# Patient Record
Sex: MALE | Race: BLACK OR AFRICAN AMERICAN | Employment: OTHER | URBAN - METROPOLITAN AREA
[De-identification: names, ages, dates, MRNs, and addresses within clinical notes are randomized per-mention and may not be internally consistent; named-entity substitution may affect disease eponyms.]

---

## 2017-01-20 ENCOUNTER — ANESTHESIA EVENT (OUTPATIENT)
Dept: ENDOSCOPY | Age: 57
End: 2017-01-20
Payer: MEDICARE

## 2017-01-23 ENCOUNTER — SURGERY (OUTPATIENT)
Age: 57
End: 2017-01-23

## 2017-01-23 ENCOUNTER — ANESTHESIA (OUTPATIENT)
Dept: ENDOSCOPY | Age: 57
End: 2017-01-23
Payer: MEDICARE

## 2017-01-23 ENCOUNTER — HOSPITAL ENCOUNTER (OUTPATIENT)
Age: 57
Setting detail: OUTPATIENT SURGERY
Discharge: HOME | End: 2017-01-23
Attending: INTERNAL MEDICINE | Admitting: INTERNAL MEDICINE
Payer: MEDICARE

## 2017-01-23 VITALS
HEART RATE: 60 BPM | TEMPERATURE: 96.8 F | BODY MASS INDEX: 28.88 KG/M2 | HEIGHT: 69 IN | RESPIRATION RATE: 16 BRPM | OXYGEN SATURATION: 99 % | SYSTOLIC BLOOD PRESSURE: 155 MMHG | WEIGHT: 195 LBS | DIASTOLIC BLOOD PRESSURE: 87 MMHG

## 2017-01-23 LAB
ATRIAL RATE: 71 BPM
BUN BLD-MCNC: 6 MG/DL (ref 7–18)
CALCULATED P AXIS, ECG09: 47 DEGREES
CALCULATED R AXIS, ECG10: 18 DEGREES
CALCULATED T AXIS, ECG11: -38 DEGREES
CHLORIDE BLD-SCNC: 96 MMOL/L (ref 100–108)
DIAGNOSIS, 93000: NORMAL
GLUCOSE BLD STRIP.AUTO-MCNC: 130 MG/DL (ref 70–110)
GLUCOSE BLD STRIP.AUTO-MCNC: 192 MG/DL (ref 74–106)
HCT VFR BLD CALC: 47 % (ref 36–49)
HGB BLD-MCNC: 16 G/DL (ref 12–16)
P-R INTERVAL, ECG05: 132 MS
POTASSIUM BLD-SCNC: 3.3 MMOL/L (ref 3.5–5.5)
Q-T INTERVAL, ECG07: 402 MS
QRS DURATION, ECG06: 84 MS
QTC CALCULATION (BEZET), ECG08: 436 MS
SODIUM BLD-SCNC: 139 MMOL/L (ref 136–145)
VENTRICULAR RATE, ECG03: 71 BPM

## 2017-01-23 PROCEDURE — 76040000019: Performed by: INTERNAL MEDICINE

## 2017-01-23 PROCEDURE — A9270 NON-COVERED ITEM OR SERVICE: HCPCS | Performed by: NURSE ANESTHETIST, CERTIFIED REGISTERED

## 2017-01-23 PROCEDURE — 82962 GLUCOSE BLOOD TEST: CPT

## 2017-01-23 PROCEDURE — 74011636637 HC RX REV CODE- 636/637: Performed by: NURSE ANESTHETIST, CERTIFIED REGISTERED

## 2017-01-23 PROCEDURE — 76060000031 HC ANESTHESIA FIRST 0.5 HR: Performed by: INTERNAL MEDICINE

## 2017-01-23 PROCEDURE — 74011250636 HC RX REV CODE- 250/636: Performed by: NURSE ANESTHETIST, CERTIFIED REGISTERED

## 2017-01-23 PROCEDURE — 82947 ASSAY GLUCOSE BLOOD QUANT: CPT

## 2017-01-23 PROCEDURE — 77030009426 HC FCPS BIOP ENDOSC BSC -B: Performed by: INTERNAL MEDICINE

## 2017-01-23 PROCEDURE — 93005 ELECTROCARDIOGRAM TRACING: CPT

## 2017-01-23 PROCEDURE — 77030031670 HC DEV INFL ENDOTEK BIG60 MRTM -B: Performed by: INTERNAL MEDICINE

## 2017-01-23 PROCEDURE — 74011250636 HC RX REV CODE- 250/636

## 2017-01-23 PROCEDURE — 88305 TISSUE EXAM BY PATHOLOGIST: CPT | Performed by: INTERNAL MEDICINE

## 2017-01-23 RX ORDER — PROPOFOL 10 MG/ML
INJECTION, EMULSION INTRAVENOUS AS NEEDED
Status: DISCONTINUED | OUTPATIENT
Start: 2017-01-23 | End: 2017-01-23 | Stop reason: HOSPADM

## 2017-01-23 RX ORDER — SODIUM CHLORIDE 0.9 % (FLUSH) 0.9 %
5-10 SYRINGE (ML) INJECTION EVERY 8 HOURS
Status: CANCELLED | OUTPATIENT
Start: 2017-01-23 | End: 2017-01-23

## 2017-01-23 RX ORDER — DEXTROMETHORPHAN/PSEUDOEPHED 2.5-7.5/.8
1.2 DROPS ORAL
Status: CANCELLED | OUTPATIENT
Start: 2017-01-23

## 2017-01-23 RX ORDER — SODIUM CHLORIDE, SODIUM LACTATE, POTASSIUM CHLORIDE, CALCIUM CHLORIDE 600; 310; 30; 20 MG/100ML; MG/100ML; MG/100ML; MG/100ML
75 INJECTION, SOLUTION INTRAVENOUS CONTINUOUS
Status: DISCONTINUED | OUTPATIENT
Start: 2017-01-24 | End: 2017-01-23 | Stop reason: HOSPADM

## 2017-01-23 RX ORDER — SODIUM CHLORIDE 0.9 % (FLUSH) 0.9 %
5-10 SYRINGE (ML) INJECTION AS NEEDED
Status: CANCELLED | OUTPATIENT
Start: 2017-01-23 | End: 2017-01-23

## 2017-01-23 RX ORDER — INSULIN LISPRO 100 [IU]/ML
INJECTION, SOLUTION INTRAVENOUS; SUBCUTANEOUS
Status: COMPLETED | OUTPATIENT
Start: 2017-01-23 | End: 2017-01-23

## 2017-01-23 RX ADMIN — INSULIN LISPRO 2 UNITS: 100 INJECTION, SOLUTION INTRAVENOUS; SUBCUTANEOUS at 08:56

## 2017-01-23 RX ADMIN — SODIUM CHLORIDE, SODIUM LACTATE, POTASSIUM CHLORIDE, AND CALCIUM CHLORIDE 75 ML/HR: 600; 310; 30; 20 INJECTION, SOLUTION INTRAVENOUS at 08:49

## 2017-01-23 RX ADMIN — PROPOFOL 50 MG: 10 INJECTION, EMULSION INTRAVENOUS at 09:44

## 2017-01-23 RX ADMIN — PROPOFOL 50 MG: 10 INJECTION, EMULSION INTRAVENOUS at 09:40

## 2017-01-23 NOTE — H&P
Date of Surgery Update:  Justina Vides was seen and examined. History and physical has been reviewed. The patient has been examined.  There have been no significant clinical changes since the completion of the originally dated History and Physical.    Signed By: Eulalia Cam MD     January 23, 2017 9:32 AM

## 2017-01-23 NOTE — DISCHARGE INSTRUCTIONS
Patient Discharge Instructions    Drake Fish / [id number] : 1960    Admitted 2017 Discharged: 2017         Procedure Impression:  1. Single 3mm ascending colon polyp removed with biopsy forceps. 2. Moderate internal hemorrhoids. 3. Otherwise normal colonoscopy including terminal ileum. Random biopsies taken for microscopic colitis evaluation. Recommendation:  1. Resume regular diet, recommend high fiber  2. Will contact with polyp results in 2 weeks. These results will determine timing to next colon cancer screening. 3. Please contact our office if you have not received the results by three weeks. Recommended Diet: Regular Diet    Recommended Activity:    1. Do not drink alcohol, drive or operate machinery for 12 hours   2. Call if any fever, abdominal pain or bleeding noted. Signed By: Sydnie Sauceda MD     2017            Colonoscopy: What to Expect at [address] [place] [address]  After you have a colonoscopy, you will stay at the clinic for 1 to 2 hours until the medicines wear off. Then you can go home. But you will need to arrange for a ride. Your doctor will tell you when you can eat and do your other usual activities. Your doctor will talk to you about when you will need your next colonoscopy. Your doctor can help you decide how often you need to be checked. This will depend on the results of your test and your risk for colorectal cancer. After the test, you may be bloated or have gas pains. You may need to pass gas. If a biopsy was done or a polyp was removed, you may have streaks of blood in your stool (feces) for a few days. This care sheet gives you a general idea about how long it will take for you to recover. But each person recovers at a different pace. Follow the steps below to get better as quickly as possible. How can you care for yourself at home? Activity  · Rest when you feel tired.   · You can do your normal activities when it feels okay to do so. Diet  · Follow your doctor's directions for eating. · Unless your doctor has told you not to, drink plenty of fluids. This helps to replace the fluids that were lost during the colon prep. · Do not drink alcohol. Medicines  · Your doctor will tell you if and when you can restart your medicines. He or she will also give you instructions about taking any new medicines. · If you take blood thinners, such as warfarin (Coumadin), clopidogrel (Plavix), or aspirin, be sure to talk to your doctor. He or she will tell you if and when to start taking those medicines again. Make sure that you understand exactly what your doctor wants you to do. · If polyps were removed or a biopsy was done during the test, your doctor may tell you not to take aspirin or other anti-inflammatory medicines for a few days. These include ibuprofen (Advil, Motrin) and naproxen (Aleve). Other instructions  · For your safety, do not drive or operate machinery until the medicine wears off and you can think clearly. Your doctor may tell you not to drive or operate machinery until the day after your test.  · Do not sign legal documents or make major decisions until the medicine wears off and you can think clearly. The anesthesia can make it hard for you to fully understand what you are agreeing to. Follow-up care is a key part of your treatment and safety. Be sure to make and go to all appointments, and call your doctor if you are having problems. It's also a good idea to know your test results and keep a list of the medicines you take. When should you call for help? Call 911 anytime you think you may need emergency care. For example, call if:  · You passed out (lost consciousness). · You pass maroon or bloody stools. · You have severe belly pain. Call your doctor now or seek immediate medical care if:  · Your stools are black and tarlike.   · Your stools have streaks of blood, but you did not have a biopsy or any polyps removed. · You have belly pain, or your belly is swollen and firm. · You vomit. · You have a fever. · You are very dizzy. Watch closely for changes in your health, and be sure to contact your doctor if you have any problems. Where can you learn more? Go to http://laurita-venu.info/. Enter E264 in the search box to learn more about \"Colonoscopy: What to Expect at Home. \"  Current as of: August 9, 2016  Content Version: 11.1  © 6352-8455 [employer]. Care instructions adapted under license by [employer] (which disclaims liability or warranty for this information). If you have questions about a medical condition or this instruction, always ask your healthcare professional. [address] any warranty or liability for your use of this information. DISCHARGE SUMMARY from Nurse    The following personal items are in your possession at time of discharge:    Dental Appliances: None  Visual Aid: Glasses                            PATIENT INSTRUCTIONS:    After general anesthesia or intravenous sedation, for 24 hours or while taking prescription Narcotics:  · Limit your activities  · Do not drive and operate hazardous machinery  · Do not make important personal or business decisions  · Do  not drink alcoholic beverages  · If you have not urinated within 8 hours after discharge, please contact your surgeon on call.     Report the following to your surgeon:  · Excessive pain, swelling, redness or odor of or around the surgical area  · Temperature over 100.5  · Nausea and vomiting lasting longer than 4 hours or if unable to take medications  · Any signs of decreased circulation or nerve impairment to extremity: change in color, persistent  numbness, tingling, coldness or increase pain  · Any questions        What to do at Home:  These are general instructions for a healthy lifestyle:    No smoking/ No tobacco products/ Avoid exposure to second hand smoke    Surgeon General's Warning:  Quitting smoking now greatly reduces serious risk to your health. Obesity, smoking, and sedentary lifestyle greatly increases your risk for illness    A healthy diet, regular physical exercise & weight monitoring are important for maintaining a healthy lifestyle    You may be retaining fluid if you have a history of heart failure or if you experience any of the following symptoms:  Weight gain of 3 pounds or more overnight or 5 pounds in a week, increased swelling in our hands or feet or shortness of breath while lying flat in bed. Please call your doctor as soon as you notice any of these symptoms; do not wait until your next office visit. Recognize signs and symptoms of STROKE:    F-face looks uneven    A-arms unable to move or move unevenly    S-speech slurred or non-existent    T-time-call 911 as soon as signs and symptoms begin-DO NOT go       Back to bed or wait to see if you get better-TIME IS BRAIN. Warning Signs of HEART ATTACK     Call 911 if you have these symptoms:   Chest discomfort. Most heart attacks involve discomfort in the center of the chest that lasts more than a few minutes, or that goes away and comes back. It can feel like uncomfortable pressure, squeezing, fullness, or pain.  Discomfort in other areas of the upper body. Symptoms can include pain or discomfort in one or both arms, the back, neck, jaw, or stomach.  Shortness of breath with or without chest discomfort.  Other signs may include breaking out in a cold sweat, nausea, or lightheadedness. Don't wait more than five minutes to call 911 - MINUTES MATTER! Fast action can save your life. Calling 911 is almost always the fastest way to get lifesaving treatment. Emergency Medical Services staff can begin treatment when they arrive -- up to an hour sooner than if someone gets to the hospital by car. The discharge information has been reviewed with the patient.   The patient verbalized understanding. Discharge medications reviewed with the patient and appropriate educational materials and side effects teaching were provided. Patient armband removed and given to patient to take home.   Patient was informed of the privacy risks if armband lost or stolen

## 2017-01-23 NOTE — IP AVS SNAPSHOT
Meghann Locke 
 
 
 [address] 
[phone] Patient: Nafsia Parra MRN: [id number] :1960 You are allergic to the following Allergen Reactions Lisinopril Swelling Recent Documentation Height Weight BMI Smoking Status 1.753 m 88.5 kg 28.8 kg/m2 Current Every Day Smoker Emergency Contacts Name Discharge Info Relation Home Work [employer] CAREGIVER [3] Spouse [3] [phone] About your hospitalization You were admitted on:  2017 You last received care in the:  [facility] PHASE 2 RECOVERY You were discharged on:  2017 Unit phone number:  [phone] Why you were hospitalized Your primary diagnosis was:  Not on File Providers Seen During Your Hospitalizations Provider Role Specialty Primary office phone Yaquelin Eid MD Attending Provider Gastroenterology [phone] Your Primary Care Physician (PCP) Primary Care Physician Office Phone Office Fax [facility] [phone] Follow-up Information Follow up With Details Comments Contact Info Shadi Bagley MD   [address] [facility] [address] [place] [phone] 
[phone] Your Appointments 2017 10:00 AM EST Office Visit with Shadi Bagley MD  
[facility] [address] [facility] [address] [facility] [phone] Current Discharge Medication List  
  
CONTINUE these medications which have NOT CHANGED Dose & Instructions Dispensing Information Comments Morning Noon Evening Bedtime  
 albuterol 90 mcg/actuation inhaler Commonly known as:  PROVENTIL HFA, VENTOLIN HFA, PROAIR HFA Your next dose is: Today, Tomorrow Other:  _________ Dose:  2 Puff Take 2 Puffs by inhalation every six (6) hours as needed for Wheezing. Quantity:  1 Inhaler Refills:  3  
     
   
   
   
  
 amLODIPine 10 mg tablet Commonly known as:  Arlyss Redford Your next dose is: Today, Tomorrow Other:  _________ Dose:  10 mg Take 10 mg by mouth daily. Refills:  0  
     
   
   
   
  
 aspirin 81 mg chewable tablet Your next dose is: Today, Tomorrow Other:  _________ Dose:  81 mg Take 1 Tab by mouth daily. Quantity:  90 Tab Refills:  1  
     
   
   
   
  
 carboxymethylcellulose sodium 0.5 % Drop ophthalmic solution Commonly known as:  REFRESH TEARS Your next dose is: Today, Tomorrow Other:  _________ Dose:  1 Drop Administer 1 Drop to both eyes two (2) times a day. Quantity:  30 mL Refills:  3  
     
   
   
   
  
 cetirizine 10 mg tablet Commonly known as:  ZYRTEC Your next dose is: Today, Tomorrow Other:  _________ Dose:  10 mg Take 1 Tab by mouth daily. Quantity:  30 Tab Refills:  3 cholecalciferol 1,000 unit tablet Commonly known as:  VITAMIN D3 Your next dose is: Today, Tomorrow Other:  _________ Dose:  1000 Units Take 1,000 Units by mouth daily. Refills:  0  
     
   
   
   
  
 clobetasol 0.05 % ointment Commonly known as:  Suzanne Plume Your next dose is: Today, Tomorrow Other:  _________ Apply  to affected area two (2) times a day. Quantity:  15 g Refills:  2  
     
   
   
   
  
 fluticasone 50 mcg/actuation nasal spray Commonly known as:  Bronson Riser Your next dose is: Today, Tomorrow Other:  _________ Dose:  2 Spray 2 Sprays by Both Nostrils route daily. Quantity:  3 Bottle Refills:  4  
     
   
   
   
  
 fluticasone-salmeterol 250-50 mcg/dose diskus inhaler Commonly known as:  ADVAIR Your next dose is: Today, Tomorrow Other:  _________ Dose:  1 Puff Take 1 Puff by inhalation every twelve (12) hours. Quantity:  2 Inhaler Refills:  3  
     
   
   
   
  
 gabapentin 300 mg capsule Commonly known as:  NEURONTIN Your next dose is: Today, Tomorrow Other:  _________ Dose:  300 mg Take 1 Cap by mouth four (4) times daily. Quantity:  120 Cap Refills:  1  
     
   
   
   
  
 glipiZIDE 10 mg tablet Commonly known as:  Terrie Dc Your next dose is: Today, Tomorrow Other:  _________ Dose:  10 mg Take 10 mg by mouth three (3) times daily. Refills:  0  
     
   
   
   
  
 hydroCHLOROthiazide 25 mg tablet Commonly known as:  HYDRODIURIL Your next dose is: Today, Tomorrow Other:  _________ Dose:  25 mg Take 25 mg by mouth daily. Refills:  0  
     
   
   
   
  
 lactulose 10 gram packet Commonly known as:  Raymond Muller Your next dose is: Today, Tomorrow Other:  _________ Dose:  10 g Take 10 g by mouth three (3) times daily. Refills:  0  
     
   
   
   
  
 losartan 50 mg tablet Commonly known as:  COZAAR Your next dose is: Today, Tomorrow Other:  _________ Take  by mouth daily. Refills:  0  
     
   
   
   
  
 metFORMIN 1,000 mg tablet Commonly known as:  GLUCOPHAGE Your next dose is: Today, Tomorrow Other:  _________ Dose:  1000 mg Take 1,000 mg by mouth two (2) times daily (with meals). Refills:  0  
     
   
   
   
  
 omega-3 fatty acids-vitamin e 1,000 mg Cap Commonly known as:  FISH OIL Your next dose is: Today, Tomorrow Other:  _________ Dose:  1 Cap Take 1 Cap by mouth two (2) times a day. Quantity:  60 Cap Refills:  3  
     
   
   
   
  
 polyethylene glycol 17 gram/dose powder Commonly known as:  Beola Frank Your next dose is: Today, Tomorrow Other:  _________ Dose:  17 g Take 17 g by mouth daily. Quantity:  850 g Refills:  3  
     
   
   
   
  
 potassium chloride 10 mEq tablet Commonly known as:  K-DUR, KLOR-CON Your next dose is: Today, Tomorrow Other:  _________ Dose:  10 mEq Take 1 Tab by mouth daily. Quantity:  30 Tab Refills:  0  
     
   
   
   
  
 pravastatin 40 mg tablet Commonly known as:  PRAVACHOL Your next dose is: Today, Tomorrow Other:  _________ Dose:  60 mg Take 1.5 Tabs by mouth nightly. Take one-half tablet by mouth at bedtime Quantity:  90 Tab Refills:  1  
     
   
   
   
  
 propranolol  mg SR capsule Commonly known as:  INDERAL LA Your next dose is: Today, Tomorrow Other:  _________ Dose:  120 mg Take 120 mg by mouth daily. Refills:  0 PROTONIX 40 mg granules for oral suspension Generic drug:  pantoprazole Your next dose is: Today, Tomorrow Other:  _________ Dose:  40 mg  
40 mg daily. Refills:  0  
     
   
   
   
  
 traMADol 50 mg tablet Commonly known as:  ULTRAM  
   
Your next dose is: Today, Tomorrow Other:  _________ Dose:  50 mg Take 1 Tab by mouth every eight (8) hours as needed for Pain. Max Daily Amount: 150 mg.  
 Quantity:  40 Tab Refills:  0  
     
   
   
   
  
 valsartan 80 mg tablet Commonly known as:  DIOVAN Your next dose is: , Tomorrow Other:  _________ Dose:  80 mg Take 1 Tab by mouth daily. Quantity:  30 Tab Refills:  3 Discharge Instructions Patient Discharge Instructions Nenita Frias / [id number] : 1960 Admitted 2017 Discharged: 2017 Procedure Impression: 1. Single 3mm ascending colon polyp removed with biopsy forceps. 2. Moderate internal hemorrhoids. 3. Otherwise normal colonoscopy including terminal ileum. Random biopsies taken for microscopic colitis evaluation. Recommendation: 1. Resume regular diet, recommend high fiber 2. Will contact with polyp results in 2 weeks. These results will determine timing to next colon cancer screening. 3. Please contact our office if you have not received the results by three weeks. Recommended Diet: Regular Diet Recommended Activity: 1. Do not drink alcohol, drive or operate machinery for 12 hours 2. Call if any fever, abdominal pain or bleeding noted. Signed By: Gary Gutierrez MD   
 January 23, 2017 Colonoscopy: What to Expect at [facility] Your Recovery After you have a colonoscopy, you will stay at the clinic for 1 to 2 hours until the medicines wear off. Then you can go home. But you will need to arrange for a ride. Your doctor will tell you when you can eat and do your other usual activities. Your doctor will talk to you about when you will need your next colonoscopy. Your doctor can help you decide how often you need to be checked. This will depend on the results of your test and your risk for colorectal cancer. After the test, you may be bloated or have gas pains. You may need to pass gas. If a biopsy was done or a polyp was removed, you may have streaks of blood in your stool (feces) for a few days. This care sheet gives you a general idea about how long it will take for you to recover. But each person recovers at a different pace. Follow the steps below to get better as quickly as possible. How can you care for yourself at home? Activity · Rest when you feel tired. · You can do your normal activities when it feels okay to do so. Diet · Follow your doctor's directions for eating. · Unless your doctor has told you not to, drink plenty of fluids. This helps to replace the fluids that were lost during the colon prep. · Do not drink alcohol. Medicines · Your doctor will tell you if and when you can restart your medicines. He or she will also give you instructions about taking any new medicines. · If you take blood thinners, such as warfarin (Coumadin), clopidogrel (Plavix), or aspirin, be sure to talk to your doctor. He or she will tell you if and when to start taking those medicines again. Make sure that you understand exactly what your doctor wants you to do. · If polyps were removed or a biopsy was done during the test, your doctor may tell you not to take aspirin or other anti-inflammatory medicines for a few days. These include ibuprofen (Advil, Motrin) and naproxen (Aleve). Other instructions · For your safety, do not drive or operate machinery until the medicine wears off and you can think clearly. Your doctor may tell you not to drive or operate machinery until the day after your test. 
· Do not sign legal documents or make major decisions until the medicine wears off and you can think clearly. The anesthesia can make it hard for you to fully understand what you are agreeing to. Follow-up care is a key part of your treatment and safety. Be sure to make and go to all appointments, and call your doctor if you are having problems. It's also a good idea to know your test results and keep a list of the medicines you take. When should you call for help? Call 911 anytime you think you may need emergency care. For example, call if: 
· You passed out (lost consciousness). · You pass maroon or bloody stools. · You have severe belly pain. Call your doctor now or seek immediate medical care if: 
· Your stools are black and tarlike. · Your stools have streaks of blood, but you did not have a biopsy or any polyps removed. · You have belly pain, or your belly is swollen and firm. · You vomit. · You have a fever. · You are very dizzy. Watch closely for changes in your health, and be sure to contact your doctor if you have any problems. Where can you learn more? Go to http://laurita-venu.info/. Enter E264 in the search box to learn more about \"Colonoscopy: What to Expect at Home. \" Current as of: August 9, 2016 Content Version: 11.1 © 2765-2014 [employer]. Care instructions adapted under license by [employer] (which disclaims liability or warranty for this information). If you have questions about a medical condition or this instruction, always ask your healthcare professional. [address] any warranty or liability for your use of this information. DISCHARGE SUMMARY from Nurse The following personal items are in your possession at time of discharge: 
 
Dental Appliances: None Visual Aid: Glasses PATIENT INSTRUCTIONS: 
 
After general anesthesia or intravenous sedation, for 24 hours or while taking prescription Narcotics: · Limit your activities · Do not drive and operate hazardous machinery · Do not make important personal or business decisions · Do  not drink alcoholic beverages · If you have not urinated within 8 hours after discharge, please contact your surgeon on call. Report the following to your surgeon: 
· Excessive pain, swelling, redness or odor of or around the surgical area · Temperature over 100.5 · Nausea and vomiting lasting longer than 4 hours or if unable to take medications · Any signs of decreased circulation or nerve impairment to extremity: change in color, persistent  numbness, tingling, coldness or increase pain · Any questions What to do at Home: These are general instructions for a healthy lifestyle: No smoking/ No tobacco products/ Avoid exposure to second hand smoke Surgeon General's Warning:  Quitting smoking now greatly reduces serious risk to your health. Obesity, smoking, and sedentary lifestyle greatly increases your risk for illness A healthy diet, regular physical exercise & weight monitoring are important for maintaining a healthy lifestyle You may be retaining fluid if you have a history of heart failure or if you experience any of the following symptoms:  Weight gain of 3 pounds or more overnight or 5 pounds in a week, increased swelling in our hands or feet or shortness of breath while lying flat in bed. Please call your doctor as soon as you notice any of these symptoms; do not wait until your next office visit. Recognize signs and symptoms of STROKE: 
 
F-face looks uneven A-arms unable to move or move unevenly S-speech slurred or non-existent T-time-call 911 as soon as signs and symptoms begin-DO NOT go Back to bed or wait to see if you get better-TIME IS BRAIN. Warning Signs of HEART ATTACK Call 911 if you have these symptoms: 
? Chest discomfort. Most heart attacks involve discomfort in the center of the chest that lasts more than a few minutes, or that goes away and comes back. It can feel like uncomfortable pressure, squeezing, fullness, or pain. ? Discomfort in other areas of the upper body. Symptoms can include pain or discomfort in one or both arms, the back, neck, jaw, or stomach. ? Shortness of breath with or without chest discomfort. ? Other signs may include breaking out in a cold sweat, nausea, or lightheadedness. Don't wait more than five minutes to call [address]! Fast action can save your life. Calling 911 is almost always the fastest way to get lifesaving treatment. Emergency Medical Services staff can begin treatment when they arrive  up to an hour sooner than if someone gets to the hospital by car. The discharge information has been reviewed with the patient. The patient verbalized understanding. Discharge medications reviewed with the patient and appropriate educational materials and side effects teaching were provided. Patient armband removed and given to patient to take home. Patient was informed of the privacy risks if armband lost or stolen Discharge Orders None Introducing [facility]! Dear Jing Forth: Thank you for requesting a [employer] account. Our records indicate that you already have an active [employer] account. You can access your account anytime at [email or web address] Did you know that you can access your hospital and ER discharge instructions at any time in [employer]? You can also review all of your test results from your hospital stay or ER visit. Additional Information If you have questions, please visit the Frequently Asked Questions section of the [employer] website at [email or web address] Remember, [employer] is NOT to be used for urgent needs. For medical emergencies, dial 911. Now available from your iPhone and Android! General Information Please provide this summary of care documentation to your next provider. Patient Signature:  ____________________________________________________________ Date:  ____________________________________________________________  
  
Jannette Blanca Provider Signature:  ____________________________________________________________ Date:  ____________________________________________________________

## 2017-01-23 NOTE — PROCEDURES
Colonoscopy Report    Patient: Claire Fountain MRN: [id number]  SSN: [id number]    YOB: 1960  Age: 49 y.o. Sex: male      Date of Procedure: 1/23/2017    IMPRESSION:  1. Single 3mm ascending colon polyp removed with biopsy forceps. 2. Moderate internal hemorrhoids. 3. Otherwise normal colonoscopy including terminal ileum. Random biopsies taken for microscopic colitis evaluation. RECOMMENDATIONS:  1. Resume regular diet, Recommend high fiber. 2. Will contact with polyp results in 2 weeks. These results will determine timing to next screening. Patient will be instructed to contact our office if they have not received the results by three weeks. Indication:  Change in bowel habits  Procedure Performed: Colonoscopy biopsy, polypectomy (cold biopsy)  Endoscopist: Heidi Howell MD  Assistant: Endoscopy Technician-1: Amy Heath  Pre Op Nurse: Michael Orourke RN  Endoscopy RN-1: Haley De. Kiah Gardiner RN  ASA: ASA 3 - Patient with moderate systemic disease with functional limitations  Mallampati Score: III (soft palate, base of uvula visible)  Anesthesia: MAC anesthesia  Endoscope:     [x]  CF H 190AL   []  PCF H190AL   []  GIF H 190    Extent of Examination:terminal ileum  Quality of Preparation:     []  Excellent   [x]  Very Good   [] Fair but adequate   [] Fair, inadequate   []  Poor      Technique: The procedure was discussed with the patient including risks, benefits, alternatives including risks of IV sedation, bleeding, perforation and missed polyp. A safety timeout was performed. The patient was given incremental doses of intravenous sedation to achieve moderate sedation. The patients vital signs were monitored at all times including heart rate, rhythm, blood pressure and oxygen saturation. The patient was placed in left lateral position. When adequate sedation was achieved a perianal inspection and a digital rectal exam were performed.  Video colonoscope was introduced into the rectum and advanced under direct vision up to the terminal ileum. The cecum was identified by IC valve, appendiceal orifice and crows foot. With adequate insufflation and maneuvering of the withdrawing scope, the colonic mucosa was visualized carefully. Retroflexion was performed in the rectum and the distal rectum visualized. The patient tolerated the procedure very well and was transferred to recovery area. Findings:  1. Normal rectal exam.   2. Normal terminal ileum with no ulceration, mass, stricture. 3. There was a single 3mm sessile polyp in the ascending colon. The polyp was removed with biopsy forceps. 4. There were moderate sized hemorrhoids in the distal rectum. 5. The colonic mucosa was otherwise normal with no other polyps and no ulceration, mass, stricture. Four pinch biopsies taken in the descending colon to evaluate for microscopic colitis.        EBL:None  Specimen:   ID Type Source Tests Collected by Time Destination   1 : bx polyp Preservative Colon, Ascending  David Mendoza MD 1/23/2017 7024 Pathology   2 : bxs r/o microscopic colitis Preservative Colon, Descending  David Mendoza MD 1/23/2017 2944 Pathology       David Mendoza MD  January 23, 2017  9:58 AM

## 2017-01-23 NOTE — ANESTHESIA PREPROCEDURE EVALUATION
Anesthetic History   No history of anesthetic complications            Review of Systems / Medical History  Patient summary reviewed and pertinent labs reviewed    Pulmonary    COPD    Sleep apnea: No treatment           Neuro/Psych   Within defined limits           Cardiovascular    Hypertension              Exercise tolerance: >4 METS     GI/Hepatic/Renal  Within defined limits              Endo/Other    Diabetes: poorly controlled, type 2         Other Findings   Comments: Current Smoker? YES       Elective Surgery? Yes       Abstained from smoking 24 hours prior to anesthesia? NO    Risk Factors for Postoperative nausea/vomiting:       History of postoperative nausea/vomiting? NO       Female? NO       Motion sickness? NO       Intended opioid administration for postoperative analgesia?   NO              Anesthetic Plan    ASA: 3  Anesthesia type: MAC          Induction: Intravenous  Anesthetic plan and risks discussed with: Patient

## 2017-01-24 NOTE — ANESTHESIA POSTPROCEDURE EVALUATION
Post-Anesthesia Evaluation and Assessment    Patient: Erin Mae MRN: [id number]  SSN: [id number]    YOB: 1960  Age: 49 y.o. Sex: male       Cardiovascular Function/Vital Signs  Visit Vitals    /87 (BP 1 Location: Left arm, BP Patient Position: At rest)    Pulse 60    Temp 36 °C (96.8 °F)    Resp 16    Ht 5' 9\" (1.753 m)    Wt 88.5 kg (195 lb)    SpO2 99%    BMI 28.8 kg/m2       Patient is status post MAC anesthesia for Procedure(s):  COLONOSCOPY. Nausea/Vomiting: None    Postoperative hydration reviewed and adequate. Pain:  Pain Scale 1: Numeric (0 - 10) (01/23/17 1043)  Pain Intensity 1: 0 (01/23/17 1043)   Managed    Neurological Status: At baseline    Mental Status and Level of Consciousness: Alert and oriented     Pulmonary Status:   O2 Device: Room air (01/23/17 0958)   Adequate oxygenation and airway patent    Complications related to anesthesia: None    Post-anesthesia assessment completed.  No concerns    Signed By: Fili Mesa MD     January 24, 2017

## 2017-02-03 ENCOUNTER — OFFICE VISIT (OUTPATIENT)
Dept: FAMILY MEDICINE CLINIC | Age: 57
End: 2017-02-03

## 2017-02-03 VITALS
DIASTOLIC BLOOD PRESSURE: 87 MMHG | OXYGEN SATURATION: 95 % | SYSTOLIC BLOOD PRESSURE: 149 MMHG | WEIGHT: 197 LBS | TEMPERATURE: 96.8 F | RESPIRATION RATE: 17 BRPM | HEART RATE: 94 BPM | HEIGHT: 69 IN | BODY MASS INDEX: 29.18 KG/M2

## 2017-02-03 DIAGNOSIS — E87.6 HYPOKALEMIA: ICD-10-CM

## 2017-02-03 DIAGNOSIS — J30.2 OTHER SEASONAL ALLERGIC RHINITIS: ICD-10-CM

## 2017-02-03 DIAGNOSIS — E78.00 HYPERCHOLESTEREMIA: ICD-10-CM

## 2017-02-03 DIAGNOSIS — M54.41 MIDLINE LOW BACK PAIN WITH RIGHT-SIDED SCIATICA, UNSPECIFIED CHRONICITY: ICD-10-CM

## 2017-02-03 DIAGNOSIS — I10 ESSENTIAL HYPERTENSION: Primary | ICD-10-CM

## 2017-02-03 DIAGNOSIS — M79.672 PAIN IN BOTH FEET: ICD-10-CM

## 2017-02-03 DIAGNOSIS — M79.671 PAIN IN BOTH FEET: ICD-10-CM

## 2017-02-03 RX ORDER — TRAMADOL HYDROCHLORIDE 50 MG/1
50 TABLET ORAL
Qty: 40 TAB | Refills: 0 | Status: SHIPPED | OUTPATIENT
Start: 2017-02-03 | End: 2017-03-16 | Stop reason: SDUPTHER

## 2017-02-03 RX ORDER — POTASSIUM CHLORIDE 750 MG/1
10 TABLET, EXTENDED RELEASE ORAL DAILY
Qty: 30 TAB | Refills: 0 | Status: SHIPPED | OUTPATIENT
Start: 2017-02-03 | End: 2017-04-19

## 2017-02-03 RX ORDER — CETIRIZINE HCL 10 MG
10 TABLET ORAL DAILY
Qty: 30 TAB | Refills: 3 | Status: SHIPPED | OUTPATIENT
Start: 2017-02-03 | End: 2018-02-08 | Stop reason: SDUPTHER

## 2017-02-03 NOTE — MR AVS SNAPSHOT
Visit Information Date & Time Provider Department Dept. Phone Encounter #  
 2/3/2017 10:00 AM [id number], [address] [facility] [phone] Follow-up Instructions Return in about 6 weeks (around 3/17/2017). Upcoming Health Maintenance Date Due  
 EYE EXAM RETINAL OR DILATED Q1 3/8/1972 FOOT EXAM Q1 9/17/2016 MICROALBUMIN Q1 2/17/2017 HEMOGLOBIN A1C Q6M 5/21/2017 LIPID PANEL Q1 7/27/2017 COLONOSCOPY 1/23/2022 DTaP/Tdap/Td series (2 - Td) 9/17/2025 Allergies as of 2/3/2017  Review Complete On: 2/3/2017 By: [id number] MD  
  
 Severity Noted Reaction Type Reactions Lisinopril  03/01/2016    Swelling Current Immunizations  Never Reviewed Name Date Influenza Vaccine 9/17/2015  9:38 AM  
 Influenza Vaccine (Quad) PF 10/28/2016 Pneumococcal Polysaccharide (PPSV-23) 7/27/2016 Tdap 9/17/2015  9:39 AM  
  
 Not reviewed this visit You Were Diagnosed With   
  
 Codes Comments Essential hypertension    -  Primary ICD-10-CM: I10 
ICD-9-CM: 401.9 Midline low back pain with right-sided sciatica, unspecified chronicity     ICD-10-CM: M54.41 
ICD-9-CM: 724.3 Other seasonal allergic rhinitis     ICD-10-CM: J30.2 ICD-9-CM: 477.8 Hypokalemia     ICD-10-CM: E87.6 ICD-9-CM: 276.8 Hypercholesteremia     ICD-10-CM: E78.00 ICD-9-CM: 272.0 Pain in both feet     ICD-10-CM: M79.671, E02.699 ICD-9-CM: 729.5 Vitals BP Pulse Temp Resp Height(growth percentile) Weight(growth percentile) 149/87 (BP 1 Location: Right arm, BP Patient Position: Sitting) 94 96.8 °F (36 °C) (Oral) 17 5' 9\" (1.753 m) 197 lb (89.4 kg) SpO2 BMI Smoking Status 95% 29.09 kg/m2 Current Every Day Smoker Vitals History BMI and BSA Data Body Mass Index Body Surface Area  
 29.09 kg/m 2 2.09 m 2 Preferred Pharmacy Pharmacy Name Phone [address] [place] [address], [facility] [phone] Your Updated Medication List  
  
   
This list is accurate as of: 2/3/17 10:44 AM.  Always use your most recent med list.  
  
  
  
  
 albuterol 90 mcg/actuation inhaler Commonly known as:  PROVENTIL HFA, VENTOLIN HFA, PROAIR HFA Take 2 Puffs by inhalation every six (6) hours as needed for Wheezing. amLODIPine 10 mg tablet Commonly known as:  Jessy Juanjose Take 10 mg by mouth daily. aspirin 81 mg chewable tablet Take 1 Tab by mouth daily. carboxymethylcellulose sodium 0.5 % Drop ophthalmic solution Commonly known as:  REFRESH TEARS Administer 1 Drop to both eyes two (2) times a day. cetirizine 10 mg tablet Commonly known as:  ZYRTEC Take 1 Tab by mouth daily. cholecalciferol 1,000 unit tablet Commonly known as:  VITAMIN D3 Take 1,000 Units by mouth daily. clobetasol 0.05 % ointment Commonly known as:  Harrmoe Reagin Apply  to affected area two (2) times a day. fluticasone 50 mcg/actuation nasal spray Commonly known as:  Caffie Euler 2 Sprays by Both Nostrils route daily. fluticasone-salmeterol 250-50 mcg/dose diskus inhaler Commonly known as:  ADVAIR Take 1 Puff by inhalation every twelve (12) hours. gabapentin 300 mg capsule Commonly known as:  NEURONTIN Take 1 Cap by mouth four (4) times daily. glipiZIDE 10 mg tablet Commonly known as:  Cook Islander Edward Take 10 mg by mouth three (3) times daily. hydroCHLOROthiazide 25 mg tablet Commonly known as:  HYDRODIURIL Take 25 mg by mouth daily. lactulose 10 gram packet Commonly known as:  Veleria Jury Take 10 g by mouth three (3) times daily. losartan 50 mg tablet Commonly known as:  COZAAR Take  by mouth daily. metFORMIN 1,000 mg tablet Commonly known as:  GLUCOPHAGE Take 1,000 mg by mouth two (2) times daily (with meals). omega-3 fatty acids-vitamin e 1,000 mg Cap Commonly known as:  FISH OIL Take 1 Cap by mouth two (2) times a day. polyethylene glycol 17 gram/dose powder Commonly known as:  Lelan Situ Take 17 g by mouth daily. potassium chloride 10 mEq tablet Commonly known as:  K-DUR, KLOR-CON Take 1 Tab by mouth daily. pravastatin 40 mg tablet Commonly known as:  PRAVACHOL Take 1.5 Tabs by mouth nightly. Take one-half tablet by mouth at bedtime  
  
 propranolol  mg SR capsule Commonly known as:  INDERAL LA Take 120 mg by mouth daily. PROTONIX 40 mg granules for oral suspension Generic drug:  pantoprazole 40 mg daily. traMADol 50 mg tablet Commonly known as:  ULTRAM  
Take 1 Tab by mouth every eight (8) hours as needed for Pain. Max Daily Amount: 150 mg.  
  
 valsartan 80 mg tablet Commonly known as:  DIOVAN Take 1 Tab by mouth daily. Prescriptions Printed Refills  
 traMADol (ULTRAM) 50 mg tablet 0 Sig: Take 1 Tab by mouth every eight (8) hours as needed for Pain. Max Daily Amount: 150 mg.  
 Class: Print Route: Oral  
 cetirizine (ZYRTEC) 10 mg tablet 3 Sig: Take 1 Tab by mouth daily. Class: Print Route: Oral  
 potassium chloride (K-DUR, KLOR-CON) 10 mEq tablet 0 Sig: Take 1 Tab by mouth daily. Class: Print Route: Oral  
  
We Performed the Following REFERRAL TO PODIATRY [REF90 Custom] Comments:  
 Please evaluate patient for foot pain Follow-up Instructions Return in about 6 weeks (around 3/17/2017). Referral Information Referral ID Referred By Referred To  
  
 [id number] Rebeca CASTANO Not Available Visits Status Start Date End Date 1 New Request 2/3/17 2/3/18 If your referral has a status of pending review or denied, additional information will be sent to support the outcome of this decision. Introducing [facility]! Dear Gerri Case: Thank you for requesting a [employer] account. Our records indicate that you already have an active [employer] account. You can access your account anytime at [email or web address] Did you know that you can access your hospital and ER discharge instructions at any time in [employer]? You can also review all of your test results from your hospital stay or ER visit. Additional Information If you have questions, please visit the Frequently Asked Questions section of the [employer] website at [email or web address] Remember, [employer] is NOT to be used for urgent needs. For medical emergencies, dial 911. Now available from your iPhone and Android! Please provide this summary of care documentation to your next provider. Your primary care clinician is listed as [id number]. If you have any questions after today's visit, please call [phone].

## 2017-02-03 NOTE — PROGRESS NOTES
Patient presents to clinic for routine follow up. Advance Directive:    1. Do you have an advance directive in place? Patient Reply:     2. If not, would you like material regarding how to put one in place? Patient Reply:      Coordination of Care:    1. Have you been to the ER, urgent care clinic since your last visit? Hospitalized since your last visit? No    2. Have you seen or consulted any other health care providers outside of the [address] [place] [address] since your last visit? Include any pap smears or colon screening. Colonoscopy     Depression Screening completed. Learning Assessment completed. Abuse Screening completed. Health Maintenance reviewed and discussed per provider.

## 2017-02-03 NOTE — PROGRESS NOTES
Nenita Frias is a 49 y.o.  male and presents with     Chief Complaint   Patient presents with    Diabetes    Hypertension    Cholesterol Problem    Foot Pain    Back Pain       Pt had colonopscy that showed tubular adenoma. Pt also was given a med for abdominal pain by GI but neds to be filled by the [place]. Pt complains of pain in left foot. Pt is on Neurontin 4 pills day. Pt complains of bac kpain  Pt informs that blood sugars are good. Pt says he is taking HTN meds. Past Medical History   Diagnosis Date    Back pain     Chronic obstructive pulmonary disease ([address]     Diabetes ([address]     Hypercholesterolemia     Hypertension     Sleep apnea      Does not use CPAP     Past Surgical History   Procedure Laterality Date    Hx hernia repair  2003     umbilacal     Hx shoulder arthroscopy  2004     bilateral    Colonoscopy N/A 1/23/2017     COLONOSCOPY performed by Squire Soulier, MD at [facility] ENDOSCOPY     Current Outpatient Prescriptions   Medication Sig    traMADol (ULTRAM) 50 mg tablet Take 1 Tab by mouth every eight (8) hours as needed for Pain. Max Daily Amount: 150 mg.    cetirizine (ZYRTEC) 10 mg tablet Take 1 Tab by mouth daily.  potassium chloride (K-DUR, KLOR-CON) 10 mEq tablet Take 1 Tab by mouth daily.  fluticasone-salmeterol (ADVAIR) 250-50 mcg/dose diskus inhaler Take 1 Puff by inhalation every twelve (12) hours.  pravastatin (PRAVACHOL) 40 mg tablet Take 1.5 Tabs by mouth nightly. Take one-half tablet by mouth at bedtime    losartan (COZAAR) 50 mg tablet Take  by mouth daily.  aspirin 81 mg chewable tablet Take 1 Tab by mouth daily.  carboxymethylcellulose sodium (REFRESH TEARS) 0.5 % drop ophthalmic solution Administer 1 Drop to both eyes two (2) times a day.  gabapentin (NEURONTIN) 300 mg capsule Take 1 Cap by mouth four (4) times daily.  cholecalciferol (VITAMIN D3) 1,000 unit tablet Take 1,000 Units by mouth daily.     albuterol (PROVENTIL HFA, VENTOLIN HFA, PROAIR HFA) 90 mcg/actuation inhaler Take 2 Puffs by inhalation every six (6) hours as needed for Wheezing.  polyethylene glycol (MIRALAX) 17 gram/dose powder Take 17 g by mouth daily.  metFORMIN (GLUCOPHAGE) 1,000 mg tablet Take 1,000 mg by mouth two (2) times daily (with meals).  pantoprazole (PROTONIX) 40 mg granules for oral suspension 40 mg daily.  hydrochlorothiazide (HYDRODIURIL) 25 mg tablet Take 25 mg by mouth daily.  propranolol LA (INDERAL LA) 120 mg SR capsule Take 120 mg by mouth daily.  glipiZIDE (GLUCOTROL) 10 mg tablet Take 10 mg by mouth three (3) times daily.  amLODIPine (NORVASC) 10 mg tablet Take 10 mg by mouth daily.  valsartan (DIOVAN) 80 mg tablet Take 1 Tab by mouth daily.  clobetasol (TEMOVATE) 0.05 % ointment Apply  to affected area two (2) times a day.  omega-3 fatty acids-vitamin e (FISH OIL) 1,000 mg cap Take 1 Cap by mouth two (2) times a day.  fluticasone (FLONASE) 50 mcg/actuation nasal spray 2 Sprays by Both Nostrils route daily.  lactulose (KRISTALOSE) 10 gram packet Take 10 g by mouth three (3) times daily. No current facility-administered medications for this visit. Health Maintenance   Topic Date Due    EYE EXAM RETINAL OR DILATED Q1  03/08/1972    FOOT EXAM Q1  09/17/2016    MICROALBUMIN Q1  02/17/2017    HEMOGLOBIN A1C Q6M  05/21/2017    LIPID PANEL Q1  07/27/2017    COLONOSCOPY  01/23/2022    DTaP/Tdap/Td series (2 - Td) 09/17/2025    Hepatitis C Screening  Completed    Pneumococcal 19-64 Medium Risk  Completed    INFLUENZA AGE 9 TO ADULT  Completed     Immunization History   Administered Date(s) Administered    Influenza Vaccine 09/17/2015    Influenza Vaccine (Quad) PF 10/28/2016    Pneumococcal Polysaccharide (PPSV-23) 07/27/2016    Tdap 09/17/2015     No LMP for male patient. Allergies and Intolerances:    Allergies   Allergen Reactions    Lisinopril Swelling       Family History:   Family History   Problem Relation Age of Onset    Cancer Mother     Hypertension Mother     Heart Disease Mother     Diabetes Mother     No Known Problems Father        Social History:   He  reports that he has been smoking Cigarettes. He has a 12.50 pack-year smoking history. He quit smokeless tobacco use about 10 months ago. He  reports that he drinks about 2.4 oz of alcohol per week             Review of Systems:   General: negative for - chills, fatigue, fever, weight change  Psych: negative for - anxiety, depression, irritability or mood swings  ENT: negative for - headaches, hearing change, nasal congestion, oral lesions, sneezing or sore throat  Heme/ Lymph: negative for - bleeding problems, bruising, pallor or swollen lymph nodes  Endo: negative for - hot flashes, polydipsia/polyuria or temperature intolerance  Resp: negative for - cough, shortness of breath or wheezing  CV: negative for - chest pain, edema or palpitations  GI: negative for - abdominal pain, change in bowel habits, constipation, diarrhea or nausea/vomiting  : negative for - dysuria, hematuria, incontinence, pelvic pain or vulvar/vaginal symptoms  MSK: negative for - joint pain, joint swelling or muscle pain,pos for back lew nand left foot pain  Neuro: negative for - confusion, headaches, seizures or weakness  Derm: negative for - dry skin, hair changes, rash or skin lesion changes          Physical:   Vitals:   Vitals:    02/03/17 1007   BP: 149/87   Pulse: 94   Resp: 17   Temp: 96.8 °F (36 °C)   TempSrc: Oral   SpO2: 95%   Weight: 197 lb (89.4 kg)   Height: 5' 9\" (1.753 m)           Exam:   HEENT- atraumatic,normocephalic, awake, oriented, well nourished  Neck - supple,no enlarged lymph nodes, no JVD, no thyromegaly  Chest- CTA, no rhonchi, no crackles  Heart- rrr, no murmurs / gallop/rub  Abdomen- soft,BS+,NT, no hepatosplenomegaly  Ext - no c/c/edema   Neuro- no focal deficits. Power 5/5 all extremities  Skin - warm,dry, no obvious cici. Review of Data:   LABS:   Lab Results   Component Value Date/Time    WBC 9.0 11/21/2016 12:35 PM    HGB 16.6 11/21/2016 12:35 PM    HCT 49.5 11/21/2016 12:35 PM    PLATELET 153 97/97/9684 12:35 PM     Lab Results   Component Value Date/Time    Sodium 139 11/21/2016 12:35 PM    Potassium 3.3 11/21/2016 12:35 PM    Chloride 97 11/21/2016 12:35 PM    CO2 35 11/21/2016 12:35 PM    Glucose 106 11/21/2016 12:35 PM    BUN 11 11/21/2016 12:35 PM    Creatinine 0.96 11/21/2016 12:35 PM     Lab Results   Component Value Date/Time    Cholesterol, total 195 07/27/2016 08:37 AM    HDL Cholesterol 49 07/27/2016 08:37 AM    LDL, calculated 112 07/27/2016 08:37 AM    Triglyceride 169 07/27/2016 08:37 AM     No results found for: GPT        Impression / Plan:        ICD-10-CM ICD-9-CM    1. Essential hypertension I10 401.9    2. Midline low back pain with right-sided sciatica, unspecified chronicity M54.41 724.3 traMADol (ULTRAM) 50 mg tablet   3. Other seasonal allergic rhinitis J30.2 477.8 cetirizine (ZYRTEC) 10 mg tablet   4. Hypokalemia E87.6 276.8 potassium chloride (K-DUR, KLOR-CON) 10 mEq tablet   5. Hypercholesteremia E78.00 272.0    6. Pain in both feet M79.671 729.5 REFERRAL TO PODIATRY    V12.505       DM/HTN- stable      Explained to patient risk benefits of the medications. Advised patient to stop meds if having any side effects. Pt verbalized understanding of the instructions. I have discussed the diagnosis with the patient and the intended plan as seen in the above orders. The patient has received an after-visit summary and questions were answered concerning future plans. I have discussed medication side effects and warnings with the patient as well. I have reviewed the plan of care with the patient, accepted their input and they are in agreement with the treatment goals. Reviewed plan of care. Patient has provided input and agrees with goals.     Follow-up Disposition:  Return in about 6 weeks (around 3/17/2017).     Andrew Pritchard MD

## 2017-02-15 DIAGNOSIS — M54.41 MIDLINE LOW BACK PAIN WITH RIGHT-SIDED SCIATICA, UNSPECIFIED CHRONICITY: ICD-10-CM

## 2017-02-15 RX ORDER — GABAPENTIN 300 MG/1
300 CAPSULE ORAL 4 TIMES DAILY
Qty: 120 CAP | Refills: 1 | Status: SHIPPED | OUTPATIENT
Start: 2017-02-15 | End: 2017-04-19 | Stop reason: SDUPTHER

## 2017-02-15 NOTE — TELEPHONE ENCOUNTER
Requested Prescriptions     Pending Prescriptions Disp Refills    gabapentin (NEURONTIN) 300 mg capsule 120 Cap 1     Sig: Take 1 Cap by mouth four (4) times daily.      Last office visit: 2/3/17  Next office visit: 3/16/17

## 2017-02-28 ENCOUNTER — TELEPHONE (OUTPATIENT)
Dept: FAMILY MEDICINE CLINIC | Age: 57
End: 2017-02-28

## 2017-02-28 RX ORDER — POTASSIUM CHLORIDE 750 MG/1
TABLET, FILM COATED, EXTENDED RELEASE ORAL
Qty: 30 TAB | Refills: 0 | Status: SHIPPED | OUTPATIENT
Start: 2017-02-28 | End: 2017-04-19

## 2017-03-07 ENCOUNTER — TELEPHONE (OUTPATIENT)
Dept: FAMILY MEDICINE CLINIC | Age: 57
End: 2017-03-07

## 2017-03-07 NOTE — TELEPHONE ENCOUNTER
[facility] called today stating that they received a referral for the pt but it had the wrong doctor on it. She is asking for a new referral for Dr. Amelia Yoon.  Please assist.    Fax [phone]

## 2017-03-16 ENCOUNTER — OFFICE VISIT (OUTPATIENT)
Dept: FAMILY MEDICINE CLINIC | Age: 57
End: 2017-03-16

## 2017-03-16 ENCOUNTER — TELEPHONE (OUTPATIENT)
Dept: FAMILY MEDICINE CLINIC | Age: 57
End: 2017-03-16

## 2017-03-16 VITALS
WEIGHT: 202.8 LBS | RESPIRATION RATE: 16 BRPM | DIASTOLIC BLOOD PRESSURE: 78 MMHG | HEART RATE: 95 BPM | TEMPERATURE: 97.8 F | SYSTOLIC BLOOD PRESSURE: 110 MMHG | HEIGHT: 69 IN | BODY MASS INDEX: 30.04 KG/M2 | OXYGEN SATURATION: 94 %

## 2017-03-16 DIAGNOSIS — J30.1 SEASONAL ALLERGIC RHINITIS DUE TO POLLEN: ICD-10-CM

## 2017-03-16 DIAGNOSIS — M54.41 MIDLINE LOW BACK PAIN WITH RIGHT-SIDED SCIATICA, UNSPECIFIED CHRONICITY: ICD-10-CM

## 2017-03-16 DIAGNOSIS — G89.29 CHRONIC BILATERAL BACK PAIN, UNSPECIFIED BACK LOCATION: Primary | ICD-10-CM

## 2017-03-16 DIAGNOSIS — M54.9 CHRONIC BILATERAL BACK PAIN, UNSPECIFIED BACK LOCATION: Primary | ICD-10-CM

## 2017-03-16 RX ORDER — BUDESONIDE 3 MG/1
6 CAPSULE, COATED PELLETS ORAL
COMMUNITY
End: 2017-04-19

## 2017-03-16 RX ORDER — PREDNISONE 20 MG/1
TABLET ORAL
Qty: 14 TAB | Refills: 0 | Status: SHIPPED | OUTPATIENT
Start: 2017-03-16 | End: 2017-04-19

## 2017-03-16 RX ORDER — TRAMADOL HYDROCHLORIDE 50 MG/1
50 TABLET ORAL
Qty: 40 TAB | Refills: 0 | Status: SHIPPED | OUTPATIENT
Start: 2017-03-16 | End: 2017-04-19 | Stop reason: SDUPTHER

## 2017-03-16 RX ORDER — AMOXICILLIN 500 MG/1
500 CAPSULE ORAL 3 TIMES DAILY
Qty: 30 CAP | Refills: 0 | Status: SHIPPED | OUTPATIENT
Start: 2017-03-16 | End: 2017-03-26

## 2017-03-16 NOTE — MR AVS SNAPSHOT
Visit Information Date & Time Provider Department Dept. Phone Encounter #  
 3/16/2017  9:45 AM Tina Elizabeth, [address] [facility] [phone] [id number] Follow-up Instructions Return in about 5 weeks (around 4/20/2017) for Medicare wellness . Upcoming Health Maintenance Date Due  
 EYE EXAM RETINAL OR DILATED Q1 3/8/1972 FOOT EXAM Q1 9/17/2016 MEDICARE YEARLY EXAM 2/17/2017 MICROALBUMIN Q1 2/17/2017 HEMOGLOBIN A1C Q6M 5/21/2017 LIPID PANEL Q1 7/27/2017 COLONOSCOPY 1/23/2022 DTaP/Tdap/Td series (2 - Td) 9/17/2025 Allergies as of 3/16/2017  Review Complete On: 3/16/2017 By: Tina Elizabeth MD  
  
 Severity Noted Reaction Type Reactions Lisinopril  03/01/2016    Swelling Current Immunizations  Never Reviewed Name Date Influenza Vaccine 9/17/2015  9:38 AM  
 Influenza Vaccine (Quad) PF 10/28/2016 Pneumococcal Polysaccharide (PPSV-23) 7/27/2016 Tdap 9/17/2015  9:39 AM  
  
 Not reviewed this visit You Were Diagnosed With   
  
 Codes Comments Chronic bilateral back pain, unspecified back location    -  Primary ICD-10-CM: M54.9, G89.29 ICD-9-CM: 724.5, 338.29 Midline low back pain with right-sided sciatica, unspecified chronicity     ICD-10-CM: M54.41 
ICD-9-CM: 724.3 Seasonal allergic rhinitis due to pollen     ICD-10-CM: J30.1 ICD-9-CM: 477.0 Vitals BP Pulse Temp Resp Height(growth percentile) Weight(growth percentile) 110/78 95 97.8 °F (36.6 °C) (Oral) 16 5' 9\" (1.753 m) 202 lb 12.8 oz (92 kg) SpO2 BMI Smoking Status 94% 29.95 kg/m2 Current Every Day Smoker Vitals History BMI and BSA Data Body Mass Index Body Surface Area  
 29.95 kg/m 2 2.12 m 2 Preferred Pharmacy Pharmacy Name Phone [facility] [address] [place] [address] [phone] Your Updated Medication List  
  
   
 This list is accurate as of: 3/16/17 10:33 AM.  Always use your most recent med list.  
  
  
  
  
 albuterol 90 mcg/actuation inhaler Commonly known as:  PROVENTIL HFA, VENTOLIN HFA, PROAIR HFA Take 2 Puffs by inhalation every six (6) hours as needed for Wheezing. amLODIPine 10 mg tablet Commonly known as:  Cuauhtemoc Alstrom Take 10 mg by mouth daily. amoxicillin 500 mg capsule Commonly known as:  AMOXIL Take 1 Cap by mouth three (3) times daily for 10 days. aspirin 81 mg chewable tablet Take 1 Tab by mouth daily. budesonide 3 mg capsule Commonly known as:  ENTOCORT EC Take 6 mg by mouth every morning. carboxymethylcellulose sodium 0.5 % Drop ophthalmic solution Commonly known as:  REFRESH TEARS Administer 1 Drop to both eyes two (2) times a day. cetirizine 10 mg tablet Commonly known as:  ZYRTEC Take 1 Tab by mouth daily. cholecalciferol 1,000 unit tablet Commonly known as:  VITAMIN D3 Take 1,000 Units by mouth daily. clobetasol 0.05 % ointment Commonly known as:  Alvester Car Apply  to affected area two (2) times a day. fluticasone 50 mcg/actuation nasal spray Commonly known as:  [place] 2 Sprays by Both Nostrils route daily. fluticasone-salmeterol 250-50 mcg/dose diskus inhaler Commonly known as:  ADVAIR Take 1 Puff by inhalation every twelve (12) hours. gabapentin 300 mg capsule Commonly known as:  NEURONTIN Take 1 Cap by mouth four (4) times daily. glipiZIDE 10 mg tablet Commonly known as:  Seldon Shyla Take 10 mg by mouth three (3) times daily. hydroCHLOROthiazide 25 mg tablet Commonly known as:  HYDRODIURIL Take 25 mg by mouth daily. lactulose 10 gram packet Commonly known as:  Margart Peng Take 10 g by mouth three (3) times daily. losartan 50 mg tablet Commonly known as:  COZAAR Take  by mouth daily. metFORMIN 1,000 mg tablet Commonly known as:  GLUCOPHAGE  
 Take 1,000 mg by mouth two (2) times daily (with meals). omega-3 fatty acids-vitamin e 1,000 mg Cap Commonly known as:  FISH OIL Take 1 Cap by mouth two (2) times a day. polyethylene glycol 17 gram/dose powder Commonly known as:  Chelle Gordillo Take 17 g by mouth daily. * potassium chloride 10 mEq tablet Commonly known as:  K-DUR, KLOR-CON Take 1 Tab by mouth daily. * potassium chloride SR 10 mEq tablet Commonly known as:  KLOR-CON 10  
TAKE ONE TABLET BY MOUTH ONCE DAILY pravastatin 40 mg tablet Commonly known as:  PRAVACHOL Take 1.5 Tabs by mouth nightly. Take one-half tablet by mouth at bedtime  
  
 predniSONE 20 mg tablet Commonly known as:  Chandler Razor Take 2 tabs po daily for 7 days  
  
 propranolol  mg SR capsule Commonly known as:  INDERAL LA Take 120 mg by mouth daily. PROTONIX 40 mg granules for oral suspension Generic drug:  pantoprazole 40 mg daily. traMADol 50 mg tablet Commonly known as:  ULTRAM  
Take 1 Tab by mouth every eight (8) hours as needed for Pain. Max Daily Amount: 150 mg.  
  
 valsartan 80 mg tablet Commonly known as:  DIOVAN Take 1 Tab by mouth daily. * Notice: This list has 2 medication(s) that are the same as other medications prescribed for you. Read the directions carefully, and ask your doctor or other care provider to review them with you. Prescriptions Printed Refills  
 traMADol (ULTRAM) 50 mg tablet 0 Sig: Take 1 Tab by mouth every eight (8) hours as needed for Pain. Max Daily Amount: 150 mg.  
 Class: Print Route: Oral  
  
Prescriptions Sent to Pharmacy Refills  
 predniSONE (DELTASONE) 20 mg tablet 0 Sig: Take 2 tabs po daily for 7 days Class: Normal  
 Pharmacy: [facility] [address] [place] [address].  #: [phone]  
 amoxicillin (AMOXIL) 500 mg capsule 0 Sig: Take 1 Cap by mouth three (3) times daily for 10 days. Class: Normal  
 Pharmacy: [address] [facility] [address] [place] [address].  #: [phone] Route: Oral  
  
We Performed the Following REFERRAL TO ENT-OTOLARYNGOLOGY [FCD75 Custom] REFERRAL TO PAIN MANAGEMENT [JDO703 Custom] Follow-up Instructions Return in about 5 weeks (around 4/20/2017) for Medicare wellness . Referral Information Referral ID Referred By Referred To  
  
 [id number] Dao CASTANO Not Available Visits Status Start Date End Date 1 New Request 3/16/17 3/16/18 If your referral has a status of pending review or denied, additional information will be sent to support the outcome of this decision. Referral ID Referred By Referred To  
 [id number] Dao CASTANO Not Available Visits Status Start Date End Date 1 New Request 3/16/17 3/16/18 If your referral has a status of pending review or denied, additional information will be sent to support the outcome of this decision. Introducing [facility]! Dear Duke Crane: Thank you for requesting a [employer] account. Our records indicate that you already have an active [employer] account. You can access your account anytime at [email or web address] Did you know that you can access your hospital and ER discharge instructions at any time in [employer]? You can also review all of your test results from your hospital stay or ER visit. Additional Information If you have questions, please visit the Frequently Asked Questions section of the [employer] website at [email or web address] Remember, [employer] is NOT to be used for urgent needs. For medical emergencies, dial 911. Now available from your iPhone and Android! Please provide this summary of care documentation to your next provider. Your primary care clinician is listed as Mariaelena Medicus. If you have any questions after today's visit, please call [phone].

## 2017-03-16 NOTE — PROGRESS NOTES
Isha Johnson is a 56 y.o.  male and presents with     Chief Complaint   Patient presents with    COPD    Breathing Problem    Back Pain    Diabetes    Hypertension       Pt has been smoking cigareettes. He has trouble breathing. He has cough. He cant breateh through his nose. Pt does have chronci back pain He says he got injections in his back last year that helped him some. He says trmadol helps him a little bit but not much. Pt reports that DM and HTN are doing good. Past Medical History:   Diagnosis Date    Back pain     Chronic obstructive pulmonary disease ([address]     Diabetes [facility]     Hypercholesterolemia     Hypertension     Sleep apnea     Does not use CPAP     Past Surgical History:   Procedure Laterality Date    COLONOSCOPY N/A 1/23/2017    COLONOSCOPY performed by Edyta Britton MD at [address] [place] [address] [address]  [address]    umbilacal     HX SHOULDER ARTHROSCOPY  2004    bilateral     Current Outpatient Prescriptions   Medication Sig    budesonide (ENTOCORT EC) 3 mg capsule Take 6 mg by mouth every morning.  traMADol (ULTRAM) 50 mg tablet Take 1 Tab by mouth every eight (8) hours as needed for Pain. Max Daily Amount: 150 mg.  predniSONE (DELTASONE) 20 mg tablet Take 2 tabs po daily for 7 days    amoxicillin (AMOXIL) 500 mg capsule Take 1 Cap by mouth three (3) times daily for 10 days.  potassium chloride SR (KLOR-CON 10) 10 mEq tablet TAKE ONE TABLET BY MOUTH ONCE DAILY    gabapentin (NEURONTIN) 300 mg capsule Take 1 Cap by mouth four (4) times daily.  cetirizine (ZYRTEC) 10 mg tablet Take 1 Tab by mouth daily.  fluticasone-salmeterol (ADVAIR) 250-50 mcg/dose diskus inhaler Take 1 Puff by inhalation every twelve (12) hours.  pravastatin (PRAVACHOL) 40 mg tablet Take 1.5 Tabs by mouth nightly. Take one-half tablet by mouth at bedtime    losartan (COZAAR) 50 mg tablet Take  by mouth daily.     aspirin 81 mg chewable tablet Take 1 Tab by mouth daily.    carboxymethylcellulose sodium (REFRESH TEARS) 0.5 % drop ophthalmic solution Administer 1 Drop to both eyes two (2) times a day.  clobetasol (TEMOVATE) 0.05 % ointment Apply  to affected area two (2) times a day.  cholecalciferol (VITAMIN D3) 1,000 unit tablet Take 1,000 Units by mouth daily.  albuterol (PROVENTIL HFA, VENTOLIN HFA, PROAIR HFA) 90 mcg/actuation inhaler Take 2 Puffs by inhalation every six (6) hours as needed for Wheezing.  omega-3 fatty acids-vitamin e (FISH OIL) 1,000 mg cap Take 1 Cap by mouth two (2) times a day.  metFORMIN (GLUCOPHAGE) 1,000 mg tablet Take 1,000 mg by mouth two (2) times daily (with meals).  pantoprazole (PROTONIX) 40 mg granules for oral suspension 40 mg daily.  hydrochlorothiazide (HYDRODIURIL) 25 mg tablet Take 25 mg by mouth daily.  propranolol LA (INDERAL LA) 120 mg SR capsule Take 120 mg by mouth daily.  glipiZIDE (GLUCOTROL) 10 mg tablet Take 10 mg by mouth three (3) times daily.  amLODIPine (NORVASC) 10 mg tablet Take 10 mg by mouth daily.  potassium chloride (K-DUR, KLOR-CON) 10 mEq tablet Take 1 Tab by mouth daily.  valsartan (DIOVAN) 80 mg tablet Take 1 Tab by mouth daily.  polyethylene glycol (MIRALAX) 17 gram/dose powder Take 17 g by mouth daily.  fluticasone (FLONASE) 50 mcg/actuation nasal spray 2 Sprays by Both Nostrils route daily.  lactulose (KRISTALOSE) 10 gram packet Take 10 g by mouth three (3) times daily. No current facility-administered medications for this visit.       Health Maintenance   Topic Date Due    EYE EXAM RETINAL OR DILATED Q1  03/08/1972    FOOT EXAM Q1  09/17/2016    MEDICARE YEARLY EXAM  02/17/2017    MICROALBUMIN Q1  02/17/2017    HEMOGLOBIN A1C Q6M  05/21/2017    LIPID PANEL Q1  07/27/2017    COLONOSCOPY  01/23/2022    DTaP/Tdap/Td series (2 - Td) 09/17/2025    Hepatitis C Screening  Completed    Pneumococcal 19-64 Medium Risk  Completed    INFLUENZA AGE 9 TO ADULT Completed     Immunization History   Administered Date(s) Administered    Influenza Vaccine 09/17/2015    Influenza Vaccine (Quad) PF 10/28/2016    Pneumococcal Polysaccharide (PPSV-23) 07/27/2016    Tdap 09/17/2015     No LMP for male patient. Allergies and Intolerances: Allergies   Allergen Reactions    Lisinopril Swelling       Family History:   Family History   Problem Relation Age of Onset    Cancer Mother     Hypertension Mother     Heart Disease Mother     Diabetes Mother     No Known Problems Father        Social History:   He  reports that he has been smoking Cigarettes. He has a 12.50 pack-year smoking history. He quit smokeless tobacco use about a year ago.   He  reports that he drinks about 2.4 oz of alcohol per week             Review of Systems:   General: negative for - chills, fatigue, fever, weight change  Psych: negative for - anxiety, depression, irritability or mood swings  ENT: negative for - headaches, hearing change, nasal congestion, oral lesions, sneezing or sore throat  Heme/ Lymph: negative for - bleeding problems, bruising, pallor or swollen lymph nodes  Endo: negative for - hot flashes, polydipsia/polyuria or temperature intolerance  Resp: negative for - cough, shortness of breath or wheezing  CV: negative for - chest pain, edema or palpitations  GI: negative for - abdominal pain, change in bowel habits, constipation, diarrhea or nausea/vomiting  : negative for - dysuria, hematuria, incontinence, pelvic pain or vulvar/vaginal symptoms  MSK: negative for - joint pain, joint swelling or muscle pain,pos for back pain  Neuro: negative for - confusion, headaches, seizures or weakness  Derm: negative for - dry skin, hair changes, rash or skin lesion changes          Physical:   Vitals:   Vitals:    03/16/17 0952 03/16/17 1031   BP: 110/78    Pulse: 95    Resp: 16    Temp: 97.8 °F (36.6 °C)    TempSrc: Oral    SpO2: 91% 94%   Weight: 202 lb 12.8 oz (92 kg)    Height: 5' 9\" (1.753 m)            Exam:   HEENT- atraumatic,normocephalic, awake, oriented, well nourished, enlarged nasal turbinates billateral  Neck - supple,no enlarged lymph nodes, no JVD, no thyromegaly  Chest- CTA, no rhonchi, no crackles, exp wheezing  Heart- rrr, no murmurs / gallop/rub  Abdomen- soft,BS+,NT, no hepatosplenomegaly  Ext - no c/c/edema   Neuro- no focal deficits. Power 5/5 all extremities  Skin - warm,dry, no obvious rashes. Review of Data:   LABS:   Lab Results   Component Value Date/Time    WBC 9.0 11/21/2016 12:35 PM    HGB 16.6 11/21/2016 12:35 PM    HCT 49.5 11/21/2016 12:35 PM    PLATELET 619 99/71/5376 12:35 PM     Lab Results   Component Value Date/Time    Sodium 139 11/21/2016 12:35 PM    Potassium 3.3 11/21/2016 12:35 PM    Chloride 97 11/21/2016 12:35 PM    CO2 35 11/21/2016 12:35 PM    Glucose 106 11/21/2016 12:35 PM    BUN 11 11/21/2016 12:35 PM    Creatinine 0.96 11/21/2016 12:35 PM     Lab Results   Component Value Date/Time    Cholesterol, total 195 07/27/2016 08:37 AM    HDL Cholesterol 49 07/27/2016 08:37 AM    LDL, calculated 112 07/27/2016 08:37 AM    Triglyceride 169 07/27/2016 08:37 AM     No results found for: GPT        Impression / Plan:        ICD-10-CM ICD-9-CM    1. Chronic bilateral back pain, unspecified back location M54.9 724.5     G89.29 338.29    2. Midline low back pain with right-sided sciatica, unspecified chronicity M54.41 724.3 traMADol (ULTRAM) 50 mg tablet      REFERRAL TO PAIN MANAGEMENT   3. Seasonal allergic rhinitis due to pollen J30.1 477.0 REFERRAL TO ENT-OTOLARYNGOLOGY      predniSONE (DELTASONE) 20 mg tablet      amoxicillin (AMOXIL) 500 mg capsule     stop smoking    Go to ER for acute symptoms  . DM/HTN- stable        Explained to patient risk benefits of the medications. Advised patient to stop meds if having any side effects. Pt verbalized understanding of the instructions.     I have discussed the diagnosis with the patient and the intended plan as seen in the above orders. The patient has received an after-visit summary and questions were answered concerning future plans. I have discussed medication side effects and warnings with the patient as well. I have reviewed the plan of care with the patient, accepted their input and they are in agreement with the treatment goals. Reviewed plan of care. Patient has provided input and agrees with goals.     Follow-up Disposition: Not on Fara Ellis MD

## 2017-03-16 NOTE — TELEPHONE ENCOUNTER
Pt calling in to ask if he can get a Rx called in for a muscle relaxer. Francine Shaffer He says he forgot to ask at his appt this morning. Please let him know if this can be done.

## 2017-03-16 NOTE — PROGRESS NOTES
1. Have you been to the ER, urgent care clinic since your last visit? Hospitalized since your last visit? No    2. Have you seen or consulted any other health care providers outside of the [facility] [place] since your last visit? Include any pap smears or colon screening.  No

## 2017-04-19 ENCOUNTER — HOSPITAL ENCOUNTER (OUTPATIENT)
Dept: LAB | Age: 57
Discharge: HOME | End: 2017-04-19
Payer: MEDICARE

## 2017-04-19 ENCOUNTER — OFFICE VISIT (OUTPATIENT)
Dept: FAMILY MEDICINE CLINIC | Age: 57
End: 2017-04-19

## 2017-04-19 VITALS
BODY MASS INDEX: 31.04 KG/M2 | WEIGHT: 209.6 LBS | DIASTOLIC BLOOD PRESSURE: 100 MMHG | OXYGEN SATURATION: 91 % | HEIGHT: 69 IN | HEART RATE: 98 BPM | SYSTOLIC BLOOD PRESSURE: 156 MMHG | TEMPERATURE: 97.2 F

## 2017-04-19 DIAGNOSIS — E11.9 TYPE 2 DIABETES MELLITUS WITHOUT COMPLICATION, UNSPECIFIED LONG TERM INSULIN USE STATUS: ICD-10-CM

## 2017-04-19 DIAGNOSIS — Z00.00 ROUTINE GENERAL MEDICAL EXAMINATION AT A HEALTH CARE FACILITY: ICD-10-CM

## 2017-04-19 DIAGNOSIS — M54.41 MIDLINE LOW BACK PAIN WITH RIGHT-SIDED SCIATICA, UNSPECIFIED CHRONICITY: ICD-10-CM

## 2017-04-19 DIAGNOSIS — Z13.39 SCREENING FOR ALCOHOLISM: ICD-10-CM

## 2017-04-19 DIAGNOSIS — Z00.00 MEDICARE ANNUAL WELLNESS VISIT, SUBSEQUENT: Primary | ICD-10-CM

## 2017-04-19 DIAGNOSIS — E78.00 HYPERCHOLESTEREMIA: ICD-10-CM

## 2017-04-19 DIAGNOSIS — I10 ESSENTIAL HYPERTENSION: ICD-10-CM

## 2017-04-19 LAB
ALBUMIN SERPL BCP-MCNC: 3.5 G/DL (ref 3.4–5)
ALBUMIN/GLOB SERPL: 1 {RATIO} (ref 0.8–1.7)
ALP SERPL-CCNC: 67 U/L (ref 45–117)
ALT SERPL-CCNC: 26 U/L (ref 16–61)
ANION GAP BLD CALC-SCNC: 8 MMOL/L (ref 3–18)
AST SERPL W P-5'-P-CCNC: 17 U/L (ref 15–37)
BASOPHILS # BLD AUTO: 0 K/UL (ref 0–0.06)
BASOPHILS # BLD: 0 % (ref 0–2)
BILIRUB SERPL-MCNC: 0.3 MG/DL (ref 0.2–1)
BUN SERPL-MCNC: 7 MG/DL (ref 7–18)
BUN/CREAT SERPL: 7 (ref 12–20)
CALCIUM SERPL-MCNC: 9.4 MG/DL (ref 8.5–10.1)
CHLORIDE SERPL-SCNC: 97 MMOL/L (ref 100–108)
CHOLEST SERPL-MCNC: 201 MG/DL
CO2 SERPL-SCNC: 34 MMOL/L (ref 21–32)
CREAT SERPL-MCNC: 0.98 MG/DL (ref 0.6–1.3)
CREAT UR-MCNC: 275.53 MG/DL (ref 30–125)
DIFFERENTIAL METHOD BLD: ABNORMAL
EOSINOPHIL # BLD: 0.2 K/UL (ref 0–0.4)
EOSINOPHIL NFR BLD: 2 % (ref 0–5)
ERYTHROCYTE [DISTWIDTH] IN BLOOD BY AUTOMATED COUNT: 13.4 % (ref 11.6–14.5)
EST. AVERAGE GLUCOSE BLD GHB EST-MCNC: 169 MG/DL
GLOBULIN SER CALC-MCNC: 3.5 G/DL (ref 2–4)
GLUCOSE SERPL-MCNC: 211 MG/DL (ref 74–99)
HBA1C MFR BLD: 7.5 % (ref 4.2–5.6)
HCT VFR BLD AUTO: 41.9 % (ref 36–48)
HDLC SERPL-MCNC: 45 MG/DL (ref 40–60)
HDLC SERPL: 4.5 {RATIO} (ref 0–5)
HGB BLD-MCNC: 13.9 G/DL (ref 13–16)
LDLC SERPL CALC-MCNC: 82.6 MG/DL (ref 0–100)
LIPID PROFILE,FLP: ABNORMAL
LYMPHOCYTES # BLD AUTO: 40 % (ref 21–52)
LYMPHOCYTES # BLD: 3.5 K/UL (ref 0.9–3.6)
MCH RBC QN AUTO: 32.9 PG (ref 24–34)
MCHC RBC AUTO-ENTMCNC: 33.2 G/DL (ref 31–37)
MCV RBC AUTO: 99.1 FL (ref 74–97)
MICROALBUMIN UR-MCNC: 131.3 MG/DL (ref 0–3)
MICROALBUMIN/CREAT UR-RTO: 477 MG/G (ref 0–30)
MONOCYTES # BLD: 0.6 K/UL (ref 0.05–1.2)
MONOCYTES NFR BLD AUTO: 7 % (ref 3–10)
NEUTS SEG # BLD: 4.5 K/UL (ref 1.8–8)
NEUTS SEG NFR BLD AUTO: 51 % (ref 40–73)
PLATELET # BLD AUTO: 265 K/UL (ref 135–420)
PMV BLD AUTO: 11.3 FL (ref 9.2–11.8)
POTASSIUM SERPL-SCNC: 4.6 MMOL/L (ref 3.5–5.5)
PROT SERPL-MCNC: 7 G/DL (ref 6.4–8.2)
RBC # BLD AUTO: 4.23 M/UL (ref 4.7–5.5)
SODIUM SERPL-SCNC: 139 MMOL/L (ref 136–145)
TRIGL SERPL-MCNC: 367 MG/DL (ref ?–150)
VLDLC SERPL CALC-MCNC: 73.4 MG/DL
WBC # BLD AUTO: 8.5 K/UL (ref 4.6–13.2)

## 2017-04-19 PROCEDURE — 80053 COMPREHEN METABOLIC PANEL: CPT | Performed by: INTERNAL MEDICINE

## 2017-04-19 PROCEDURE — 36415 COLL VENOUS BLD VENIPUNCTURE: CPT | Performed by: INTERNAL MEDICINE

## 2017-04-19 PROCEDURE — 82043 UR ALBUMIN QUANTITATIVE: CPT | Performed by: INTERNAL MEDICINE

## 2017-04-19 PROCEDURE — 80061 LIPID PANEL: CPT | Performed by: INTERNAL MEDICINE

## 2017-04-19 PROCEDURE — 85025 COMPLETE CBC W/AUTO DIFF WBC: CPT | Performed by: INTERNAL MEDICINE

## 2017-04-19 PROCEDURE — 83036 HEMOGLOBIN GLYCOSYLATED A1C: CPT | Performed by: INTERNAL MEDICINE

## 2017-04-19 RX ORDER — GABAPENTIN 300 MG/1
300 CAPSULE ORAL 4 TIMES DAILY
Qty: 120 CAP | Refills: 1 | Status: SHIPPED | OUTPATIENT
Start: 2017-04-19 | End: 2017-08-28 | Stop reason: SDUPTHER

## 2017-04-19 RX ORDER — IBUPROFEN 400 MG/1
TABLET ORAL
COMMUNITY
End: 2017-04-19

## 2017-04-19 RX ORDER — TRAMADOL HYDROCHLORIDE 50 MG/1
50 TABLET ORAL
Qty: 40 TAB | Refills: 0 | Status: SHIPPED | OUTPATIENT
Start: 2017-04-19 | End: 2017-08-28 | Stop reason: SDUPTHER

## 2017-04-19 NOTE — PROGRESS NOTES
This is a Subsequent Medicare Annual Wellness Visit providing Personalized Prevention Plan Services (PPPS) (Performed 12 months after initial AWV and PPPS )    I have reviewed the patient's medical history in detail and updated the computerized patient record. History     Past Medical History:   Diagnosis Date    Back pain     Chronic obstructive pulmonary disease ([address]     Diabetes ([facility] [address]     Hypercholesterolemia     Hypertension     Sleep apnea     Does not use CPAP      Past Surgical History:   Procedure Laterality Date    COLONOSCOPY N/A 1/23/2017    COLONOSCOPY performed by Elias Hernandez MD at [address] [place] [address] St  2003    umbilacal     HX SHOULDER ARTHROSCOPY  2004    bilateral     Current Outpatient Prescriptions   Medication Sig Dispense Refill    traMADol (ULTRAM) 50 mg tablet Take 1 Tab by mouth every eight (8) hours as needed for Pain. Max Daily Amount: 150 mg. 40 Tab 0    gabapentin (NEURONTIN) 300 mg capsule Take 1 Cap by mouth four (4) times daily. 120 Cap 1    cetirizine (ZYRTEC) 10 mg tablet Take 1 Tab by mouth daily. 30 Tab 3    fluticasone-salmeterol (ADVAIR) 250-50 mcg/dose diskus inhaler Take 1 Puff by inhalation every twelve (12) hours. 2 Inhaler 3    pravastatin (PRAVACHOL) 40 mg tablet Take 1.5 Tabs by mouth nightly. Take one-half tablet by mouth at bedtime 90 Tab 1    losartan (COZAAR) 50 mg tablet Take  by mouth daily.  aspirin 81 mg chewable tablet Take 1 Tab by mouth daily. 90 Tab 1    carboxymethylcellulose sodium (REFRESH TEARS) 0.5 % drop ophthalmic solution Administer 1 Drop to both eyes two (2) times a day. 30 mL 3    clobetasol (TEMOVATE) 0.05 % ointment Apply  to affected area two (2) times a day. 15 g 2    cholecalciferol (VITAMIN D3) 1,000 unit tablet Take 1,000 Units by mouth daily.  albuterol (PROVENTIL HFA, VENTOLIN HFA, PROAIR HFA) 90 mcg/actuation inhaler Take 2 Puffs by inhalation every six (6) hours as needed for Wheezing.  1 Inhaler 3    omega-3 fatty acids-vitamin e (FISH OIL) 1,000 mg cap Take 1 Cap by mouth two (2) times a day. 60 Cap 3    polyethylene glycol (MIRALAX) 17 gram/dose powder Take 17 g by mouth daily. 850 g 3    fluticasone (FLONASE) 50 mcg/actuation nasal spray 2 Sprays by Both Nostrils route daily. 3 Bottle 4    metFORMIN (GLUCOPHAGE) 1,000 mg tablet Take 1,000 mg by mouth two (2) times daily (with meals).  pantoprazole (PROTONIX) 40 mg granules for oral suspension 40 mg daily.  hydrochlorothiazide (HYDRODIURIL) 25 mg tablet Take 25 mg by mouth daily.  propranolol LA (INDERAL LA) 120 mg SR capsule Take 120 mg by mouth daily.  glipiZIDE (GLUCOTROL) 10 mg tablet Take 10 mg by mouth three (3) times daily.  amLODIPine (NORVASC) 10 mg tablet Take 10 mg by mouth daily.        Allergies   Allergen Reactions    Lisinopril Swelling     Family History   Problem Relation Age of Onset   [facility] Cancer Mother     Hypertension Mother     Heart Disease Mother     Diabetes Mother     No Known Problems Father      Social History   Substance Use Topics    Smoking status: Current Every Day Smoker     Packs/day: 0.50     Years: 25.00     Types: Cigarettes    Smokeless tobacco: Former User     Quit date: 3/16/2016    Alcohol use 2.4 oz/week     0 Standard drinks or equivalent, 4 Shots of liquor per week     Patient Active Problem List   Diagnosis Code    Midline low back pain without sciatica M54.5    Type 2 diabetes mellitus without complication ([facility] [address] U63.7    Essential hypertension I10    Hypercholesteremia E78.00    Hip pain M25.559    JAIMEE (obstructive sleep apnea) G47.33    Clubbing of nail R68.3    Chronic obstructive pulmonary disease (HCC) J44.9       Depression Risk Factor Screening:     PHQ 2 / 9, over the last two weeks 11/21/2016   Little interest or pleasure in doing things Not at all   Feeling down, depressed or hopeless Not at all   Total Score PHQ 2 0     Alcohol Risk Factor Screening: On any occasion during the past 3 months, have you had more than 4 drinks containing alcohol? Yes    Do you average more than 14 drinks per week? No      Functional Ability and Level of Safety:     Hearing Loss   none    Activities of Daily Living   Self-care. Requires assistance with: no ADLs    Fall Risk   No flowsheet data found. Abuse Screen   Patient is not abused    Review of Systems   A comprehensive review of systems was negative except for: Musculoskeletal: positive for back pain    Physical Examination     Evaluation of Cognitive Function:  Mood/affect:  neutral  Appearance: age appropriate  Family member/caregiver input: none    Visit Vitals    BP (!) 156/100    Pulse 98    Temp 97.2 °F (36.2 °C) (Oral)    Ht 5' 9\" (1.753 m)    Wt 209 lb 9.6 oz (95.1 kg)    SpO2 91%    BMI 30.95 kg/m2     General:  Alert, cooperative, no distress, appears stated age. Head:  Normocephalic, without obvious abnormality, atraumatic. Eyes:  Conjunctivae/corneas clear. PERRL, EOMs intact. Fundi benign   Ears:  Normal TMs and external ear canals both ears. Nose: Nares normal. Septum midline. Mucosa normal. No drainage or sinus tenderness. Throat: Lips, mucosa, and tongue normal. Teeth and gums normal.   Neck: Supple, symmetrical, trachea midline, no adenopathy, thyroid: no enlargement/tenderness/nodules, no carotid bruit and no JVD. Back:   Symmetric, no curvature. ROM normal. No CVA tenderness. Lungs:   Clear to auscultation bilaterally. Chest wall:  No tenderness or deformity. Heart:  Regular rate and rhythm, S1, S2 normal, no murmur, click, rub or gallop. Abdomen:   Soft, non-tender. Bowel sounds normal. No masses,  No organomegaly. Genitalia:  Normal male without lesion, discharge or tenderness. Extremities: Extremities normal, atraumatic, no cyanosis or edema. Pulses: 2+ and symmetric all extremities.    Skin: Skin color, texture, turgor normal. No rashes or lesions   Lymph nodes: Cervical, supraclavicular, and axillary nodes normal.   Neurologic: CNII-XII intact. Normal strength, sensation and reflexes throughout.   Back - mild lower avinash k tednerness  Diabetic foot exam:     Left: Reflexes 2+     Vibratory sensation normal    Proprioception normal   Sharp/dull discrimination normal    Filament test reduced sensation with micro filament   Pulse DP: 1+ (weak)   Pulse PT: 1+ (weak)   Deformities: None  Right: Reflexes 2+   Vibratory sensation normal   Proprioception normal   Sharp/dull discrimination normal   Filament test reduced sensation with micro filament   Pulse DP: 1+ (weak)   Pulse PT: 1+ (weak)   Deformities: None       Patient Care Team:  [id number] Cantu MD as PCP - General (Internal Medicine)  Kali Little MD (Orthopedic Surgery)  Chioma Momin DPM (Podiatry)    Advice/Referrals/Counseling   Education and counseling provided:  Are appropriate based on today's review and evaluation      Assessment/Plan   the following changes in treatment are made: prescribe trmadol for pain, DM - order labs,.        ---------------------------------------------------------------------  Chronic  condition      DM/HTN/Hyperchol/Back pain/Obesity

## 2017-04-19 NOTE — PROGRESS NOTES
1. Have you been to the ER, urgent care clinic since your last visit? Hospitalized since your last visit? No    2. Have you seen or consulted any other health care providers outside of the [address] [place] [address] since your last visit? Include any pap smears or colon screening.  No

## 2017-04-19 NOTE — PATIENT INSTRUCTIONS

## 2017-04-19 NOTE — MR AVS SNAPSHOT
Visit Information Date & Time Provider Department Dept. Phone Encounter #  
 4/19/2017  9:30 AM [id number], [address] [facility] [phone] Follow-up Instructions Return in 3 months (on 7/19/2017). Upcoming Health Maintenance Date Due  
 EYE EXAM RETINAL OR DILATED Q1 3/8/1972 HEMOGLOBIN A1C Q6M 5/21/2017 LIPID PANEL Q1 7/27/2017 FOOT EXAM Q1 3/21/2018 MICROALBUMIN Q1 4/19/2018 MEDICARE YEARLY EXAM 4/20/2018 COLONOSCOPY 1/23/2022 DTaP/Tdap/Td series (2 - Td) 9/17/2025 Allergies as of 4/19/2017  Review Complete On: 4/19/2017 By: Mya Richardson LPN Severity Noted Reaction Type Reactions Lisinopril  03/01/2016    Swelling Current Immunizations  Never Reviewed Name Date Influenza Vaccine 9/17/2015  9:38 AM  
 Influenza Vaccine (Quad) PF 10/28/2016 Pneumococcal Polysaccharide (PPSV-23) 7/27/2016 Tdap 9/17/2015  9:39 AM  
  
 Not reviewed this visit You Were Diagnosed With   
  
 Codes Comments Medicare annual wellness visit, subsequent    -  Primary ICD-10-CM: Z00.00 ICD-9-CM: V70.0 Midline low back pain with right-sided sciatica, unspecified chronicity     ICD-10-CM: M54.41 
ICD-9-CM: 102. 3 Type 2 diabetes mellitus without complication, unspecified long term insulin use status     ICD-10-CM: E11.9 ICD-9-CM: 250.00 Hypercholesteremia     ICD-10-CM: E78.00 ICD-9-CM: 272.0 Essential hypertension     ICD-10-CM: I10 
ICD-9-CM: 401.9 Routine general medical examination at a health care facility     ICD-10-CM: Z00.00 ICD-9-CM: V70.0 Screening for alcoholism     ICD-10-CM: Z13.89 ICD-9-CM: V79.1 Vitals BP Pulse Temp Height(growth percentile) Weight(growth percentile) SpO2  
 (!) 156/100 98 97.2 °F (36.2 °C) (Oral) 5' 9\" (1.753 m) 209 lb 9.6 oz (95.1 kg) 91% BMI Smoking Status 30.95 kg/m2 Current Every Day Smoker Vitals History BMI and BSA Data Body Mass Index Body Surface Area 30.95 kg/m 2 2.15 m 2 Preferred Pharmacy Pharmacy Name Phone [facility] [phone]. [phone] Your Updated Medication List  
  
   
This list is accurate as of: 4/19/17 10:26 AM.  Always use your most recent med list.  
  
  
  
  
 albuterol 90 mcg/actuation inhaler Commonly known as:  PROVENTIL HFA, VENTOLIN HFA, PROAIR HFA Take 2 Puffs by inhalation every six (6) hours as needed for Wheezing. amLODIPine 10 mg tablet Commonly known as:  Delphina Peat Take 10 mg by mouth daily. aspirin 81 mg chewable tablet Take 1 Tab by mouth daily. carboxymethylcellulose sodium 0.5 % Drop ophthalmic solution Commonly known as:  REFRESH TEARS Administer 1 Drop to both eyes two (2) times a day. cetirizine 10 mg tablet Commonly known as:  ZYRTEC Take 1 Tab by mouth daily. cholecalciferol 1,000 unit tablet Commonly known as:  VITAMIN D3 Take 1,000 Units by mouth daily. clobetasol 0.05 % ointment Commonly known as:  Grant Bibber Apply  to affected area two (2) times a day. fluticasone 50 mcg/actuation nasal spray Commonly known as:  Sabiha Maricopa 2 Sprays by Both Nostrils route daily. fluticasone-salmeterol 250-50 mcg/dose diskus inhaler Commonly known as:  ADVAIR Take 1 Puff by inhalation every twelve (12) hours. gabapentin 300 mg capsule Commonly known as:  NEURONTIN Take 1 Cap by mouth four (4) times daily. glipiZIDE 10 mg tablet Commonly known as:  Michelle He Take 10 mg by mouth three (3) times daily. hydroCHLOROthiazide 25 mg tablet Commonly known as:  HYDRODIURIL Take 25 mg by mouth daily. losartan 50 mg tablet Commonly known as:  COZAAR Take  by mouth daily. metFORMIN 1,000 mg tablet Commonly known as:  GLUCOPHAGE Take 1,000 mg by mouth two (2) times daily (with meals). omega-3 fatty acids-vitamin e 1,000 mg Cap Commonly known as:  FISH OIL Take 1 Cap by mouth two (2) times a day. polyethylene glycol 17 gram/dose powder Commonly known as:  Artice Daunt Take 17 g by mouth daily. pravastatin 40 mg tablet Commonly known as:  PRAVACHOL Take 1.5 Tabs by mouth nightly. Take one-half tablet by mouth at bedtime  
  
 propranolol  mg SR capsule Commonly known as:  INDERAL LA Take 120 mg by mouth daily. PROTONIX 40 mg granules for oral suspension Generic drug:  pantoprazole 40 mg daily. traMADol 50 mg tablet Commonly known as:  ULTRAM  
Take 1 Tab by mouth every eight (8) hours as needed for Pain. Max Daily Amount: 150 mg.  
  
  
  
  
Prescriptions Printed Refills  
 traMADol (ULTRAM) 50 mg tablet 0 Sig: Take 1 Tab by mouth every eight (8) hours as needed for Pain. Max Daily Amount: 150 mg.  
 Class: Print Route: Oral  
 gabapentin (NEURONTIN) 300 mg capsule 1 Sig: Take 1 Cap by mouth four (4) times daily. Class: Print Route: Oral  
  
Follow-up Instructions Return in 3 months (on 7/19/2017). To-Do List   
 04/19/2017 Lab:  CBC WITH AUTOMATED DIFF   
  
 04/19/2017 Lab:  HEMOGLOBIN A1C WITH EAG   
  
 04/19/2017 Lab:  LIPID PANEL   
  
 04/19/2017 Lab:  METABOLIC PANEL, COMPREHENSIVE   
  
 04/19/2017 Lab:  MICROALBUMIN, UR, RAND W/ MICROALBUMIN/CREA RATIO Patient Instructions Medicare Wellness Visit, Male The best way to live healthy is to have a healthy lifestyle by eating a well-balanced diet, exercising regularly, limiting alcohol and stopping smoking. Regular physical exams and screening tests are another way to keep healthy. Preventive exams provided by your health care provider can find health problems before they become diseases or illnesses.  Preventive services including immunizations, screening tests, monitoring and exams can help you take care of your own health. All people over age 72 should have a pneumovax  and and a prevnar shot to prevent pneumonia. These are once in a lifetime unless you and your provider decide differently. All people over 65 should have a yearly flu shot and a tetanus vaccine every 10 years. Screening for diabetes mellitus with a blood sugar test should be done every year. Glaucoma is a disease of the eye due to increased ocular pressure that can lead to blindness and it should be done every year by an eye professional. 
 
Cardiovascular screening tests that check for elevated lipids (fatty part of blood) which can lead to heart disease and strokes should be done every 5 years. Colorectal screening that evaluates for blood or polyps in your colon should be done yearly as a stool test or every five years as a flexible sigmoidoscope or every 10 years as a colonoscopy up to age 76. Men up to age 76 may need a screening blood test for prostate cancer at certain intervals, depending on their personal and family history. This decision is between the patient and his provider. If you have been a smoker or had family history of abdominal aortic aneurysms, you and your provider may decide to schedule an ultrasound test of your aorta. Hepatitis C screening is also recommended for anyone born between [birth year] through [address]. A shingles vaccine is also recommended once in a lifetime after age 61. Your Medicare Wellness Exam is recommended annually. Here is a list of your current Health Maintenance items with a due date: 
Health Maintenance Due Topic Date Due Antonellakel Mooring Eye Exam  03/08/1970 Introducing [facility]! Dear Lien Ward: Thank you for requesting a Pencil You In account. Our records indicate that you already have an active Pencil You In account. You can access your account anytime at [email or web address] Did you know that you can access your hospital and ER discharge instructions at any time in Raptr? You can also review all of your test results from your hospital stay or ER visit. Additional Information If you have questions, please visit the Frequently Asked Questions section of the Raptr website at [email or web address] Remember, Raptr is NOT to be used for urgent needs. For medical emergencies, dial 911. Now available from your iPhone and Android! Please provide this summary of care documentation to your next provider. Your primary care clinician is listed as Jean Claude Kumar. If you have any questions after today's visit, please call [phone].

## 2017-07-05 RX ORDER — HYDROCHLOROTHIAZIDE 25 MG/1
25 TABLET ORAL DAILY
Status: CANCELLED | OUTPATIENT
Start: 2017-07-05

## 2017-07-05 NOTE — TELEPHONE ENCOUNTER
Dr. Mario Lancaster, none, pt has been out for medication has been out of it for month, advised pt that he needs an appt for this medication. Appt scheduled for 07/06/2017. This encounter will be closed.

## 2017-07-06 ENCOUNTER — OFFICE VISIT (OUTPATIENT)
Dept: FAMILY MEDICINE CLINIC | Age: 57
End: 2017-07-06

## 2017-07-06 VITALS
HEART RATE: 110 BPM | WEIGHT: 197.8 LBS | DIASTOLIC BLOOD PRESSURE: 84 MMHG | BODY MASS INDEX: 29.3 KG/M2 | HEIGHT: 69 IN | SYSTOLIC BLOOD PRESSURE: 151 MMHG | RESPIRATION RATE: 15 BRPM | OXYGEN SATURATION: 95 % | TEMPERATURE: 96.5 F

## 2017-07-06 DIAGNOSIS — I10 ESSENTIAL HYPERTENSION: ICD-10-CM

## 2017-07-06 DIAGNOSIS — J43.9 PULMONARY EMPHYSEMA, UNSPECIFIED EMPHYSEMA TYPE (HCC): ICD-10-CM

## 2017-07-06 DIAGNOSIS — E11.9 TYPE 2 DIABETES MELLITUS WITHOUT COMPLICATION, UNSPECIFIED LONG TERM INSULIN USE STATUS: Primary | ICD-10-CM

## 2017-07-06 RX ORDER — HYDROCHLOROTHIAZIDE 25 MG/1
25 TABLET ORAL DAILY
Qty: 90 TAB | Refills: 0 | Status: SHIPPED | OUTPATIENT
Start: 2017-07-06 | End: 2017-07-06 | Stop reason: SDUPTHER

## 2017-07-06 RX ORDER — PREDNISONE 10 MG/1
TABLET ORAL
Qty: 30 TAB | Refills: 0 | Status: SHIPPED | OUTPATIENT
Start: 2017-07-06 | End: 2018-05-08

## 2017-07-06 RX ORDER — HYDROCHLOROTHIAZIDE 25 MG/1
25 TABLET ORAL DAILY
Qty: 90 TAB | Refills: 0 | Status: SHIPPED | OUTPATIENT
Start: 2017-07-06 | End: 2020-01-20 | Stop reason: ALTCHOICE

## 2017-07-06 RX ORDER — HYDROCHLOROTHIAZIDE 25 MG/1
25 TABLET ORAL DAILY
Qty: 30 TAB | Refills: 0 | Status: SHIPPED | OUTPATIENT
Start: 2017-07-06 | End: 2017-07-06 | Stop reason: SDUPTHER

## 2017-07-06 NOTE — PROGRESS NOTES
History of Present Illness  Gerardo Hinson is a 56 y.o. male who presents today for management of    Chief Complaint   Patient presents with    Hypertension    Diabetes       Patient reports of increased thirst. He is diabetic on metformin. Home fasting blood sugar readings 110-160. He states that he has not been taking his metformin twice a day. And his glipizide he's been taking once instead of 3 times day. He ran out of his HCTZ 2 months ago. He takes his other blood pressure medications. Patient reports of productive cough and chest congestion. He is a chronic smoker. Past Medical History  Past Medical History:   Diagnosis Date    Back pain     [facility] [facility]     Diabetes ([facility] [address] [facility]     Hypercholesterolemia     Hypertension     Sleep apnea     Does not use CPAP        Surgical History  Past Surgical History:   Procedure Laterality Date    COLONOSCOPY N/A 1/23/2017    COLONOSCOPY performed by Yoav Dailey MD at [address] [place] [address]  2003    umbilacal     HX SHOULDER ARTHROSCOPY  2004    bilateral        Current Medications  Current Outpatient Prescriptions   Medication Sig    predniSONE (DELTASONE) 10 mg tablet 4 per day x 3 days, then 3 per day x 3 days, then 2 per day x 3 days, then 1 per day x 3 days, then DC    hydroCHLOROthiazide (HYDRODIURIL) 25 mg tablet Take 1 Tab by mouth daily.  traMADol (ULTRAM) 50 mg tablet Take 1 Tab by mouth every eight (8) hours as needed for Pain. Max Daily Amount: 150 mg.    gabapentin (NEURONTIN) 300 mg capsule Take 1 Cap by mouth four (4) times daily.  cetirizine (ZYRTEC) 10 mg tablet Take 1 Tab by mouth daily.  fluticasone-salmeterol (ADVAIR) 250-50 mcg/dose diskus inhaler Take 1 Puff by inhalation every twelve (12) hours.  pravastatin (PRAVACHOL) 40 mg tablet Take 1.5 Tabs by mouth nightly. Take one-half tablet by mouth at bedtime    losartan (COZAAR) 50 mg tablet Take  by mouth daily.     aspirin 81 mg chewable tablet Take 1 Tab by mouth daily.  carboxymethylcellulose sodium (REFRESH TEARS) 0.5 % drop ophthalmic solution Administer 1 Drop to both eyes two (2) times a day.  clobetasol (TEMOVATE) 0.05 % ointment Apply  to affected area two (2) times a day.  cholecalciferol (VITAMIN D3) 1,000 unit tablet Take 1,000 Units by mouth daily.  albuterol (PROVENTIL HFA, VENTOLIN HFA, PROAIR HFA) 90 mcg/actuation inhaler Take 2 Puffs by inhalation every six (6) hours as needed for Wheezing.  omega-3 fatty acids-vitamin e (FISH OIL) 1,000 mg cap Take 1 Cap by mouth two (2) times a day.  polyethylene glycol (MIRALAX) 17 gram/dose powder Take 17 g by mouth daily.  fluticasone (FLONASE) 50 mcg/actuation nasal spray 2 Sprays by Both Nostrils route daily.  metFORMIN (GLUCOPHAGE) 1,000 mg tablet Take 1,000 mg by mouth two (2) times daily (with meals).  pantoprazole (PROTONIX) 40 mg granules for oral suspension 40 mg daily.  propranolol LA (INDERAL LA) 120 mg SR capsule Take 120 mg by mouth daily.  glipiZIDE (GLUCOTROL) 10 mg tablet Take 10 mg by mouth three (3) times daily.  amLODIPine (NORVASC) 10 mg tablet Take 10 mg by mouth daily. No current facility-administered medications for this visit. Allergies/Drug Reactions  Allergies   Allergen Reactions    Lisinopril Swelling        Family History  Family History   Problem Relation Age of Onset   [facility] Cancer Mother     Hypertension Mother     Heart Disease Mother     Diabetes Mother     No Known Problems Father         Social History  Social History     Social History    Marital status:      Spouse name: N/A    Number of children: N/A    Years of education: N/A     Occupational History    Not on file.      Social History Main Topics    Smoking status: Current Every Day Smoker     Packs/day: 0.50     Years: 25.00     Types: Cigarettes    Smokeless tobacco: Former User     Quit date: 3/16/2016    Alcohol use 2.4 oz/week     0 Standard drinks or equivalent, 4 Shots of liquor per week    Drug use: No    Sexual activity: Yes     Partners: Female     Other Topics Concern    Not on file     Social History Narrative       Health Maintenance   Topic Date Due    EYE EXAM RETINAL OR DILATED Q1  03/08/1972    INFLUENZA AGE 9 TO ADULT  08/01/2017    HEMOGLOBIN A1C Q6M  10/19/2017    FOOT EXAM Q1  03/21/2018    MICROALBUMIN Q1  04/19/2018    LIPID PANEL Q1  04/19/2018    MEDICARE YEARLY EXAM  04/20/2018    COLONOSCOPY  01/23/2022    DTaP/Tdap/Td series (2 - Td) 09/17/2025    Hepatitis C Screening  Completed    Pneumococcal 19-64 Medium Risk  Completed     Immunization History   Administered Date(s) Administered    Influenza Vaccine 09/17/2015    Influenza Vaccine (Quad) PF 10/28/2016    Pneumococcal Polysaccharide (PPSV-23) 07/27/2016    Tdap 09/17/2015       Review of Systems  General ROS: negative for - chills, fatigue or fever  Respiratory ROS: positive for - cough, shortness of breath and wheezing  negative for - hemoptysis or pleuritic pain  Cardiovascular ROS: no chest pain or dyspnea on exertion  Musculoskeletal ROS: negative  Neurological ROS: no TIA or stroke symptoms    No exam data present    Physical Exam  Vital signs:   Vitals:    07/06/17 1041   BP: 151/84   Pulse: (!) 110   Resp: 15   Temp: 96.5 °F (35.8 °C)   TempSrc: Oral   SpO2: 95%   Weight: 197 lb 12.8 oz (89.7 kg)   Height: 5' 9\" (1.753 m)       General: alert, oriented, not in distress  Neck: supple, no JVD, no lymphadenopathy, non-palpable thyroid  Chest/Lungs: clear breath sounds,  Wheezing on the right, no crackles  Heart: normal rate, regular rhythm, no murmur  Abdomen: soft, non-distended, non-tender, normal bowel sounds, no organomegaly, no masses  Extremities: no focal deformities, no edema  Skin: no active skin lesions    Laboratory/Tests:  Component      Latest Ref Rng & Units 4/19/2017 4/19/2017 4/19/2017          10:33 AM 10:33 AM 10:33 AM   WBC      4.6 - 13.2 K/uL 8.5     RBC      4.70 - 5.50 M/uL 4.23 (L)     HGB      13.0 - 16.0 g/dL 13.9     HCT      36.0 - 48.0 % 41.9     MCV      74.0 - 97.0 FL 99.1 (H)     MCH      24.0 - 34.0 PG 32.9     MCHC      31.0 - 37.0 g/dL 33.2     RDW      11.6 - 14.5 % 13.4     PLATELET      922 - 211 K/uL 265     MPV      9.2 - 11.8 FL 11.3     NEUTROPHILS      40 - 73 % 51     LYMPHOCYTES      21 - 52 % 40     MONOCYTES      3 - 10 % 7     EOSINOPHILS      0 - 5 % 2     BASOPHILS      0 - 2 % 0     ABS. NEUTROPHILS      1.8 - 8.0 K/UL 4.5     ABS. LYMPHOCYTES      0.9 - 3.6 K/UL 3.5     ABS. MONOCYTES      0.05 - 1.2 K/UL 0.6     ABS. EOSINOPHILS      0.0 - 0.4 K/UL 0.2     ABS. BASOPHILS      0.0 - 0.06 K/UL 0.0     DF       AUTOMATED     Cholesterol, total      <200 MG/DL   201 (H)   Triglyceride      <150 MG/DL   367 (H)   HDL Cholesterol      40 - 60 MG/DL   45   LDL, calculated      0 - 100 MG/DL   82.6   VLDL, calculated      MG/DL   73.4   CHOL/HDL Ratio      0 - 5.0     4.5   Hemoglobin A1c, (calculated)      4.2 - 5.6 %  7.5 (H)    Est. average glucose      mg/dL  169      Component      Latest Ref Rng & Units 4/19/2017 4/19/2017           1:50 PM 10:33 AM   Sodium      136 - 145 mmol/L  139   Potassium      3.5 - 5.5 mmol/L  4.6   Chloride      100 - 108 mmol/L  97 (L)   CO2      21 - 32 mmol/L  34 (H)   Anion gap      3.0 - 18 mmol/L  8   Glucose      74 - 99 mg/dL  211 (H)   BUN      7.0 - 18 MG/DL  7   Creatinine      0.6 - 1.3 MG/DL  0.98   BUN/Creatinine ratio      12 - 20    7 (L)   GFR est AA      >60 ml/min/1.73m2  >60   GFR est non-AA      >60 ml/min/1.73m2  >60   Calcium      8.5 - 10.1 MG/DL  9.4   Bilirubin, total      0.2 - 1.0 MG/DL  0.3   ALT (SGPT)      16 - 61 U/L  26   AST      15 - 37 U/L  17   Alk.  phosphatase      45 - 117 U/L  67   Protein, total      6.4 - 8.2 g/dL  7.0   Albumin      3.4 - 5.0 g/dL  3.5   Globulin      2.0 - 4.0 g/dL  3.5   A-G Ratio      0.8 - 1.7    1.0 Microalbumin,urine random      0 - 3.0 MG/.30 (H)    Creatinine, urine      30 - 125 mg/dL 275.53 (H)    Microalbumin/Creat. Ratio      0 - 30 mg/g 477 (H)      Assessment/Plan:      1. Type 2 diabetes mellitus without complication, unspecified long term insulin use status  - poorly controlled due to poor compliance to medications  - continue metformin and glipizide  - stressed importance of medication compliance      2. Essential hypertension  - BP needs better control - ran out of HCTZ 2 months ago  - restart hydroCHLOROthiazide (HYDRODIURIL) 25 mg tablet; Take 1 Tab by mouth daily. Dispense: 30 Tab; Refill: 0  - continue other meds    3. Pulmonary emphysema, unspecified emphysema type ([address]  - continue Advair and prn albuterol  - smoking cessation  - predniSONE (DELTASONE) 10 mg tablet; 4 per day x 3 days, then 3 per day x 3 days, then 2 per day x 3 days, then 1 per day x 3 days, then DC  Dispense: 30 Tab; Refill: 0    ROV with PCP on 7/19/17    Follow-up Disposition:  Return if symptoms worsen or fail to improve. I have discussed the diagnosis with the patient and the intended plan as seen in the above orders. The patient has received an after-visit summary and questions were answered concerning future plans. I have discussed medication side effects and warnings with the patient as well. I have reviewed the plan of care with the patient, accepted their input and they are in agreement with the treatment goals.        Prieto Acevedo MD  July 6, 2017

## 2017-07-06 NOTE — MR AVS SNAPSHOT
Visit Information Date & Time Provider Department Dept. Phone Encounter #  
 7/6/2017  4:45 PM Vince Scheuermann, [address] [facility] [phone] [id number] Follow-up Instructions Return if symptoms worsen or fail to improve. Your Appointments 7/6/2017  4:45 PM  
Office [place]  
[address] [address]) Appt Note: med refill, hctz  
 [address] [facility] [address] [place]  
  
   
 [address] 7/19/2017  9:30 AM  
Follow Up with Kylee Ellison MD  
[address]) Appt Note: Return in 3 months (7/19/17). [address] [facility] [address] [place]  
  
   
 [id number] [place] [address] [address] Upcoming Health Maintenance Date Due  
 EYE EXAM RETINAL OR DILATED Q1 3[birth year]8/1970 INFLUENZA AGE 9 TO ADULT 8/1/2017 HEMOGLOBIN A1C Q6M 10/19/2017 FOOT EXAM Q1 3/21/2018 MICROALBUMIN Q1 4/19/2018 LIPID PANEL Q1 4/19/2018 MEDICARE YEARLY EXAM 4/20/2018 COLONOSCOPY 1/23/2022 DTaP/Tdap/Td series (2 - Td) 9/17/2025 Allergies as of 7/6/2017  Review Complete On: 7/6/2017 By: Marie Ibarra LPN Severity Noted Reaction Type Reactions Lisinopril  03/01/2016    Swelling Current Immunizations  Never Reviewed Name Date Influenza Vaccine 9/17/2015  9:38 AM  
 Influenza Vaccine (Quad) PF 10/28/2016 Pneumococcal Polysaccharide (PPSV-23) 7/27/2016 Tdap 9/17/2015  9:39 AM  
  
 Not reviewed this visit You Were Diagnosed With   
  
 Codes Comments Type 2 diabetes mellitus without complication, unspecified long term insulin use status    -  Primary ICD-10-CM: E11.9 ICD-9-CM: 250.00 Essential hypertension     ICD-10-CM: I10 
ICD-9-CM: 401.9 Pulmonary emphysema, unspecified emphysema type ([facility]     ICD-10-CM: J43.9 ICD-9-CM: 492.8 Vitals BP Pulse Temp Resp Height(growth percentile) Weight(growth percentile) 151/84 (BP 1 Location: Right arm, BP Patient Position: Sitting) (!) 110 96.5 °F (35.8 °C) (Oral) 15 5' 9\" (1.753 m) 197 lb 12.8 oz (89.7 kg) SpO2 BMI Smoking Status 95% 29.21 kg/m2 Current Every Day Smoker Vitals History BMI and BSA Data Body Mass Index Body Surface Area  
 29.21 kg/m 2 2.09 m 2 Preferred Pharmacy Pharmacy Name Phone [facility] [address] [place] [address] [phone] Your Updated Medication List  
  
   
This list is accurate as of: 7/6/17 11:18 AM.  Always use your most recent med list.  
  
  
  
  
 albuterol 90 mcg/actuation inhaler Commonly known as:  PROVENTIL HFA, VENTOLIN HFA, PROAIR HFA Take 2 Puffs by inhalation every six (6) hours as needed for Wheezing. amLODIPine 10 mg tablet Commonly known as:  Skip Newcomer Take 10 mg by mouth daily. aspirin 81 mg chewable tablet Take 1 Tab by mouth daily. carboxymethylcellulose sodium 0.5 % Drop ophthalmic solution Commonly known as:  REFRESH TEARS Administer 1 Drop to both eyes two (2) times a day. cetirizine 10 mg tablet Commonly known as:  ZYRTEC Take 1 Tab by mouth daily. cholecalciferol 1,000 unit tablet Commonly known as:  VITAMIN D3 Take 1,000 Units by mouth daily. clobetasol 0.05 % ointment Commonly known as:  Julien Llanos Apply  to affected area two (2) times a day. fluticasone 50 mcg/actuation nasal spray Commonly known as:  [place] 2 Sprays by Both Nostrils route daily. fluticasone-salmeterol 250-50 mcg/dose diskus inhaler Commonly known as:  ADVAIR Take 1 Puff by inhalation every twelve (12) hours. gabapentin 300 mg capsule Commonly known as:  NEURONTIN Take 1 Cap by mouth four (4) times daily. glipiZIDE 10 mg tablet Commonly known as:  Narvis Fiddler Take 10 mg by mouth three (3) times daily. hydroCHLOROthiazide 25 mg tablet Commonly known as:  HYDRODIURIL Take 1 Tab by mouth daily. losartan 50 mg tablet Commonly known as:  COZAAR Take  by mouth daily. metFORMIN 1,000 mg tablet Commonly known as:  GLUCOPHAGE Take 1,000 mg by mouth two (2) times daily (with meals). omega-3 fatty acids-vitamin e 1,000 mg Cap Commonly known as:  FISH OIL Take 1 Cap by mouth two (2) times a day. polyethylene glycol 17 gram/dose powder Commonly known as:  [place] Take 17 g by mouth daily. pravastatin 40 mg tablet Commonly known as:  PRAVACHOL Take 1.5 Tabs by mouth nightly. Take one-half tablet by mouth at bedtime  
  
 predniSONE 10 mg tablet Commonly known as:  DELTASONE  
4 per day x 3 days, then 3 per day x 3 days, then 2 per day x 3 days, then 1 per day x 3 days, then DC  
  
 propranolol  mg SR capsule Commonly known as:  INDERAL LA Take 120 mg by mouth daily. PROTONIX 40 mg granules for oral suspension Generic drug:  pantoprazole 40 mg daily. traMADol 50 mg tablet Commonly known as:  ULTRAM  
Take 1 Tab by mouth every eight (8) hours as needed for Pain. Max Daily Amount: 150 mg.  
  
  
  
  
Prescriptions Printed Refills  
 hydroCHLOROthiazide (HYDRODIURIL) 25 mg tablet 0 Sig: Take 1 Tab by mouth daily. Class: Print Route: Oral  
 predniSONE (DELTASONE) 10 mg tablet 0 Si per day x 3 days, then 3 per day x 3 days, then 2 per day x 3 days, then 1 per day x 3 days, then DC Class: Print Follow-up Instructions Return if symptoms worsen or fail to improve. Introducing [facility]! Dear Debi Andrade: Thank you for requesting a Smile account. Our records indicate that you already have an active Smile account. You can access your account anytime at [email or web address] [employer] Did you know that you can access your hospital and ER discharge instructions at any time in wishkicker? You can also review all of your test results from your hospital stay or ER visit. Additional Information If you have questions, please visit the Frequently Asked Questions section of the wishkicker website at [email or web address] Remember, wishkicker is NOT to be used for urgent needs. For medical emergencies, dial 911. Now available from your iPhone and Android! Please provide this summary of care documentation to your next provider. Your primary care clinician is listed as [id number] Cantu. If you have any questions after today's visit, please call [phone].

## 2017-07-10 ENCOUNTER — TELEPHONE (OUTPATIENT)
Dept: FAMILY MEDICINE CLINIC | Age: 57
End: 2017-07-10

## 2017-07-10 NOTE — TELEPHONE ENCOUNTER
Pt called, stating that his BS is reading [address]. Patient explained that he is beginning to take medication as prescribed/and that he is concerned. Explained to pt that if he begins to fill nauseated, constantly going to the bathroom, and sweating profusely to go to the ER immediatly. Advised patient to continue to check BS and to call clinic tomorrow to give BS reading. This encounter will be closed.

## 2017-07-19 ENCOUNTER — HOSPITAL ENCOUNTER (OUTPATIENT)
Dept: LAB | Age: 57
Discharge: HOME | End: 2017-07-19
Payer: MEDICARE

## 2017-07-19 ENCOUNTER — OFFICE VISIT (OUTPATIENT)
Dept: FAMILY MEDICINE CLINIC | Age: 57
End: 2017-07-19

## 2017-07-19 VITALS
TEMPERATURE: 97.3 F | WEIGHT: 197.8 LBS | SYSTOLIC BLOOD PRESSURE: 114 MMHG | BODY MASS INDEX: 29.3 KG/M2 | HEIGHT: 69 IN | DIASTOLIC BLOOD PRESSURE: 82 MMHG | RESPIRATION RATE: 16 BRPM | HEART RATE: 91 BPM | OXYGEN SATURATION: 97 %

## 2017-07-19 DIAGNOSIS — B37.83 PERLECHE WITH CANDIDIASIS: ICD-10-CM

## 2017-07-19 DIAGNOSIS — E78.00 HYPERCHOLESTEREMIA: ICD-10-CM

## 2017-07-19 DIAGNOSIS — E11.9 TYPE 2 DIABETES MELLITUS WITHOUT COMPLICATION, WITHOUT LONG-TERM CURRENT USE OF INSULIN (HCC): Primary | ICD-10-CM

## 2017-07-19 DIAGNOSIS — E11.9 TYPE 2 DIABETES MELLITUS WITHOUT COMPLICATION, WITHOUT LONG-TERM CURRENT USE OF INSULIN (HCC): ICD-10-CM

## 2017-07-19 DIAGNOSIS — I10 ESSENTIAL HYPERTENSION: ICD-10-CM

## 2017-07-19 LAB
ALBUMIN SERPL BCP-MCNC: 3.6 G/DL (ref 3.4–5)
ALBUMIN/GLOB SERPL: 1 {RATIO} (ref 0.8–1.7)
ALP SERPL-CCNC: 74 U/L (ref 45–117)
ALT SERPL-CCNC: 20 U/L (ref 16–61)
ANION GAP BLD CALC-SCNC: 7 MMOL/L (ref 3–18)
AST SERPL W P-5'-P-CCNC: 9 U/L (ref 15–37)
BILIRUB SERPL-MCNC: 0.3 MG/DL (ref 0.2–1)
BUN SERPL-MCNC: 16 MG/DL (ref 7–18)
BUN/CREAT SERPL: 15 (ref 12–20)
CALCIUM SERPL-MCNC: 9.6 MG/DL (ref 8.5–10.1)
CHLORIDE SERPL-SCNC: 95 MMOL/L (ref 100–108)
CHOLEST SERPL-MCNC: 200 MG/DL
CO2 SERPL-SCNC: 32 MMOL/L (ref 21–32)
CREAT SERPL-MCNC: 1.05 MG/DL (ref 0.6–1.3)
EST. AVERAGE GLUCOSE BLD GHB EST-MCNC: 315 MG/DL
GLOBULIN SER CALC-MCNC: 3.7 G/DL (ref 2–4)
GLUCOSE SERPL-MCNC: 325 MG/DL (ref 74–99)
HBA1C MFR BLD: 12.6 % (ref 4.2–5.6)
HDLC SERPL-MCNC: 65 MG/DL (ref 40–60)
HDLC SERPL: 3.1 {RATIO} (ref 0–5)
LDLC SERPL CALC-MCNC: 67.6 MG/DL (ref 0–100)
LIPID PROFILE,FLP: ABNORMAL
POTASSIUM SERPL-SCNC: 4.7 MMOL/L (ref 3.5–5.5)
PROT SERPL-MCNC: 7.3 G/DL (ref 6.4–8.2)
SODIUM SERPL-SCNC: 134 MMOL/L (ref 136–145)
TRIGL SERPL-MCNC: 337 MG/DL (ref ?–150)
VLDLC SERPL CALC-MCNC: 67.4 MG/DL

## 2017-07-19 PROCEDURE — 80053 COMPREHEN METABOLIC PANEL: CPT | Performed by: INTERNAL MEDICINE

## 2017-07-19 PROCEDURE — 83036 HEMOGLOBIN GLYCOSYLATED A1C: CPT | Performed by: INTERNAL MEDICINE

## 2017-07-19 PROCEDURE — 36415 COLL VENOUS BLD VENIPUNCTURE: CPT | Performed by: INTERNAL MEDICINE

## 2017-07-19 PROCEDURE — 80061 LIPID PANEL: CPT | Performed by: INTERNAL MEDICINE

## 2017-07-19 RX ORDER — CHLORPHENIRAMINE MALEATE 4 MG
TABLET ORAL 2 TIMES DAILY
Qty: 35 G | Refills: 1 | Status: SHIPPED | OUTPATIENT
Start: 2017-07-19 | End: 2019-04-23

## 2017-07-19 RX ORDER — GUAIFENESIN 100 MG/5ML
81 LIQUID (ML) ORAL DAILY
Qty: 90 TAB | Refills: 1 | Status: SHIPPED | OUTPATIENT
Start: 2017-07-19 | End: 2017-08-28 | Stop reason: SDUPTHER

## 2017-07-19 RX ORDER — CHLORPHENIRAMINE MALEATE 4 MG
TABLET ORAL 2 TIMES DAILY
Qty: 35 G | Refills: 1 | Status: SHIPPED | OUTPATIENT
Start: 2017-07-19 | End: 2017-07-19 | Stop reason: SDUPTHER

## 2017-07-19 NOTE — PROGRESS NOTES
1. Have you been to the ER, urgent care clinic since your last visit? Hospitalized since your last visit? No    2. Have you seen or consulted any other health care providers outside of the [address] [place] [address] since your last visit? Include any pap smears or colon screening.  No

## 2017-07-19 NOTE — MR AVS SNAPSHOT
Visit Information Date & Time Provider Department Dept. Phone Encounter #  
 7/19/2017  9:30 AM [id number], [address] [facility] [phone] Follow-up Instructions Return in about 1 week (around 7/26/2017). Upcoming Health Maintenance Date Due  
 EYE EXAM RETINAL OR DILATED Q1 3/8/1972 INFLUENZA AGE 9 TO ADULT 8/1/2017 HEMOGLOBIN A1C Q6M 10/19/2017 FOOT EXAM Q1 3/21/2018 MICROALBUMIN Q1 4/19/2018 LIPID PANEL Q1 4/19/2018 MEDICARE YEARLY EXAM 4/20/2018 COLONOSCOPY 1/23/2022 DTaP/Tdap/Td series (2 - Td) 9/17/2025 Allergies as of 7/19/2017  Review Complete On: 7/19/2017 By: Herminia Cantu MD  
  
 Severity Noted Reaction Type Reactions Lisinopril  03/01/2016    Swelling Current Immunizations  Never Reviewed Name Date Influenza Vaccine 9/17/2015  9:38 AM  
 Influenza Vaccine (Quad) PF 10/28/2016 Pneumococcal Polysaccharide (PPSV-23) 7/27/2016 Tdap 9/17/2015  9:39 AM  
  
 Not reviewed this visit You Were Diagnosed With   
  
 Codes Comments Type 2 diabetes mellitus without complication, without long-term current use of insulin (HCC)    -  Primary ICD-10-CM: E11.9 ICD-9-CM: 250.00 Essential hypertension     ICD-10-CM: I10 
ICD-9-CM: 401.9 Hypercholesteremia     ICD-10-CM: E78.00 ICD-9-CM: 272.0 Vitals BP Pulse Temp Resp Height(growth percentile) Weight(growth percentile) 114/82 (BP 1 Location: Left arm, BP Patient Position: Sitting) 91 97.3 °F (36.3 °C) (Oral) 16 5' 9\" (1.753 m) 197 lb 12.8 oz (89.7 kg) SpO2 BMI Smoking Status 97% 29.21 kg/m2 Current Every Day Smoker Vitals History BMI and BSA Data Body Mass Index Body Surface Area  
 29.21 kg/m 2 2.09 m 2 Preferred Pharmacy Pharmacy Name Phone [facility], [address] [phone] Your Updated Medication List  
  
   
 This list is accurate as of: 7/19/17  9:49 AM.  Always use your most recent med list.  
  
  
  
  
 albuterol 90 mcg/actuation inhaler Commonly known as:  PROVENTIL HFA, VENTOLIN HFA, PROAIR HFA Take 2 Puffs by inhalation every six (6) hours as needed for Wheezing. amLODIPine 10 mg tablet Commonly known as:  [place] Take 10 mg by mouth daily. aspirin 81 mg chewable tablet Take 1 Tab by mouth daily. carboxymethylcellulose sodium 0.5 % Drop ophthalmic solution Commonly known as:  REFRESH TEARS Administer 1 Drop to both eyes two (2) times a day. cetirizine 10 mg tablet Commonly known as:  ZYRTEC Take 1 Tab by mouth daily. cholecalciferol 1,000 unit tablet Commonly known as:  VITAMIN D3 Take 1,000 Units by mouth daily. clobetasol 0.05 % ointment Commonly known as:  Anice Plough Apply  to affected area two (2) times a day. fluticasone 50 mcg/actuation nasal spray Commonly known as:  Nina Coffee 2 Sprays by Both Nostrils route daily. fluticasone-salmeterol 250-50 mcg/dose diskus inhaler Commonly known as:  ADVAIR Take 1 Puff by inhalation every twelve (12) hours. gabapentin 300 mg capsule Commonly known as:  NEURONTIN Take 1 Cap by mouth four (4) times daily. glipiZIDE 10 mg tablet Commonly known as:  Rodgers Perish Take 10 mg by mouth three (3) times daily. hydroCHLOROthiazide 25 mg tablet Commonly known as:  HYDRODIURIL Take 1 Tab by mouth daily. losartan 50 mg tablet Commonly known as:  COZAAR Take  by mouth daily. metFORMIN 1,000 mg tablet Commonly known as:  GLUCOPHAGE Take 1,000 mg by mouth two (2) times daily (with meals). omega-3 fatty acids-vitamin e 1,000 mg Cap Commonly known as:  FISH OIL Take 1 Cap by mouth two (2) times a day. polyethylene glycol 17 gram/dose powder Commonly known as:  Claudeen Cast Take 17 g by mouth daily. pravastatin 40 mg tablet Commonly known as:  PRAVACHOL Take 1.5 Tabs by mouth nightly. Take one-half tablet by mouth at bedtime  
  
 predniSONE 10 mg tablet Commonly known as:  DELTASONE  
4 per day x 3 days, then 3 per day x 3 days, then 2 per day x 3 days, then 1 per day x 3 days, then DC  
  
 propranolol  mg SR capsule Commonly known as:  INDERAL LA Take 120 mg by mouth daily. PROTONIX 40 mg granules for oral suspension Generic drug:  pantoprazole 40 mg daily. traMADol 50 mg tablet Commonly known as:  ULTRAM  
Take 1 Tab by mouth every eight (8) hours as needed for Pain. Max Daily Amount: 150 mg.  
  
  
  
  
Prescriptions Printed Refills  
 aspirin 81 mg chewable tablet 1 Sig: Take 1 Tab by mouth daily. Class: Print Route: Oral  
  
Follow-up Instructions Return in about 1 week (around 7/26/2017). To-Do List   
 07/19/2017 Lab:  HEMOGLOBIN A1C WITH EAG   
  
 07/19/2017 Lab:  LIPID PANEL   
  
 07/19/2017 Lab:  METABOLIC PANEL, COMPREHENSIVE [facility] & HEALTH SERVICES! Dear Chen Ching: Thank you for requesting a Qype account. Our records indicate that you already have an active Qype account. You can access your account anytime at [email or web address] Did you know that you can access your hospital and ER discharge instructions at any time in Qype? You can also review all of your test results from your hospital stay or ER visit. Additional Information If you have questions, please visit the Frequently Asked Questions section of the Qype website at [email or web address] Remember, Qype is NOT to be used for urgent needs. For medical emergencies, dial 911. Now available from your iPhone and Android! Please provide this summary of care documentation to your next provider. Your primary care clinician is listed as Navin Jack.  If you have any questions after today's visit, please call [phone].

## 2017-07-19 NOTE — PROGRESS NOTES
Joshua Peacock is a 56 y.o.  male and presents with     Chief Complaint   Patient presents with    Diabetes    Hypertension    Cholesterol Problem     Pt has been  Having increased thirst and nocturia. Pt has been monitoring his sugars and ist has been in the rnage of 250 -300. Pt recently saw Dr ORTIZ for cough and was given prednisone and that has improved the cough. Past Medical History:   Diagnosis Date    Back pain     Chronic obstructive pulmonary disease ([address]     Diabetes ([address]     Hypercholesterolemia     Hypertension     Sleep apnea     Does not use CPAP     Past Surgical History:   Procedure Laterality Date    COLONOSCOPY N/A 1/23/2017    COLONOSCOPY performed by Lakhwinder Wilks MD at [address] [place] [address] St  2003    umbilacal     HX SHOULDER ARTHROSCOPY  2004    bilateral     Current Outpatient Prescriptions   Medication Sig    aspirin 81 mg chewable tablet Take 1 Tab by mouth daily.  predniSONE (DELTASONE) 10 mg tablet 4 per day x 3 days, then 3 per day x 3 days, then 2 per day x 3 days, then 1 per day x 3 days, then DC    hydroCHLOROthiazide (HYDRODIURIL) 25 mg tablet Take 1 Tab by mouth daily.  traMADol (ULTRAM) 50 mg tablet Take 1 Tab by mouth every eight (8) hours as needed for Pain. Max Daily Amount: 150 mg.    gabapentin (NEURONTIN) 300 mg capsule Take 1 Cap by mouth four (4) times daily.  cetirizine (ZYRTEC) 10 mg tablet Take 1 Tab by mouth daily.  fluticasone-salmeterol (ADVAIR) 250-50 mcg/dose diskus inhaler Take 1 Puff by inhalation every twelve (12) hours.  pravastatin (PRAVACHOL) 40 mg tablet Take 1.5 Tabs by mouth nightly. Take one-half tablet by mouth at bedtime    losartan (COZAAR) 50 mg tablet Take  by mouth daily.  carboxymethylcellulose sodium (REFRESH TEARS) 0.5 % drop ophthalmic solution Administer 1 Drop to both eyes two (2) times a day.  clobetasol (TEMOVATE) 0.05 % ointment Apply  to affected area two (2) times a day.  cholecalciferol (VITAMIN D3) 1,000 unit tablet Take 1,000 Units by mouth daily.  albuterol (PROVENTIL HFA, VENTOLIN HFA, PROAIR HFA) 90 mcg/actuation inhaler Take 2 Puffs by inhalation every six (6) hours as needed for Wheezing.  omega-3 fatty acids-vitamin e (FISH OIL) 1,000 mg cap Take 1 Cap by mouth two (2) times a day.  polyethylene glycol (MIRALAX) 17 gram/dose powder Take 17 g by mouth daily.  fluticasone (FLONASE) 50 mcg/actuation nasal spray 2 Sprays by Both Nostrils route daily.  metFORMIN (GLUCOPHAGE) 1,000 mg tablet Take 1,000 mg by mouth two (2) times daily (with meals).  pantoprazole (PROTONIX) 40 mg granules for oral suspension 40 mg daily.  propranolol LA (INDERAL LA) 120 mg SR capsule Take 120 mg by mouth daily.  glipiZIDE (GLUCOTROL) 10 mg tablet Take 10 mg by mouth three (3) times daily.  amLODIPine (NORVASC) 10 mg tablet Take 10 mg by mouth daily. No current facility-administered medications for this visit. Health Maintenance   Topic Date Due    EYE EXAM RETINAL OR DILATED Q1  03/08/1972    INFLUENZA AGE 9 TO ADULT  08/01/2017    HEMOGLOBIN A1C Q6M  10/19/2017    FOOT EXAM Q1  03/21/2018    MICROALBUMIN Q1  04/19/2018    LIPID PANEL Q1  04/19/2018    MEDICARE YEARLY EXAM  04/20/2018    COLONOSCOPY  01/23/2022    DTaP/Tdap/Td series (2 - Td) 09/17/2025    Hepatitis C Screening  Completed    Pneumococcal 19-64 Medium Risk  Completed     Immunization History   Administered Date(s) Administered    Influenza Vaccine 09/17/2015    Influenza Vaccine (Quad) PF 10/28/2016    Pneumococcal Polysaccharide (PPSV-23) 07/27/2016    Tdap 09/17/2015     No LMP for male patient. Allergies and Intolerances:    Allergies   Allergen Reactions    Lisinopril Swelling       Family History:   Family History   Problem Relation Age of Onset   [facility] Cancer Mother     Hypertension Mother     Heart Disease Mother     Diabetes Mother     No Known Problems Father Social History:   He  reports that he has been smoking Cigarettes. He has a 12.50 pack-year smoking history. He quit smokeless tobacco use about 16 months ago. He  reports that he drinks about 2.4 oz of alcohol per week             Review of Systems: increased thrist  General: negative for - chills, fatigue, fever,  Pos for weight change  Psych: negative for - anxiety, depression, irritability or mood swings  ENT: negative for - headaches, hearing change, nasal congestion, oral lesions, sneezing or sore throat  Heme/ Lymph: negative for - bleeding problems, bruising, pallor or swollen lymph nodes  Endo: negative for - hot flashes, polydipsia/polyuria or temperature intolerance  Resp: negative for - cough, shortness of breath or wheezing  CV: negative for - chest pain, edema or palpitations  GI: negative for - abdominal pain, change in bowel habits, constipation, diarrhea or nausea/vomiting  : negative for - dysuria, hematuria, incontinence, pelvic pain or vulvar/vaginal symptoms  MSK: negative for - joint pain, joint swelling or muscle pain  Neuro: negative for - confusion, headaches, seizures or weakness  Derm: negative for - dry skin, hair changes, rash or skin lesion changes          Physical:   Vitals:   Vitals:    07/19/17 0928   BP: 114/82   Pulse: 91   Resp: 16   Temp: 97.3 °F (36.3 °C)   TempSrc: Oral   SpO2: 97%   Weight: 197 lb 12.8 oz (89.7 kg)   Height: 5' 9\" (1.753 m)           Exam:   HEENT- atraumatic,normocephalic, awake, oriented, well nourished  Neck - supple,no enlarged lymph nodes, no JVD, no thyromegaly  Chest- CTA, no rhonchi, no crackles  Heart- rrr, no murmurs / gallop/rub  Abdomen- soft,BS+,NT, no hepatosplenomegaly  Ext - no c/c/edema   Neuro- no focal deficits. Power 5/5 all extremities  Skin - warm,dry, no obvious rashes.           Review of Data:   LABS:   Lab Results   Component Value Date/Time    WBC 8.5 04/19/2017 10:33 AM    HGB 13.9 04/19/2017 10:33 AM    HCT 41.9 04/19/2017 10:33 AM    PLATELET 503 68/02/8878 10:33 AM     Lab Results   Component Value Date/Time    Sodium 139 04/19/2017 10:33 AM    Potassium 4.6 04/19/2017 10:33 AM    Chloride 97 04/19/2017 10:33 AM    CO2 34 04/19/2017 10:33 AM    Glucose 211 04/19/2017 10:33 AM    BUN 7 04/19/2017 10:33 AM    Creatinine 0.98 04/19/2017 10:33 AM     Lab Results   Component Value Date/Time    Cholesterol, total 201 04/19/2017 10:33 AM    HDL Cholesterol 45 04/19/2017 10:33 AM    LDL, calculated 82.6 04/19/2017 10:33 AM    Triglyceride 367 04/19/2017 10:33 AM     No results found for: GPT        Impression / Plan:        ICD-10-CM ICD-9-CM    1. Type 2 diabetes mellitus without complication, without long-term current use of insulin (HCC) E11.9 250.00 HEMOGLOBIN A1C WITH EAG      METABOLIC PANEL, COMPREHENSIVE   2. Essential hypertension I10 401.9    3. Hypercholesteremia E78.00 272.0 LIPID PANEL     May need to start insulin. Low carb diet. Stop soda. Bring all meds next visit. Pt already got [facility] the [facility] last month. Was told his eyes are good from DM standpoint. Explained to patient risk benefits of the medications. Advised patient to stop meds if having any side effects. Pt verbalized understanding of the instructions. I have discussed the diagnosis with the patient and the intended plan as seen in the above orders. The patient has received an after-visit summary and questions were answered concerning future plans. I have discussed medication side effects and warnings with the patient as well. I have reviewed the plan of care with the patient, accepted their input and they are in agreement with the treatment goals. Reviewed plan of care. Patient has provided input and agrees with goals.     Follow-up Disposition: Not on Fadia Sequeira MD

## 2017-07-26 ENCOUNTER — OFFICE VISIT (OUTPATIENT)
Dept: FAMILY MEDICINE CLINIC | Age: 57
End: 2017-07-26

## 2017-07-26 VITALS
HEIGHT: 69 IN | DIASTOLIC BLOOD PRESSURE: 82 MMHG | BODY MASS INDEX: 29.62 KG/M2 | SYSTOLIC BLOOD PRESSURE: 129 MMHG | HEART RATE: 90 BPM | RESPIRATION RATE: 15 BRPM | WEIGHT: 200 LBS | OXYGEN SATURATION: 92 % | TEMPERATURE: 97.5 F

## 2017-07-26 DIAGNOSIS — E11.9 TYPE 2 DIABETES MELLITUS WITHOUT COMPLICATION, WITHOUT LONG-TERM CURRENT USE OF INSULIN (HCC): Primary | ICD-10-CM

## 2017-07-26 NOTE — MR AVS SNAPSHOT
Visit Information Date & Time Provider Department Dept. Phone Encounter #  
 7/26/2017  9:30 AM Nithya Francois, [address] [facility] [phone] [id number] Follow-up Instructions Return in about 2 months (around 9/26/2017). Upcoming Health Maintenance Date Due  
 EYE EXAM RETINAL OR DILATED Q1 3/8/1972 INFLUENZA AGE 9 TO ADULT 8/1/2017 HEMOGLOBIN A1C Q6M 1/19/2018 FOOT EXAM Q1 3/21/2018 MICROALBUMIN Q1 4/19/2018 MEDICARE YEARLY EXAM 4/20/2018 LIPID PANEL Q1 7/19/2018 COLONOSCOPY 1/23/2022 DTaP/Tdap/Td series (2 - Td) 9/17/2025 Allergies as of 7/26/2017  Review Complete On: 7/26/2017 By: Nithya Francois MD  
  
 Severity Noted Reaction Type Reactions Lisinopril  03/01/2016    Swelling Current Immunizations  Never Reviewed Name Date Influenza Vaccine 9/17/2015  9:38 AM  
 Influenza Vaccine (Quad) PF 10/28/2016 Pneumococcal Polysaccharide (PPSV-23) 7/27/2016 Tdap 9/17/2015  9:39 AM  
  
 Not reviewed this visit You Were Diagnosed With   
  
 Codes Comments Type 2 diabetes mellitus without complication, without long-term current use of insulin (HCC)    -  Primary ICD-10-CM: E11.9 ICD-9-CM: 250.00 Vitals BP Pulse Temp Resp Height(growth percentile) Weight(growth percentile) 129/82 90 97.5 °F (36.4 °C) (Oral) 15 5' 9\" (1.753 m) 200 lb (90.7 kg) SpO2 BMI Smoking Status 92% 29.53 kg/m2 Current Every Day Smoker Vitals History BMI and BSA Data Body Mass Index Body Surface Area  
 29.53 kg/m 2 2.1 m 2 Preferred Pharmacy Pharmacy Name Phone [facility], [address] [phone] Your Updated Medication List  
  
   
This list is accurate as of: 7/26/17 10:14 AM.  Always use your most recent med list.  
  
  
  
  
 albuterol 90 mcg/actuation inhaler Commonly known as:  PROVENTIL HFA, VENTOLIN HFA, PROAIR HFA  
 Take 2 Puffs by inhalation every six (6) hours as needed for Wheezing. amLODIPine 10 mg tablet Commonly known as:  Ererosemaryt Rashaun Take 10 mg by mouth daily. aspirin 81 mg chewable tablet Take 1 Tab by mouth daily. carboxymethylcellulose sodium 0.5 % Drop ophthalmic solution Commonly known as:  REFRESH TEARS Administer 1 Drop to both eyes two (2) times a day. cetirizine 10 mg tablet Commonly known as:  ZYRTEC Take 1 Tab by mouth daily. cholecalciferol 1,000 unit tablet Commonly known as:  VITAMIN D3 Take 1,000 Units by mouth daily. clobetasol 0.05 % ointment Commonly known as:  Warnell January Apply  to affected area two (2) times a day. clotrimazole 1 % topical cream  
Commonly known as:  Margreta Tasha Apply  to affected area two (2) times a day. fluticasone 50 mcg/actuation nasal spray Commonly known as:  Clyman Galla 2 Sprays by Both Nostrils route daily. fluticasone-salmeterol 250-50 mcg/dose diskus inhaler Commonly known as:  ADVAIR Take 1 Puff by inhalation every twelve (12) hours. gabapentin 300 mg capsule Commonly known as:  NEURONTIN Take 1 Cap by mouth four (4) times daily. glipiZIDE 10 mg tablet Commonly known as:  Connor Faizan Take 10 mg by mouth three (3) times daily. hydroCHLOROthiazide 25 mg tablet Commonly known as:  HYDRODIURIL Take 1 Tab by mouth daily. losartan 50 mg tablet Commonly known as:  COZAAR Take  by mouth daily. metFORMIN 1,000 mg tablet Commonly known as:  GLUCOPHAGE Take 1,000 mg by mouth two (2) times daily (with meals). omega-3 fatty acids-vitamin e 1,000 mg Cap Commonly known as:  FISH OIL Take 1 Cap by mouth two (2) times a day. polyethylene glycol 17 gram/dose powder Commonly known as:  Arminda Mule Take 17 g by mouth daily. pravastatin 40 mg tablet Commonly known as:  PRAVACHOL  
 Take 1.5 Tabs by mouth nightly. Take one-half tablet by mouth at bedtime  
  
 predniSONE 10 mg tablet Commonly known as:  DELTASONE  
4 per day x 3 days, then 3 per day x 3 days, then 2 per day x 3 days, then 1 per day x 3 days, then DC  
  
 propranolol  mg SR capsule Commonly known as:  INDERAL LA Take 120 mg by mouth daily. PROTONIX 40 mg granules for oral suspension Generic drug:  pantoprazole 40 mg daily. traMADol 50 mg tablet Commonly known as:  ULTRAM  
Take 1 Tab by mouth every eight (8) hours as needed for Pain. Max Daily Amount: 150 mg. Follow-up Instructions Return in about 2 months (around 9/26/2017). Introducing [facility] & HEALTH SERVICES! Dear Edgar Hodge: Thank you for requesting a [employer] account. Our records indicate that you already have an active [employer] account. You can access your account anytime at [email or web address] Did you know that you can access your hospital and ER discharge instructions at any time in [employer]? You can also review all of your test results from your hospital stay or ER visit. Additional Information If you have questions, please visit the Frequently Asked Questions section of the [employer] website at [email or web address] Remember, [employer] is NOT to be used for urgent needs. For medical emergencies, dial 911. Now available from your iPhone and Android! Please provide this summary of care documentation to your next provider. Your primary care clinician is listed as Jose Henderson. If you have any questions after today's visit, please call [phone].

## 2017-07-26 NOTE — PROGRESS NOTES
Darryl Engle is a 56 y.o.  male and presents with     Chief Complaint   Patient presents with    Diabetes    Hypertension    Cholesterol Problem       Pt says his sugars have been high for the past 3 months because he was taking gpilizide only once daily and metformin once daily. Now for the past week he has been taking glipizde three times daily and metformin twice daily and his sugars have dropped to around 200 in the morning. He wants to try meds before he goes onto insulin. Also frequent uriantion has improved. Past Medical History:   Diagnosis Date    Back pain     Chronic obstructive pulmonary disease ([address]     Diabetes ([address]     Hypercholesterolemia     Hypertension     Sleep apnea     Does not use CPAP     Past Surgical History:   Procedure Laterality Date    COLONOSCOPY N/A 1/23/2017    COLONOSCOPY performed by Dony Salgado MD at [address] [place] [address] St  2003    umbilacal     HX SHOULDER ARTHROSCOPY  2004    bilateral     Current Outpatient Prescriptions   Medication Sig    aspirin 81 mg chewable tablet Take 1 Tab by mouth daily.  hydroCHLOROthiazide (HYDRODIURIL) 25 mg tablet Take 1 Tab by mouth daily.  traMADol (ULTRAM) 50 mg tablet Take 1 Tab by mouth every eight (8) hours as needed for Pain. Max Daily Amount: 150 mg.    gabapentin (NEURONTIN) 300 mg capsule Take 1 Cap by mouth four (4) times daily.  pravastatin (PRAVACHOL) 40 mg tablet Take 1.5 Tabs by mouth nightly. Take one-half tablet by mouth at bedtime    losartan (COZAAR) 50 mg tablet Take  by mouth daily.  carboxymethylcellulose sodium (REFRESH TEARS) 0.5 % drop ophthalmic solution Administer 1 Drop to both eyes two (2) times a day.  cholecalciferol (VITAMIN D3) 1,000 unit tablet Take 1,000 Units by mouth daily.  omega-3 fatty acids-vitamin e (FISH OIL) 1,000 mg cap Take 1 Cap by mouth two (2) times a day.     metFORMIN (GLUCOPHAGE) 1,000 mg tablet Take 1,000 mg by mouth two (2) times daily (with meals).  pantoprazole (PROTONIX) 40 mg granules for oral suspension 40 mg daily.  propranolol LA (INDERAL LA) 120 mg SR capsule Take 120 mg by mouth daily.  glipiZIDE (GLUCOTROL) 10 mg tablet Take 10 mg by mouth three (3) times daily.  amLODIPine (NORVASC) 10 mg tablet Take 10 mg by mouth daily.  clotrimazole (LOTRIMIN) 1 % topical cream Apply  to affected area two (2) times a day.  predniSONE (DELTASONE) 10 mg tablet 4 per day x 3 days, then 3 per day x 3 days, then 2 per day x 3 days, then 1 per day x 3 days, then DC    cetirizine (ZYRTEC) 10 mg tablet Take 1 Tab by mouth daily.  fluticasone-salmeterol (ADVAIR) 250-50 mcg/dose diskus inhaler Take 1 Puff by inhalation every twelve (12) hours.  clobetasol (TEMOVATE) 0.05 % ointment Apply  to affected area two (2) times a day.  albuterol (PROVENTIL HFA, VENTOLIN HFA, PROAIR HFA) 90 mcg/actuation inhaler Take 2 Puffs by inhalation every six (6) hours as needed for Wheezing.  polyethylene glycol (MIRALAX) 17 gram/dose powder Take 17 g by mouth daily.  fluticasone (FLONASE) 50 mcg/actuation nasal spray 2 Sprays by Both Nostrils route daily. No current facility-administered medications for this visit. Health Maintenance   Topic Date Due    EYE EXAM RETINAL OR DILATED Q1  03/08/1972    INFLUENZA AGE 9 TO ADULT  08/01/2017    HEMOGLOBIN A1C Q6M  01/19/2018    FOOT EXAM Q1  03/21/2018    MICROALBUMIN Q1  04/19/2018    MEDICARE YEARLY EXAM  04/20/2018    LIPID PANEL Q1  07/19/2018    COLONOSCOPY  01/23/2022    DTaP/Tdap/Td series (2 - Td) 09/17/2025    Hepatitis C Screening  Completed    Pneumococcal 19-64 Medium Risk  Completed     Immunization History   Administered Date(s) Administered    Influenza Vaccine 09/17/2015    Influenza Vaccine (Quad) PF 10/28/2016    Pneumococcal Polysaccharide (PPSV-23) 07/27/2016    Tdap 09/17/2015     No LMP for male patient.         Allergies and Intolerances: Allergies   Allergen Reactions    Lisinopril Swelling       Family History:   Family History   Problem Relation Age of Onset    Cancer Mother     Hypertension Mother     Heart Disease Mother     Diabetes Mother     No Known Problems Father        Social History:   He  reports that he has been smoking Cigarettes. He has a 12.50 pack-year smoking history. He quit smokeless tobacco use about 16 months ago. He  reports that he drinks about 2.4 oz of alcohol per week             Review of Systems:   General: negative for - chills, fatigue, fever, weight change  Psych: negative for - anxiety, depression, irritability or mood swings  ENT: negative for - headaches, hearing change, nasal congestion, oral lesions, sneezing or sore throat  Heme/ Lymph: negative for - bleeding problems, bruising, pallor or swollen lymph nodes  Endo: negative for - hot flashes, polydipsia/polyuria or temperature intolerance  Resp: negative for - cough, shortness of breath or wheezing  CV: negative for - chest pain, edema or palpitations  GI: negative for - abdominal pain, change in bowel habits, constipation, diarrhea or nausea/vomiting  : negative for - dysuria, hematuria, incontinence, pelvic pain or vulvar/vaginal symptoms  MSK: negative for - joint pain, joint swelling or muscle pain  Neuro: negative for - confusion, headaches, seizures or weakness  Derm: negative for - dry skin, hair changes, rash or skin lesion changes          Physical:   Vitals:   Vitals:    07/26/17 0944   BP: 129/82   Pulse: 90   Resp: 15   Temp: 97.5 °F (36.4 °C)   TempSrc: Oral   SpO2: 92%   Weight: 200 lb (90.7 kg)   Height: 5' 9\" (1.753 m)           Exam:   HEENT- atraumatic,normocephalic, awake, oriented, well nourished  Neck - supple,no enlarged lymph nodes, no JVD, no thyromegaly  Chest- CTA, no rhonchi, no crackles  Heart- rrr, no murmurs / gallop/rub  Abdomen- soft,BS+,NT, no hepatosplenomegaly  Ext - no c/c/edema   Neuro- no focal deficits. Power 5/5 all extremities  Skin - warm,dry, no obvious rashes. Review of Data:   LABS:   Lab Results   Component Value Date/Time    WBC 8.5 04/19/2017 10:33 AM    HGB 13.9 04/19/2017 10:33 AM    HCT 41.9 04/19/2017 10:33 AM    PLATELET 925 84/68/2170 10:33 AM     Lab Results   Component Value Date/Time    Sodium 134 07/19/2017 10:00 AM    Potassium 4.7 07/19/2017 10:00 AM    Chloride 95 07/19/2017 10:00 AM    CO2 32 07/19/2017 10:00 AM    Glucose 325 07/19/2017 10:00 AM    BUN 16 07/19/2017 10:00 AM    Creatinine 1.05 07/19/2017 10:00 AM     Lab Results   Component Value Date/Time    Cholesterol, total 200 07/19/2017 10:00 AM    HDL Cholesterol 65 07/19/2017 10:00 AM    LDL, calculated 67.6 07/19/2017 10:00 AM    Triglyceride 337 07/19/2017 10:00 AM     No results found for: GPT    FBG - 258    Impression / Plan:        ICD-10-CM ICD-9-CM    1. Type 2 diabetes mellitus without complication, without long-term current use of insulin (HCC) E11.9 250.00      Pt has restarted taking his meds as he is supposed to , pt ants to try current meds before starting insulin or adding any other med. Will get it a trial for next 2 months. Pt reports that he had eye exam at  [facility] months back and it was good. Walk daily for 30 minutes    Explained to patient risk benefits of the medications. Advised patient to stop meds if having any side effects. Pt verbalized understanding of the instructions. I have discussed the diagnosis with the patient and the intended plan as seen in the above orders. The patient has received an after-visit summary and questions were answered concerning future plans. I have discussed medication side effects and warnings with the patient as well. I have reviewed the plan of care with the patient, accepted their input and they are in agreement with the treatment goals. Reviewed plan of care. Patient has provided input and agrees with goals.     Follow-up Disposition: Not on File    Artie CASTANO Deven Rios MD

## 2017-07-26 NOTE — PROGRESS NOTES
1. Have you been to the ER, urgent care clinic since your last visit? Hospitalized since your last visit? No    2. Have you seen or consulted any other health care providers outside of the [address] [place] [address] since your last visit? Include any pap smears or colon screening.  No

## 2017-08-23 DIAGNOSIS — M54.41 MIDLINE LOW BACK PAIN WITH RIGHT-SIDED SCIATICA, UNSPECIFIED CHRONICITY: ICD-10-CM

## 2017-08-23 NOTE — TELEPHONE ENCOUNTER
Patient called requesting printed prescriptions so he can  from the office.  Please call when ready. [phone]

## 2017-08-24 RX ORDER — PANTOPRAZOLE SODIUM 40 MG/1
40 GRANULE, DELAYED RELEASE ORAL DAILY
OUTPATIENT
Start: 2017-08-24

## 2017-08-24 RX ORDER — GABAPENTIN 300 MG/1
300 CAPSULE ORAL 4 TIMES DAILY
Qty: 120 CAP | Refills: 1 | OUTPATIENT
Start: 2017-08-24

## 2017-08-24 RX ORDER — TRAMADOL HYDROCHLORIDE 50 MG/1
50 TABLET ORAL
Qty: 40 TAB | Refills: 0 | OUTPATIENT
Start: 2017-08-24

## 2017-08-28 ENCOUNTER — OFFICE VISIT (OUTPATIENT)
Dept: FAMILY MEDICINE CLINIC | Age: 57
End: 2017-08-28

## 2017-08-28 VITALS
TEMPERATURE: 97 F | WEIGHT: 203.2 LBS | HEART RATE: 67 BPM | DIASTOLIC BLOOD PRESSURE: 75 MMHG | SYSTOLIC BLOOD PRESSURE: 137 MMHG | BODY MASS INDEX: 30.1 KG/M2 | RESPIRATION RATE: 16 BRPM | OXYGEN SATURATION: 93 % | HEIGHT: 69 IN

## 2017-08-28 DIAGNOSIS — M54.40 CHRONIC BILATERAL LOW BACK PAIN WITH SCIATICA, SCIATICA LATERALITY UNSPECIFIED: ICD-10-CM

## 2017-08-28 DIAGNOSIS — E11.9 TYPE 2 DIABETES MELLITUS WITHOUT COMPLICATION, WITHOUT LONG-TERM CURRENT USE OF INSULIN (HCC): ICD-10-CM

## 2017-08-28 DIAGNOSIS — E78.00 HYPERCHOLESTEREMIA: ICD-10-CM

## 2017-08-28 DIAGNOSIS — G89.29 CHRONIC BILATERAL LOW BACK PAIN WITH SCIATICA, SCIATICA LATERALITY UNSPECIFIED: ICD-10-CM

## 2017-08-28 DIAGNOSIS — I10 ESSENTIAL HYPERTENSION: ICD-10-CM

## 2017-08-28 DIAGNOSIS — M54.41 MIDLINE LOW BACK PAIN WITH RIGHT-SIDED SCIATICA, UNSPECIFIED CHRONICITY: Primary | ICD-10-CM

## 2017-08-28 DIAGNOSIS — I73.9 CLAUDICATION (HCC): ICD-10-CM

## 2017-08-28 RX ORDER — TRAMADOL HYDROCHLORIDE 50 MG/1
50 TABLET ORAL
Qty: 40 TAB | Refills: 0 | Status: SHIPPED | OUTPATIENT
Start: 2017-08-28 | End: 2017-12-22 | Stop reason: SDUPTHER

## 2017-08-28 RX ORDER — GABAPENTIN 300 MG/1
300 CAPSULE ORAL 4 TIMES DAILY
Qty: 120 CAP | Refills: 3 | Status: SHIPPED | OUTPATIENT
Start: 2017-08-28

## 2017-08-28 RX ORDER — PANTOPRAZOLE SODIUM 40 MG/1
40 GRANULE, DELAYED RELEASE ORAL DAILY
Status: CANCELLED | OUTPATIENT
Start: 2017-08-28

## 2017-08-28 RX ORDER — GUAIFENESIN 100 MG/5ML
81 LIQUID (ML) ORAL DAILY
Qty: 90 TAB | Refills: 1 | Status: SHIPPED | OUTPATIENT
Start: 2017-08-28 | End: 2018-07-12 | Stop reason: SDUPTHER

## 2017-08-28 NOTE — MR AVS SNAPSHOT
Visit Information Date & Time Provider Department Dept. Phone Encounter #  
 8/28/2017 11:15 AM Herminia Cantu, [address] [id number] Follow-up Instructions Return in about 2 months (around 10/28/2017). Your Appointments 9/26/2017  9:30 AM  
Follow Up with Herminia Cantu MD  
[facility] Appt Note: 2 months (around 9/26/2017). [address] [facility] [address]  
  
   
 [address] [place] [address] [address] Upcoming Health Maintenance Date Due  
 EYE EXAM RETINAL OR DILATED Q1 3/8/1972 INFLUENZA AGE 9 TO ADULT 8/1/2017 HEMOGLOBIN A1C Q6M 1/19/2018 FOOT EXAM Q1 3/21/2018 MICROALBUMIN Q1 4/19/2018 MEDICARE YEARLY EXAM 4/20/2018 LIPID PANEL Q1 7/19/2018 COLONOSCOPY 1/23/2022 DTaP/Tdap/Td series (2 - Td) 9/17/2025 Allergies as of 8/28/2017  Review Complete On: 8/28/2017 By: Herminia Cantu MD  
  
 Severity Noted Reaction Type Reactions Lisinopril  03/01/2016    Swelling Current Immunizations  Never Reviewed Name Date Influenza Vaccine 9/17/2015  9:38 AM  
 Influenza Vaccine (Quad) PF 10/28/2016 Pneumococcal Polysaccharide (PPSV-23) 7/27/2016 Tdap 9/17/2015  9:39 AM  
  
 Not reviewed this visit You Were Diagnosed With   
  
 Codes Comments Midline low back pain with right-sided sciatica, unspecified chronicity    -  Primary ICD-10-CM: M54.41 
ICD-9-CM: 724.3 Claudication [facility])     ICD-10-CM: I73.9 ICD-9-CM: 443. 9 Type 2 diabetes mellitus without complication, without long-term current use of insulin (HCC)     ICD-10-CM: E11.9 ICD-9-CM: 250.00 Hypercholesteremia     ICD-10-CM: E78.00 ICD-9-CM: 272.0 Essential hypertension     ICD-10-CM: I10 
ICD-9-CM: 401.9  Chronic bilateral low back pain with sciatica, sciatica laterality unspecified     ICD-10-CM: M54.40, G89.29 
 ICD-9-CM: 724.2, 724.3, 338.29 Vitals BP Pulse Temp Resp Height(growth percentile) Weight(growth percentile)  
 137/75 67 97 °F (36.1 °C) (Oral) 16 5' 9\" (1.753 m) 203 lb 3.2 oz (92.2 kg) SpO2 BMI Smoking Status 93% 30.01 kg/m2 Current Every Day Smoker Vitals History BMI and BSA Data Body Mass Index Body Surface Area 30.01 kg/m 2 2.12 m 2 Preferred Pharmacy Pharmacy Name Phone [facility], [address] [phone] Your Updated Medication List  
  
   
This list is accurate as of: 8/28/17 11:31 AM.  Always use your most recent med list.  
  
  
  
  
 albuterol 90 mcg/actuation inhaler Commonly known as:  PROVENTIL HFA, VENTOLIN HFA, PROAIR HFA Take 2 Puffs by inhalation every six (6) hours as needed for Wheezing. amLODIPine 10 mg tablet Commonly known as:  Yuliet Chimes Take 10 mg by mouth daily. aspirin 81 mg chewable tablet Take 1 Tab by mouth daily. carboxymethylcellulose sodium 0.5 % Drop ophthalmic solution Commonly known as:  REFRESH TEARS Administer 1 Drop to both eyes two (2) times a day. cetirizine 10 mg tablet Commonly known as:  ZYRTEC Take 1 Tab by mouth daily. cholecalciferol 1,000 unit tablet Commonly known as:  VITAMIN D3 Take 1,000 Units by mouth daily. clobetasol 0.05 % ointment Commonly known as:  Cocoa Brandon Apply  to affected area two (2) times a day. clotrimazole 1 % topical cream  
Commonly known as:  Raymond Eliudock Apply  to affected area two (2) times a day. fluticasone 50 mcg/actuation nasal spray Commonly known as:  Frederic Public 2 Sprays by Both Nostrils route daily. fluticasone-salmeterol 250-50 mcg/dose diskus inhaler Commonly known as:  ADVAIR Take 1 Puff by inhalation every twelve (12) hours. gabapentin 300 mg capsule Commonly known as:  NEURONTIN Take 1 Cap by mouth four (4) times daily. glipiZIDE 10 mg tablet Commonly known as:  Donzell Schooner Take 10 mg by mouth three (3) times daily. hydroCHLOROthiazide 25 mg tablet Commonly known as:  HYDRODIURIL Take 1 Tab by mouth daily. losartan 50 mg tablet Commonly known as:  COZAAR Take  by mouth daily. metFORMIN 1,000 mg tablet Commonly known as:  GLUCOPHAGE Take 1,000 mg by mouth two (2) times daily (with meals). omega-3 fatty acids-vitamin e 1,000 mg Cap Commonly known as:  FISH OIL Take 1 Cap by mouth two (2) times a day. polyethylene glycol 17 gram/dose powder Commonly known as:  Dariel Aloe Take 17 g by mouth daily. pravastatin 40 mg tablet Commonly known as:  PRAVACHOL Take 1.5 Tabs by mouth nightly. Take one-half tablet by mouth at bedtime  
  
 predniSONE 10 mg tablet Commonly known as:  DELTASONE  
4 per day x 3 days, then 3 per day x 3 days, then 2 per day x 3 days, then 1 per day x 3 days, then DC  
  
 propranolol  mg SR capsule Commonly known as:  INDERAL LA Take 120 mg by mouth daily. PROTONIX 40 mg granules for oral suspension Generic drug:  pantoprazole 40 mg daily. traMADol 50 mg tablet Commonly known as:  ULTRAM  
Take 1 Tab by mouth every eight (8) hours as needed for Pain. Max Daily Amount: 150 mg.  
  
  
  
  
Prescriptions Printed Refills  
 traMADol (ULTRAM) 50 mg tablet 0 Sig: Take 1 Tab by mouth every eight (8) hours as needed for Pain. Max Daily Amount: 150 mg.  
 Class: Print Route: Oral  
  
Prescriptions Sent to Pharmacy Refills  
 aspirin 81 mg chewable tablet 1 Sig: Take 1 Tab by mouth daily. Class: Normal  
 Pharmacy: [address]. [address], [address] #: [phone] Route: Oral  
 gabapentin (NEURONTIN) 300 mg capsule 3 Sig: Take 1 Cap by mouth four (4) times daily.   
 Class: Normal  
 Pharmacy: [address]. [address], [address] [facility] [address] [facility] #: [phone] Route: Oral  
  
Follow-up Instructions Return in about 2 months (around 10/28/2017). To-Do List   
 08/28/2017 Lab:  Sh[facility] Pedlar [facility] ()   
  
 08/30/2017 Imaging:  LOWER EXT ART PVR MULT LEVEL SEG PRESSURES Introducing [facility]! Dear Agnieszka Dugan: Thank you for requesting a [employer] account. Our records indicate that you already have an active [employer] account. You can access your account anytime at [email or web address] Did you know that you can access your hospital and ER discharge instructions at any time in [employer]? You can also review all of your test results from your hospital stay or ER visit. Additional Information If you have questions, please visit the Frequently Asked Questions section of the [employer] website at [email or web address] Remember, [employer] is NOT to be used for urgent needs. For medical emergencies, dial 911. Now available from your iPhone and Android! Please provide this summary of care documentation to your next provider. Your primary care clinician is listed as [id number]. If you have any questions after today's visit, please call [phone].

## 2017-08-28 NOTE — PROGRESS NOTES
1. Have you been to the ER, urgent care clinic since your last visit? Hospitalized since your last visit? No    2. Have you seen or consulted any other health care providers outside of the [address] [place] [address] since your last visit? Include any pap smears or colon screening.  No

## 2017-08-28 NOTE — PROGRESS NOTES
Candice Perdue is a 56 y.o.  male and presents with     Chief Complaint   Patient presents with    Diabetes    Hypertension    Cholesterol Problem    Back Pain    Claudication       Pt informs that his sugars have improved a lot., He check his sugars and they have been around 117 , 120 in the morning. However he continues to have back pain and needs to take tramadol off  An on. Pt also informs that he gets leg pain when he walks and has to stop every now and then. Past Medical History:   Diagnosis Date    Back pain     Chronic obstructive pulmonary disease ([address]     Diabetes ([address]     Hypercholesterolemia     Hypertension     Sleep apnea     Does not use CPAP     Past Surgical History:   Procedure Laterality Date    COLONOSCOPY N/A 1/23/2017    COLONOSCOPY performed by Moris Man MD at [address] [place] [address]  [address]    umbilacal     HX SHOULDER ARTHROSCOPY  2004    bilateral     Current Outpatient Prescriptions   Medication Sig    aspirin 81 mg chewable tablet Take 1 Tab by mouth daily.  gabapentin (NEURONTIN) 300 mg capsule Take 1 Cap by mouth four (4) times daily.  traMADol (ULTRAM) 50 mg tablet Take 1 Tab by mouth every eight (8) hours as needed for Pain. Max Daily Amount: 150 mg.    clotrimazole (LOTRIMIN) 1 % topical cream Apply  to affected area two (2) times a day.  predniSONE (DELTASONE) 10 mg tablet 4 per day x 3 days, then 3 per day x 3 days, then 2 per day x 3 days, then 1 per day x 3 days, then DC    hydroCHLOROthiazide (HYDRODIURIL) 25 mg tablet Take 1 Tab by mouth daily.  cetirizine (ZYRTEC) 10 mg tablet Take 1 Tab by mouth daily.  fluticasone-salmeterol (ADVAIR) 250-50 mcg/dose diskus inhaler Take 1 Puff by inhalation every twelve (12) hours.  pravastatin (PRAVACHOL) 40 mg tablet Take 1.5 Tabs by mouth nightly. Take one-half tablet by mouth at bedtime    losartan (COZAAR) 50 mg tablet Take  by mouth daily.     carboxymethylcellulose sodium (REFRESH TEARS) 0.5 % drop ophthalmic solution Administer 1 Drop to both eyes two (2) times a day.  clobetasol (TEMOVATE) 0.05 % ointment Apply  to affected area two (2) times a day.  cholecalciferol (VITAMIN D3) 1,000 unit tablet Take 1,000 Units by mouth daily.  albuterol (PROVENTIL HFA, VENTOLIN HFA, PROAIR HFA) 90 mcg/actuation inhaler Take 2 Puffs by inhalation every six (6) hours as needed for Wheezing.  omega-3 fatty acids-vitamin e (FISH OIL) 1,000 mg cap Take 1 Cap by mouth two (2) times a day.  polyethylene glycol (MIRALAX) 17 gram/dose powder Take 17 g by mouth daily.  fluticasone (FLONASE) 50 mcg/actuation nasal spray 2 Sprays by Both Nostrils route daily.  metFORMIN (GLUCOPHAGE) 1,000 mg tablet Take 1,000 mg by mouth two (2) times daily (with meals).  pantoprazole (PROTONIX) 40 mg granules for oral suspension 40 mg daily.  propranolol LA (INDERAL LA) 120 mg SR capsule Take 120 mg by mouth daily.  glipiZIDE (GLUCOTROL) 10 mg tablet Take 10 mg by mouth three (3) times daily.  amLODIPine (NORVASC) 10 mg tablet Take 10 mg by mouth daily. No current facility-administered medications for this visit. Health Maintenance   Topic Date Due    EYE EXAM RETINAL OR DILATED Q1  03/08/1972    INFLUENZA AGE 9 TO ADULT  08/01/2017    HEMOGLOBIN A1C Q6M  01/19/2018    FOOT EXAM Q1  03/21/2018    MICROALBUMIN Q1  04/19/2018    MEDICARE YEARLY EXAM  04/20/2018    LIPID PANEL Q1  07/19/2018    COLONOSCOPY  01/23/2022    DTaP/Tdap/Td series (2 - Td) 09/17/2025    Hepatitis C Screening  Completed    Pneumococcal 19-64 Medium Risk  Completed     Immunization History   Administered Date(s) Administered    Influenza Vaccine 09/17/2015    Influenza Vaccine (Quad) PF 10/28/2016    Pneumococcal Polysaccharide (PPSV-23) 07/27/2016    Tdap 09/17/2015     No LMP for male patient. Allergies and Intolerances:    Allergies   Allergen Reactions    Lisinopril Swelling Family History:   Family History   Problem Relation Age of Onset    Cancer Mother     Hypertension Mother     Heart Disease Mother     Diabetes Mother     No Known Problems Father        Social History:   He  reports that he has been smoking Cigarettes. He has a 12.50 pack-year smoking history. He quit smokeless tobacco use about 17 months ago. He  reports that he drinks about 2.4 oz of alcohol per week             Review of Systems:   General: negative for - chills, fatigue, fever, weight change  Psych: negative for - anxiety, depression, irritability or mood swings  ENT: negative for - headaches, hearing change, nasal congestion, oral lesions, sneezing or sore throat  Heme/ Lymph: negative for - bleeding problems, bruising, pallor or swollen lymph nodes  Endo: negative for - hot flashes, polydipsia/polyuria or temperature intolerance  Resp: negative for - cough, shortness of breath or wheezing  CV: negative for - chest pain, edema or palpitations  GI: negative for - abdominal pain, change in bowel habits, constipation, diarrhea or nausea/vomiting  : negative for - dysuria, hematuria, incontinence, pelvic pain or vulvar/vaginal symptoms  MSK: negative for - joint pain, joint swelling or muscle pain,pos for leg pain  Neuro: negative for - confusion, headaches, seizures or weakness  Derm: negative for - dry skin, hair changes, rash or skin lesion changes          Physical:   Vitals:   Vitals:    08/28/17 1109   BP: 137/75   Pulse: 67   Resp: 16   Temp: 97 °F (36.1 °C)   TempSrc: Oral   SpO2: 93%   Weight: 203 lb 3.2 oz (92.2 kg)   Height: 5' 9\" (1.753 m)           Exam:   HEENT- atraumatic,normocephalic, awake, oriented, well nourished  Neck - supple,no enlarged lymph nodes, no JVD, no thyromegaly  Chest- CTA, no rhonchi, no crackles  Heart- rrr, no murmurs / gallop/rub  Abdomen- soft,BS+,NT, no hepatosplenomegaly  Ext - no c/c/edema ,DP , PT weak  Neuro- no focal deficits. Power 5/5 all extremities,SLR pos   Skin - warm,dry, no obvious rashes. Review of Data:   LABS:   Lab Results   Component Value Date/Time    WBC 8.5 04/19/2017 10:33 AM    HGB 13.9 04/19/2017 10:33 AM    HCT 41.9 04/19/2017 10:33 AM    PLATELET 750 26/85/5636 10:33 AM     Lab Results   Component Value Date/Time    Sodium 134 07/19/2017 10:00 AM    Potassium 4.7 07/19/2017 10:00 AM    Chloride 95 07/19/2017 10:00 AM    CO2 32 07/19/2017 10:00 AM    Glucose 325 07/19/2017 10:00 AM    BUN 16 07/19/2017 10:00 AM    Creatinine 1.05 07/19/2017 10:00 AM     Lab Results   Component Value Date/Time    Cholesterol, total 200 07/19/2017 10:00 AM    HDL Cholesterol 65 07/19/2017 10:00 AM    LDL, calculated 67.6 07/19/2017 10:00 AM    Triglyceride 337 07/19/2017 10:00 AM     No results found for: GPT        Impression / Plan:        ICD-10-CM ICD-9-CM    1. Midline low back pain with right-sided sciatica, unspecified chronicity M54.41 724.3 gabapentin (NEURONTIN) 300 mg capsule      traMADol (ULTRAM) 50 mg tablet   2. Claudication (HCC) I73.9 443.9 LOWER EXT ART PVR MULT LEVEL SEG PRESSURES   3. Type 2 diabetes mellitus without complication, without long-term current use of insulin (HCC) E11.9 250.00    4. Hypercholesteremia E78.00 272.0    5. Essential hypertension I10 401.9    6. Chronic bilateral low back pain with sciatica, sciatica laterality unspecified M54.40 724.2 TOXASSURE SELECT 13 (MW)    G89.29 724.3      338.29       reviewed. Pain contract signed. DM sugars trending down    R/o PAD. HTN- stable            Explained to patient risk benefits of the medications. Advised patient to stop meds if having any side effects. Pt verbalized understanding of the instructions. I have discussed the diagnosis with the patient and the intended plan as seen in the above orders. The patient has received an after-visit summary and questions were answered concerning future plans.   I have discussed medication side effects and warnings with the patient as well. I have reviewed the plan of care with the patient, accepted their input and they are in agreement with the treatment goals. Reviewed plan of care. Patient has provided input and agrees with goals.     Follow-up Disposition: Not on Angelrachael Ignacio MD

## 2017-08-31 ENCOUNTER — HOSPITAL ENCOUNTER (OUTPATIENT)
Dept: VASCULAR SURGERY | Age: 57
Discharge: HOME | End: 2017-08-31
Attending: INTERNAL MEDICINE
Payer: MEDICARE

## 2017-08-31 DIAGNOSIS — I73.9 CLAUDICATION (HCC): ICD-10-CM

## 2017-08-31 PROCEDURE — 93923 UPR/LXTR ART STDY 3+ LVLS: CPT

## 2017-08-31 NOTE — PROCEDURES
[facility]  *** FINAL REPORT ***    Name: Herrera Merino  MRN: [id number]    Outpatient  : 08 Mar 1960  HIS Order #: [id number]  [id number] [facility] Visit #: [id number]  Date: 31 Aug 2017    TYPE OF TEST: Peripheral Arterial Testing    REASON FOR TEST  Limb pain    Right Leg  Segmentals: Normal                     mmHg  Brachial         126  High thigh  Low thigh        184  Calf             168  Posterior tibial 159  Dorsalis pedis   148  Peroneal  Metatarsal  Toe pressure     124  Doppler:    Normal  Ankle/Brachial: 1.19    Left Leg  Segmentals: Normal                     mmHg  Brachial         134  High thigh  Low thigh        161  Calf             140  Posterior tibial 150  Dorsalis pedis   143  Peroneal  Metatarsal  Toe pressure     114  Doppler:    Normal  Ankle/Brachial: 1.12    INTERPRETATION/FINDINGS  Physiologic testing was performed using continuous wave Doppler and  segmental pressures. 1. No evidence of significant peripheral arterial disease at rest in  the right leg. 2. No evidence of significant peripheral arterial disease at rest in  the left leg. 3. The right ankle/brachial index is 1.19 and the left ankle/brachial  index is 1.12.  4. The right digital/brachial index is . 93 and the left  digital/brachial index is . 85. ADDITIONAL COMMENTS    I have personally reviewed the data relevant to the interpretation of  this  study. TECHNOLOGIST: Jenifer Christy. MARTIN Lauren  Signed: 2017 11:52 AM    PHYSICIAN: Porfirio Christianson.  Mine Diez MD  Signed: 2017 11:45 PM

## 2017-09-11 ENCOUNTER — TELEPHONE (OUTPATIENT)
Dept: FAMILY MEDICINE CLINIC | Age: 57
End: 2017-09-11

## 2017-09-11 NOTE — TELEPHONE ENCOUNTER
Please result LOWER EXT ART PVR MULT LEVEL SEG PRESSURES  LOWER EXT ART PVR MULT LEVEL SEG PRESSURES./  Once resulted, pt will be called.

## 2017-09-12 NOTE — TELEPHONE ENCOUNTER
Spoke with patient, pt states his legs are hurting and pt continue to  feel tingling and pt state the bottom of his right foot continues to feel numb. Please advise.

## 2017-09-13 NOTE — TELEPHONE ENCOUNTER
Pt has uncontrolled DM that is causing neuropathy. He needs tocontrol his sugars and continue neurontin. He can set appt if he has any questions, bring blood sugar log.

## 2017-09-14 NOTE — TELEPHONE ENCOUNTER
Spoke with patient, patient will continue to track glucose levels. Pt states that he keeps it under control at this time. Advised to continue taking Neurontin. This encounter will be closed.

## 2017-12-22 ENCOUNTER — OFFICE VISIT (OUTPATIENT)
Dept: FAMILY MEDICINE CLINIC | Age: 57
End: 2017-12-22

## 2017-12-22 ENCOUNTER — HOSPITAL ENCOUNTER (OUTPATIENT)
Dept: LAB | Age: 57
Discharge: HOME | End: 2017-12-22
Payer: MEDICARE

## 2017-12-22 VITALS
TEMPERATURE: 97.7 F | WEIGHT: 205 LBS | RESPIRATION RATE: 18 BRPM | BODY MASS INDEX: 30.36 KG/M2 | OXYGEN SATURATION: 90 % | DIASTOLIC BLOOD PRESSURE: 93 MMHG | HEART RATE: 87 BPM | SYSTOLIC BLOOD PRESSURE: 142 MMHG | HEIGHT: 69 IN

## 2017-12-22 DIAGNOSIS — Z23 ENCOUNTER FOR IMMUNIZATION: ICD-10-CM

## 2017-12-22 DIAGNOSIS — G89.29 CHRONIC MIDLINE LOW BACK PAIN WITHOUT SCIATICA: ICD-10-CM

## 2017-12-22 DIAGNOSIS — Z79.4 TYPE 2 DIABETES MELLITUS WITHOUT COMPLICATION, WITH LONG-TERM CURRENT USE OF INSULIN (HCC): Primary | ICD-10-CM

## 2017-12-22 DIAGNOSIS — M54.41 MIDLINE LOW BACK PAIN WITH RIGHT-SIDED SCIATICA, UNSPECIFIED CHRONICITY: ICD-10-CM

## 2017-12-22 DIAGNOSIS — M54.50 CHRONIC MIDLINE LOW BACK PAIN WITHOUT SCIATICA: ICD-10-CM

## 2017-12-22 DIAGNOSIS — E11.9 TYPE 2 DIABETES MELLITUS WITHOUT COMPLICATION, WITH LONG-TERM CURRENT USE OF INSULIN (HCC): Primary | ICD-10-CM

## 2017-12-22 DIAGNOSIS — E11.9 TYPE 2 DIABETES MELLITUS WITHOUT COMPLICATION, WITH LONG-TERM CURRENT USE OF INSULIN (HCC): ICD-10-CM

## 2017-12-22 DIAGNOSIS — Z79.4 TYPE 2 DIABETES MELLITUS WITHOUT COMPLICATION, WITH LONG-TERM CURRENT USE OF INSULIN (HCC): ICD-10-CM

## 2017-12-22 PROBLEM — E11.21 TYPE 2 DIABETES MELLITUS WITH NEPHROPATHY (HCC): Status: ACTIVE | Noted: 2017-12-22

## 2017-12-22 LAB
ALBUMIN SERPL-MCNC: 3.6 G/DL (ref 3.4–5)
ALBUMIN/GLOB SERPL: 1 {RATIO} (ref 0.8–1.7)
ALP SERPL-CCNC: 63 U/L (ref 45–117)
ALT SERPL-CCNC: 23 U/L (ref 16–61)
ANION GAP SERPL CALC-SCNC: 8 MMOL/L (ref 3–18)
AST SERPL-CCNC: 21 U/L (ref 15–37)
BASOPHILS # BLD: 0 K/UL (ref 0–0.06)
BASOPHILS NFR BLD: 0 % (ref 0–2)
BILIRUB SERPL-MCNC: 0.4 MG/DL (ref 0.2–1)
BUN SERPL-MCNC: 11 MG/DL (ref 7–18)
BUN/CREAT SERPL: 14 (ref 12–20)
CALCIUM SERPL-MCNC: 9 MG/DL (ref 8.5–10.1)
CHLORIDE SERPL-SCNC: 98 MMOL/L (ref 100–108)
CHOLEST SERPL-MCNC: 187 MG/DL
CO2 SERPL-SCNC: 31 MMOL/L (ref 21–32)
CREAT SERPL-MCNC: 0.81 MG/DL (ref 0.6–1.3)
DIFFERENTIAL METHOD BLD: ABNORMAL
EOSINOPHIL # BLD: 0.2 K/UL (ref 0–0.4)
EOSINOPHIL NFR BLD: 3 % (ref 0–5)
ERYTHROCYTE [DISTWIDTH] IN BLOOD BY AUTOMATED COUNT: 14.3 % (ref 11.6–14.5)
EST. AVERAGE GLUCOSE BLD GHB EST-MCNC: 143 MG/DL
GLOBULIN SER CALC-MCNC: 3.6 G/DL (ref 2–4)
GLUCOSE SERPL-MCNC: 161 MG/DL (ref 74–99)
HBA1C MFR BLD: 6.6 % (ref 4.2–5.6)
HCT VFR BLD AUTO: 44.2 % (ref 36–48)
HDLC SERPL-MCNC: 58 MG/DL (ref 40–60)
HDLC SERPL: 3.2 {RATIO} (ref 0–5)
HGB BLD-MCNC: 14.6 G/DL (ref 13–16)
LDLC SERPL CALC-MCNC: 90.6 MG/DL (ref 0–100)
LIPID PROFILE,FLP: ABNORMAL
LYMPHOCYTES # BLD: 3 K/UL (ref 0.9–3.6)
LYMPHOCYTES NFR BLD: 36 % (ref 21–52)
MCH RBC QN AUTO: 32.4 PG (ref 24–34)
MCHC RBC AUTO-ENTMCNC: 33 G/DL (ref 31–37)
MCV RBC AUTO: 98 FL (ref 74–97)
MONOCYTES # BLD: 0.7 K/UL (ref 0.05–1.2)
MONOCYTES NFR BLD: 8 % (ref 3–10)
NEUTS SEG # BLD: 4.5 K/UL (ref 1.8–8)
NEUTS SEG NFR BLD: 53 % (ref 40–73)
PLATELET # BLD AUTO: 243 K/UL (ref 135–420)
PMV BLD AUTO: 11.5 FL (ref 9.2–11.8)
POTASSIUM SERPL-SCNC: 4 MMOL/L (ref 3.5–5.5)
PROT SERPL-MCNC: 7.2 G/DL (ref 6.4–8.2)
RBC # BLD AUTO: 4.51 M/UL (ref 4.7–5.5)
SODIUM SERPL-SCNC: 137 MMOL/L (ref 136–145)
TRIGL SERPL-MCNC: 192 MG/DL (ref ?–150)
VLDLC SERPL CALC-MCNC: 38.4 MG/DL
WBC # BLD AUTO: 8.5 K/UL (ref 4.6–13.2)

## 2017-12-22 PROCEDURE — 80053 COMPREHEN METABOLIC PANEL: CPT | Performed by: INTERNAL MEDICINE

## 2017-12-22 PROCEDURE — 85025 COMPLETE CBC W/AUTO DIFF WBC: CPT | Performed by: INTERNAL MEDICINE

## 2017-12-22 PROCEDURE — 83036 HEMOGLOBIN GLYCOSYLATED A1C: CPT | Performed by: INTERNAL MEDICINE

## 2017-12-22 PROCEDURE — G0480 DRUG TEST DEF 1-7 CLASSES: HCPCS | Performed by: INTERNAL MEDICINE

## 2017-12-22 PROCEDURE — 36415 COLL VENOUS BLD VENIPUNCTURE: CPT | Performed by: INTERNAL MEDICINE

## 2017-12-22 PROCEDURE — 80061 LIPID PANEL: CPT | Performed by: INTERNAL MEDICINE

## 2017-12-22 RX ORDER — TRAMADOL HYDROCHLORIDE 50 MG/1
50 TABLET ORAL
Qty: 40 TAB | Refills: 0 | Status: SHIPPED | OUTPATIENT
Start: 2017-12-22 | End: 2018-06-08 | Stop reason: SDUPTHER

## 2017-12-22 NOTE — PROGRESS NOTES
[facility] 56 [facility]  [facility] and presents with     Chief Complaint   Patient presents with    Diabetes    Hypertension    Back Pain    Leg Swelling       Pt  Says he has been seeing endocrine  At the [place]. Pt made changes in his diet and his sugars have improved. FBG - around 90- 100. Pt does have chronic back pain. He does have numbness in his feet also . He is taking his blood pressure meds. Past Medical History:   Diagnosis Date    Back pain     Chronic obstructive pulmonary disease ([facility] [address]     Diabetes ([facility] [address]     Hypercholesterolemia     Hypertension     Sleep apnea     Does not use CPAP     Past Surgical History:   Procedure Laterality Date    COLONOSCOPY N/A 1/23/2017    COLONOSCOPY performed by Bob Ruiz MD at [address] [place] [address] [address]  2003    umbilacal     HX SHOULDER ARTHROSCOPY  2004    bilateral     Current Outpatient Prescriptions   Medication Sig    traMADol (ULTRAM) 50 mg tablet Take 1 Tab by mouth every eight (8) hours as needed for Pain. Max Daily Amount: 150 mg.    aspirin 81 mg chewable tablet Take 1 Tab by mouth daily.  gabapentin (NEURONTIN) 300 mg capsule Take 1 Cap by mouth four (4) times daily.  clotrimazole (LOTRIMIN) 1 % topical cream Apply  to affected area two (2) times a day.  predniSONE (DELTASONE) 10 mg tablet 4 per day x 3 days, then 3 per day x 3 days, then 2 per day x 3 days, then 1 per day x 3 days, then DC    hydroCHLOROthiazide (HYDRODIURIL) 25 mg tablet Take 1 Tab by mouth daily.  cetirizine (ZYRTEC) 10 mg tablet Take 1 Tab by mouth daily.  fluticasone-salmeterol (ADVAIR) 250-50 mcg/dose diskus inhaler Take 1 Puff by inhalation every twelve (12) hours.  losartan (COZAAR) 50 mg tablet Take  by mouth daily.  carboxymethylcellulose sodium (REFRESH TEARS) 0.5 % drop ophthalmic solution Administer 1 Drop to both eyes two (2) times a day.     clobetasol (TEMOVATE) 0.05 % ointment Apply  to affected area two (2) times a day.  cholecalciferol (VITAMIN D3) 1,000 unit tablet Take 1,000 Units by mouth daily.  albuterol (PROVENTIL HFA, VENTOLIN HFA, PROAIR HFA) 90 mcg/actuation inhaler Take 2 Puffs by inhalation every six (6) hours as needed for Wheezing.  omega-3 fatty acids-vitamin e (FISH OIL) 1,000 mg cap Take 1 Cap by mouth two (2) times a day.  polyethylene glycol (MIRALAX) 17 gram/dose powder Take 17 g by mouth daily.  fluticasone (FLONASE) 50 mcg/actuation nasal spray 2 Sprays by Both Nostrils route daily.  metFORMIN (GLUCOPHAGE) 1,000 mg tablet Take 1,000 mg by mouth two (2) times daily (with meals).  pantoprazole (PROTONIX) 40 mg granules for oral suspension 40 mg daily.  propranolol LA (INDERAL LA) 120 mg SR capsule Take 120 mg by mouth daily.  glipiZIDE (GLUCOTROL) 10 mg tablet Take 10 mg by mouth three (3) times daily.  amLODIPine (NORVASC) 10 mg tablet Take 10 mg by mouth daily.  pravastatin (PRAVACHOL) 40 mg tablet Take 1.5 Tabs by mouth nightly. Take one-half tablet by mouth at bedtime     No current facility-administered medications for this visit. Health Maintenance   Topic Date Due    EYE EXAM RETINAL OR DILATED Q1  03/08/1972    HEMOGLOBIN A1C Q6M  01/19/2018    FOOT EXAM Q1  03/21/2018    MICROALBUMIN Q1  04/19/2018    MEDICARE YEARLY EXAM  04/20/2018    LIPID PANEL Q1  07/19/2018    COLONOSCOPY  01/23/2022    DTaP/Tdap/Td series (2 - Td) 09/17/2025    Hepatitis C Screening  Completed    Pneumococcal 19-64 Medium Risk  Completed    Influenza Age 5 to Adult  Completed     Immunization History   Administered Date(s) Administered    Influenza Vaccine 09/17/2015    Influenza Vaccine (Quad) PF 10/28/2016, 12/22/2017    Pneumococcal Polysaccharide (PPSV-23) 07/27/2016    Tdap 09/17/2015     No LMP for male patient. Allergies and Intolerances:    Allergies   Allergen Reactions    Lisinopril Swelling       Family History:   Family History   Problem Relation Age of Onset    Cancer Mother     Hypertension Mother     Heart Disease Mother     Diabetes Mother     No Known Problems Father        Social History:   He  reports that he has been smoking Cigarettes. He has a 12.50 pack-year smoking history. He quit smokeless tobacco use about 21 months ago. He  reports that he drinks about 2.4 oz of alcohol per week             Review of Systems:   General: negative for - chills, fatigue, fever, weight change  Psych: negative for - anxiety, depression, irritability or mood swings  ENT: negative for - headaches, hearing change, nasal congestion, oral lesions, sneezing or sore throat  Heme/ Lymph: negative for - bleeding problems, bruising, pallor or swollen lymph nodes  Endo: negative for - hot flashes, polydipsia/polyuria or temperature intolerance  Resp: negative for - cough, shortness of breath or wheezing  CV: negative for - chest pain, edema or palpitations  GI: negative for - abdominal pain, change in bowel habits, constipation, diarrhea or nausea/vomiting  : negative for - dysuria, hematuria, incontinence, pelvic pain or vulvar/vaginal symptoms  MSK: negative for - joint pain, joint swelling or muscle pain, pos for back pain, numbness in feet  Neuro: negative for - confusion, headaches, seizures or weakness  Derm: negative for - dry skin, hair changes, rash or skin lesion changes          Physical:   Vitals:   Vitals:    12/22/17 0904   BP: (!) 142/93   Pulse: 87   Resp: 18   Temp: 97.7 °F (36.5 °C)   TempSrc: Oral   SpO2: 90%   Weight: 205 lb (93 kg)   Height: 5' 9\" (1.753 m)           Exam:   HEENT- atraumatic,normocephalic, awake, oriented, well nourished  Neck - supple,no enlarged lymph nodes, no JVD, no thyromegaly  Chest- CTA, no rhonchi, no crackles  Heart- rrr, no murmurs / gallop/rub  Abdomen- soft,BS+,NT, no hepatosplenomegaly  Ext - no c/c/edema   Neuro- no focal deficits. Power 5/5 all extremities  Skin - warm,dry, no obvious rashes.           Review of Data:   LABS:   Lab Results   Component Value Date/Time    WBC 8.5 04/19/2017 10:33 AM    HGB 13.9 04/19/2017 10:33 AM    HCT 41.9 04/19/2017 10:33 AM    PLATELET 067 14/28/8985 10:33 AM     Lab Results   Component Value Date/Time    Sodium 134 07/19/2017 10:00 AM    Potassium 4.7 07/19/2017 10:00 AM    Chloride 95 07/19/2017 10:00 AM    CO2 32 07/19/2017 10:00 AM    Glucose 325 07/19/2017 10:00 AM    BUN 16 07/19/2017 10:00 AM    Creatinine 1.05 07/19/2017 10:00 AM     Lab Results   Component Value Date/Time    Cholesterol, total 200 07/19/2017 10:00 AM    HDL Cholesterol 65 07/19/2017 10:00 AM    LDL, calculated 67.6 07/19/2017 10:00 AM    Triglyceride 337 07/19/2017 10:00 AM     No results found for: GPT        Impression / Plan:        ICD-10-CM ICD-9-CM    1. Type 2 diabetes mellitus without complication, with long-term current use of insulin (HCC) E11.9 250.00 HEMOGLOBIN A1C WITH EAG    Y88.2 D41.11 METABOLIC PANEL, COMPREHENSIVE      LIPID PANEL      CBC WITH AUTOMATED DIFF   2. Encounter for immunization Z23 V03.89 INFLUENZA VIRUS VAC QUAD,SPLIT,PRESV FREE SYRINGE IM      ADMIN INFLUENZA VIRUS VAC   3. Midline low back pain with right-sided sciatica, unspecified chronicity M54.41 724.3 traMADol (ULTRAM) 50 mg tablet      TOXASSURE SELECT 13 (MW)   4. Chronic midline low back pain without sciatica M54.5 724.2     G89.29 338.29      DM - sugars improved per pt. HTN - stable      Bring all meds next time.  reviewed. Explained to patient risk benefits of the medications. Advised patient to stop meds if having any side effects. Pt verbalized understanding of the instructions. I have discussed the diagnosis with the patient and the intended plan as seen in the above orders. The patient has received an after-visit summary and questions were answered concerning future plans. I have discussed medication side effects and warnings with the patient as well.  I have reviewed the plan of care with the patient, accepted their input and they are in agreement with the treatment goals. Reviewed plan of care. Patient has provided input and agrees with goals.     Follow-up Disposition: Not on Jodie Poe MD

## 2018-01-03 LAB — TOXASSURE SELECT 13: NORMAL

## 2018-01-30 ENCOUNTER — DOCUMENTATION ONLY (OUTPATIENT)
Dept: FAMILY MEDICINE CLINIC | Age: 58
End: 2018-01-30

## 2018-01-30 NOTE — LETTER
1/30/2018 Andrade Cruz [address] Dear Mr. Andrade Cruz, We had an appointment reserved for you 1/22/2018 and were concerned when you did not show or call within 24 hours to cancel the appointment. Our policy is to call patients two days prior to their appointment to remind them of the date and time. We perform these calls as a courtesy to our patients and to allow us the opportunity to rebook the time slot should the appointment not be necessary. Recognizing that everyones time is valuable and that appointment time is limited, we ask that you provide 24 hours notice if you are unable to keep your appointment. Please call us at your earliest convenience to reschedule your appointment as your provider felt it was important to see you. Thank you for your anticipated cooperation. The scheduling staff: 
 
[address] [place] [address],[address] [id number] 
[phone]

## 2018-02-08 ENCOUNTER — OFFICE VISIT (OUTPATIENT)
Dept: FAMILY MEDICINE CLINIC | Age: 58
End: 2018-02-08

## 2018-02-08 VITALS
HEART RATE: 90 BPM | TEMPERATURE: 97.9 F | DIASTOLIC BLOOD PRESSURE: 91 MMHG | SYSTOLIC BLOOD PRESSURE: 144 MMHG | OXYGEN SATURATION: 95 % | HEIGHT: 69 IN | BODY MASS INDEX: 29.83 KG/M2 | WEIGHT: 201.4 LBS | RESPIRATION RATE: 20 BRPM

## 2018-02-08 DIAGNOSIS — I10 ESSENTIAL HYPERTENSION: ICD-10-CM

## 2018-02-08 DIAGNOSIS — M77.12 LEFT TENNIS ELBOW: ICD-10-CM

## 2018-02-08 DIAGNOSIS — E78.00 HYPERCHOLESTEREMIA: ICD-10-CM

## 2018-02-08 DIAGNOSIS — E11.21 TYPE 2 DIABETES MELLITUS WITH NEPHROPATHY (HCC): Primary | ICD-10-CM

## 2018-02-08 DIAGNOSIS — M54.50 CHRONIC MIDLINE LOW BACK PAIN WITHOUT SCIATICA: ICD-10-CM

## 2018-02-08 DIAGNOSIS — G89.29 CHRONIC MIDLINE LOW BACK PAIN WITHOUT SCIATICA: ICD-10-CM

## 2018-02-08 DIAGNOSIS — K21.9 GASTROESOPHAGEAL REFLUX DISEASE WITHOUT ESOPHAGITIS: ICD-10-CM

## 2018-02-08 DIAGNOSIS — J30.1 CHRONIC SEASONAL ALLERGIC RHINITIS DUE TO POLLEN: ICD-10-CM

## 2018-02-08 DIAGNOSIS — J30.2 OTHER SEASONAL ALLERGIC RHINITIS: ICD-10-CM

## 2018-02-08 RX ORDER — FLUTICASONE PROPIONATE 50 MCG
2 SPRAY, SUSPENSION (ML) NASAL DAILY
Qty: 1 BOTTLE | Refills: 3 | Status: SHIPPED | OUTPATIENT
Start: 2018-02-08 | End: 2018-05-08 | Stop reason: SDUPTHER

## 2018-02-08 RX ORDER — PANTOPRAZOLE SODIUM 40 MG/1
40 GRANULE, DELAYED RELEASE ORAL DAILY
Qty: 30 EACH | Refills: 3 | Status: SHIPPED | OUTPATIENT
Start: 2018-02-08 | End: 2018-06-25 | Stop reason: SDUPTHER

## 2018-02-08 RX ORDER — CETIRIZINE HCL 10 MG
10 TABLET ORAL DAILY
Qty: 30 TAB | Refills: 3 | Status: SHIPPED | OUTPATIENT
Start: 2018-02-08 | End: 2018-07-12 | Stop reason: SDUPTHER

## 2018-02-08 NOTE — PROGRESS NOTES
1. Have you been to the ER, urgent care clinic since your last visit? Hospitalized since your last visit? {Yes when where and reason for visit:20441}    2. Have you seen or consulted any other health care providers outside of the [address] [place] [address] since your last visit? Include any pap smears or colon screening.  {Yes when where and reason for visit:20441}

## 2018-02-08 NOTE — PROGRESS NOTES
Nichol Perez is a 56 y.o.  male and presents with     Chief Complaint   Patient presents with    Results    Follow-up    Diabetes    Hypertension    Cholesterol Problem    Allergic Rhinitis    Heartburn       Pt informs that the back pain has improved and he does not need any meds for that. He quit smoking. His sugars have improved. Pt has allergies as well as gerd      Past Medical History:   Diagnosis Date    Back pain     Chronic obstructive pulmonary disease ([address]     Diabetes ([facility] [address]     Hypercholesterolemia     Hypertension     Sleep apnea     Does not use CPAP     Past Surgical History:   Procedure Laterality Date    COLONOSCOPY N/A 1/23/2017    COLONOSCOPY performed by Lennie Perry MD at [address] [place] [address]  2003    umbilacal     HX SHOULDER ARTHROSCOPY  2004    bilateral     Current Outpatient Prescriptions   Medication Sig    fluticasone (FLONASE) 50 mcg/actuation nasal spray 2 Sprays by Both Nostrils route daily.  cetirizine (ZYRTEC) 10 mg tablet Take 1 Tab by mouth daily.  pantoprazole (PROTONIX) 40 mg granules for oral suspension Take 40 mg by mouth daily.  traMADol (ULTRAM) 50 mg tablet Take 1 Tab by mouth every eight (8) hours as needed for Pain. Max Daily Amount: 150 mg.    aspirin 81 mg chewable tablet Take 1 Tab by mouth daily.  gabapentin (NEURONTIN) 300 mg capsule Take 1 Cap by mouth four (4) times daily.  clotrimazole (LOTRIMIN) 1 % topical cream Apply  to affected area two (2) times a day.  predniSONE (DELTASONE) 10 mg tablet 4 per day x 3 days, then 3 per day x 3 days, then 2 per day x 3 days, then 1 per day x 3 days, then DC    hydroCHLOROthiazide (HYDRODIURIL) 25 mg tablet Take 1 Tab by mouth daily.  fluticasone-salmeterol (ADVAIR) 250-50 mcg/dose diskus inhaler Take 1 Puff by inhalation every twelve (12) hours.  pravastatin (PRAVACHOL) 40 mg tablet Take 1.5 Tabs by mouth nightly.  Take one-half tablet by mouth at bedtime    losartan (COZAAR) 50 mg tablet Take  by mouth daily.  carboxymethylcellulose sodium (REFRESH TEARS) 0.5 % drop ophthalmic solution Administer 1 Drop to both eyes two (2) times a day.  clobetasol (TEMOVATE) 0.05 % ointment Apply  to affected area two (2) times a day.  cholecalciferol (VITAMIN D3) 1,000 unit tablet Take 1,000 Units by mouth daily.  albuterol (PROVENTIL HFA, VENTOLIN HFA, PROAIR HFA) 90 mcg/actuation inhaler Take 2 Puffs by inhalation every six (6) hours as needed for Wheezing.  omega-3 fatty acids-vitamin e (FISH OIL) 1,000 mg cap Take 1 Cap by mouth two (2) times a day.  polyethylene glycol (MIRALAX) 17 gram/dose powder Take 17 g by mouth daily.  fluticasone (FLONASE) 50 mcg/actuation nasal spray 2 Sprays by Both Nostrils route daily.  metFORMIN (GLUCOPHAGE) 1,000 mg tablet Take 1,000 mg by mouth two (2) times daily (with meals).  propranolol LA (INDERAL LA) 120 mg SR capsule Take 120 mg by mouth daily.  glipiZIDE (GLUCOTROL) 10 mg tablet Take 10 mg by mouth three (3) times daily.  amLODIPine (NORVASC) 10 mg tablet Take 10 mg by mouth daily. No current facility-administered medications for this visit. Health Maintenance   Topic Date Due    EYE EXAM RETINAL OR DILATED Q1  03/08/1972    FOOT EXAM Q1  03/21/2018    MICROALBUMIN Q1  04/19/2018    MEDICARE YEARLY EXAM  04/20/2018    HEMOGLOBIN A1C Q6M  06/22/2018    LIPID PANEL Q1  12/22/2018    COLONOSCOPY  01/23/2022    DTaP/Tdap/Td series (2 - Td) 09/17/2025    Hepatitis C Screening  Completed    Pneumococcal 19-64 Medium Risk  Completed    Influenza Age 5 to Adult  Completed     Immunization History   Administered Date(s) Administered    Influenza Vaccine 09/17/2015    Influenza Vaccine (Quad) PF 10/28/2016, 12/22/2017    Pneumococcal Polysaccharide (PPSV-23) 07/27/2016    Tdap 09/17/2015     No LMP for male patient. Allergies and Intolerances:    Allergies   Allergen Reactions  Lisinopril Swelling       Family History:   Family History   Problem Relation Age of Onset   [facility] Cancer Mother     Hypertension Mother     Heart Disease Mother     Diabetes Mother     No Known Problems Father        Social History:   He  reports that he has been smoking Cigarettes. He has a 12.50 pack-year smoking history. He quit smokeless tobacco use about 22 months ago.   He  reports that he drinks about 2.4 oz of alcohol per week             Review of Systems: pos for allergies and gerd  General: negative for - chills, fatigue, fever, weight change  Psych: negative for - anxiety, depression, irritability or mood swings  ENT: negative for - headaches, hearing change, nasal congestion, oral lesions, sneezing or sore throat  Heme/ Lymph: negative for - bleeding problems, bruising, pallor or swollen lymph nodes  Endo: negative for - hot flashes, polydipsia/polyuria or temperature intolerance  Resp: negative for - cough, shortness of breath or wheezing  CV: negative for - chest pain, edema or palpitations  GI: negative for - abdominal pain, change in bowel habits, constipation, diarrhea or nausea/vomiting  : negative for - dysuria, hematuria, incontinence, pelvic pain or vulvar/vaginal symptoms  MSK: negative for - joint pain, joint swelling or muscle pain  Neuro: negative for - confusion, headaches, seizures or weakness  Derm: negative for - dry skin, hair changes, rash or skin lesion changes          Physical:   Vitals:   Vitals:    02/08/18 0924   BP: (!) 144/91   Pulse: 90   Resp: 20   Temp: 97.9 °F (36.6 °C)   TempSrc: Oral   SpO2: 95%   Weight: 201 lb 6.4 oz (91.4 kg)   Height: 5' 9\" (1.753 m)           Exam:   HEENT- atraumatic,normocephalic, awake, oriented, well nourished  Neck - supple,no enlarged lymph nodes, no JVD, no thyromegaly  Chest- CTA, no rhonchi, no crackles  Heart- rrr, no murmurs / gallop/rub  Abdomen- soft,BS+,NT, no hepatosplenomegaly  Ext - no c/c/edema   Neuro- no focal deficits. Power 5/5 all extremities  Skin - warm,dry, no obvious rashes. Review of Data:   LABS:   Lab Results   Component Value Date/Time    WBC 8.5 12/22/2017 10:10 AM    HGB 14.6 12/22/2017 10:10 AM    HCT 44.2 12/22/2017 10:10 AM    PLATELET 886 76/25/5377 10:10 AM     Lab Results   Component Value Date/Time    Sodium 137 12/22/2017 10:10 AM    Potassium 4.0 12/22/2017 10:10 AM    Chloride 98 (L) 12/22/2017 10:10 AM    CO2 31 12/22/2017 10:10 AM    Glucose 161 (H) 12/22/2017 10:10 AM    BUN 11 12/22/2017 10:10 AM    Creatinine 0.81 12/22/2017 10:10 AM     Lab Results   Component Value Date/Time    Cholesterol, total 187 12/22/2017 10:10 AM    HDL Cholesterol 58 12/22/2017 10:10 AM    LDL, calculated 90.6 12/22/2017 10:10 AM    Triglyceride 192 (H) 12/22/2017 10:10 AM     No results found for: GPT        Impression / Plan:        ICD-10-CM ICD-9-CM    1. Type 2 diabetes mellitus with nephropathy (HCC) E11.21 250.40      583.81    2. Chronic midline low back pain without sciatica M54.5 724.2     G89.29 338.29    3. Essential hypertension I10 401.9    4. Hypercholesteremia E78.00 272.0    5. Other seasonal allergic rhinitis J30.2 477.8 fluticasone (FLONASE) 50 mcg/actuation nasal spray      cetirizine (ZYRTEC) 10 mg tablet   6. Gastroesophageal reflux disease without esophagitis K21.9 530.81 pantoprazole (PROTONIX) 40 mg granules for oral suspension   7. Chronic seasonal allergic rhinitis due to pollen J30.1 477.0      DM - A1c improved    Hyperchol - improved. DM/[place] - improved. Explained to patient risk benefits of the medications. Advised patient to stop meds if having any side effects. Pt verbalized understanding of the instructions. I have discussed the diagnosis with the patient and the intended plan as seen in the above orders. The patient has received an after-visit summary and questions were answered concerning future plans.   I have discussed medication side effects and warnings with the patient as well. I have reviewed the plan of care with the patient, accepted their input and they are in agreement with the treatment goals. Reviewed plan of care. Patient has provided input and agrees with goals.     Follow-up Disposition: Not on Nina Regan MD

## 2018-02-08 NOTE — PROGRESS NOTES
1. Have you been to the ER, urgent care clinic since your last visit? Hospitalized since your last visit? No    2. Have you seen or consulted any other health care providers outside of the [address] [place] [address] since your last visit? Include any pap smears or colon screening.  No

## 2018-02-08 NOTE — MR AVS SNAPSHOT
[address] [place] [address] [address] [id number] 
[phone] Patient: Yara Chahal MRN: [id number] :1960 Visit Information Date & Time Provider Department Dept. Phone Encounter #  
 2018  9:00 AM [address] RIKY Cantu, [facility] 6 [phone] [id number] Follow-up Instructions Return in about 3 months (around 2018). Upcoming Health Maintenance Date Due  
 EYE EXAM RETINAL OR DILATED Q1 3/8/1972 FOOT EXAM Q1 3/21/2018 MICROALBUMIN Q1 2018 MEDICARE YEARLY EXAM 2018 HEMOGLOBIN A1C Q6M 2018 LIPID PANEL Q1 2018 COLONOSCOPY 2022 DTaP/Tdap/Td series (2 - Td) 2025 Allergies as of 2018  Review Complete On: 2018 By: [id number] S Roz Cantu MD  
  
 Severity Noted Reaction Type Reactions Lisinopril  2016    Swelling Current Immunizations  Never Reviewed Name Date Influenza Vaccine 2015  9:38 AM  
 Influenza Vaccine (Quad) PF 2017  9:37 AM, 10/28/2016 Pneumococcal Polysaccharide (PPSV-23) 2016 Tdap 2015  9:39 AM  
  
 Not reviewed this visit You Were Diagnosed With   
  
 Codes Comments Type 2 diabetes mellitus with nephropathy ([facility]    -  Primary ICD-10-CM: E11.21 
ICD-9-CM: 250.40, 583.81 Chronic midline low back pain without sciatica     ICD-10-CM: M54.5, G89.29 ICD-9-CM: 724.2, 338.29 Essential hypertension     ICD-10-CM: I10 
ICD-9-CM: 401.9 Hypercholesteremia     ICD-10-CM: E78.00 ICD-9-CM: 272.0 Other seasonal allergic rhinitis     ICD-10-CM: J30.2 ICD-9-CM: 477.8 Gastroesophageal reflux disease without esophagitis     ICD-10-CM: K21.9 ICD-9-CM: 530.81 Chronic seasonal allergic rhinitis due to pollen     ICD-10-CM: J30.1 ICD-9-CM: 477.0 Left tennis elbow     ICD-10-CM: M77.12 
ICD-9-CM: 726.32 Vitals BP Pulse Temp Resp Height(growth percentile) Weight(growth percentile) (!) 144/91 90 97.9 °F (36.6 °C) (Oral) 20 5' 9\" (1.753 m) 201 lb 6.4 oz (91.4 kg) SpO2 BMI Smoking Status 95% 29.74 kg/m2 Current Every Day Smoker BMI and BSA Data Body Mass Index Body Surface Area  
 29.74 kg/m 2 2.11 m 2 Preferred Pharmacy Pharmacy Name Phone [facility] [address] [place] [address] [phone] Your Updated Medication List  
  
   
This list is accurate as of: 2/8/18 10:03 AM.  Always use your most recent med list.  
  
  
  
  
 albuterol 90 mcg/actuation inhaler Commonly known as:  PROVENTIL HFA, VENTOLIN HFA, PROAIR HFA Take 2 Puffs by inhalation every six (6) hours as needed for Wheezing. amLODIPine 10 mg tablet Commonly known as:  Cookie Boozer Take 10 mg by mouth daily. aspirin 81 mg chewable tablet Take 1 Tab by mouth daily. carboxymethylcellulose sodium 0.5 % Drop ophthalmic solution Commonly known as:  REFRESH TEARS Administer 1 Drop to both eyes two (2) times a day. cetirizine 10 mg tablet Commonly known as:  ZYRTEC Take 1 Tab by mouth daily. cholecalciferol 1,000 unit tablet Commonly known as:  VITAMIN D3 Take 1,000 Units by mouth daily. clobetasol 0.05 % ointment Commonly known as:  Lenora Comber Apply  to affected area two (2) times a day. clotrimazole 1 % topical cream  
Commonly known as:  Shimon Muzzy Apply  to affected area two (2) times a day. * fluticasone 50 mcg/actuation nasal spray Commonly known as:  [place] 2 Sprays by Both Nostrils route daily. * fluticasone 50 mcg/actuation nasal spray Commonly known as:  [place] 2 Sprays by Both Nostrils route daily. fluticasone-salmeterol 250-50 mcg/dose diskus inhaler Commonly known as:  ADVAIR Take 1 Puff by inhalation every twelve (12) hours. gabapentin 300 mg capsule Commonly known as:  NEURONTIN Take 1 Cap by mouth four (4) times daily. glipiZIDE 10 mg tablet Commonly known as:  Ples Wythe Take 10 mg by mouth three (3) times daily. hydroCHLOROthiazide 25 mg tablet Commonly known as:  HYDRODIURIL Take 1 Tab by mouth daily. losartan 50 mg tablet Commonly known as:  COZAAR Take  by mouth daily. metFORMIN 1,000 mg tablet Commonly known as:  GLUCOPHAGE Take 1,000 mg by mouth two (2) times daily (with meals). omega-3 fatty acids-vitamin e 1,000 mg Cap Commonly known as:  FISH OIL Take 1 Cap by mouth two (2) times a day. pantoprazole 40 mg granules for oral suspension Commonly known as:  PROTONIX Take 40 mg by mouth daily. polyethylene glycol 17 gram/dose powder Commonly known as:  Yvetta Slice Take 17 g by mouth daily. pravastatin 40 mg tablet Commonly known as:  PRAVACHOL Take 1.5 Tabs by mouth nightly. Take one-half tablet by mouth at bedtime  
  
 predniSONE 10 mg tablet Commonly known as:  DELTASONE  
4 per day x 3 days, then 3 per day x 3 days, then 2 per day x 3 days, then 1 per day x 3 days, then DC  
  
 propranolol  mg SR capsule Commonly known as:  INDERAL LA Take 120 mg by mouth daily. traMADol 50 mg tablet Commonly known as:  ULTRAM  
Take 1 Tab by mouth every eight (8) hours as needed for Pain. Max Daily Amount: 150 mg.  
  
 * Notice: This list has 2 medication(s) that are the same as other medications prescribed for you. Read the directions carefully, and ask your doctor or other care provider to review them with you. Prescriptions Sent to Pharmacy Refills  
 fluticasone (FLONASE) 50 mcg/actuation nasal spray 3 Si Sprays by Both Nostrils route daily. Class: Normal  
 Pharmacy: [facility] DR BERLIN ACEVEDO [address] [place] [address]. Ph #: [phone] Route:  Both Nostrils  
 cetirizine (ZYRTEC) 10 mg tablet 3  
 Sig: Take 1 Tab by mouth daily. Class: Normal  
 Pharmacy: [facility] DR STEINAEL PUNEET CURTIS [address] [facility], [address] [address]. Ph #: [phone] Route: Oral  
 pantoprazole (PROTONIX) 40 mg granules for oral suspension 3 Sig: Take 40 mg by mouth daily. Class: Normal  
 Pharmacy: [facility] DR STEINAEL PUNEET CURTIS [address] [facility], [address] [address]. Ph #: [phone] Route: Oral  
  
Follow-up Instructions Return in about 3 months (around 5/8/2018). Introducing [facility] & HE[facility] SERVICES! Dear Izabel Hernandez: Thank you for requesting a [employer] account. Our records indicate that you already have an active [employer] account. You can access your account anytime at [email or web address] Did you know that you can access your hospital and ER discharge instructions at any time in [employer]? You can also review all of your test results from your hospital stay or ER visit. Additional Information If you have questions, please visit the Frequently Asked Questions section of the [employer] website at [email or web address] Remember, [employer] is NOT to be used for urgent needs. For medical emergencies, dial 911. Now available from your iPhone and Android! Please provide this summary of care documentation to your next provider. Your primary care clinician is listed as Stefano Manual. If you have any questions after today's visit, please call [phone].

## 2018-02-09 PROBLEM — E11.40 TYPE 2 DIABETES MELLITUS WITH DIABETIC NEUROPATHY (HCC): Status: ACTIVE | Noted: 2018-02-09

## 2018-05-08 ENCOUNTER — OFFICE VISIT (OUTPATIENT)
Dept: FAMILY MEDICINE CLINIC | Age: 58
End: 2018-05-08

## 2018-05-08 ENCOUNTER — HOSPITAL ENCOUNTER (OUTPATIENT)
Dept: LAB | Age: 58
Discharge: HOME | End: 2018-05-08
Payer: MEDICARE

## 2018-05-08 VITALS
WEIGHT: 203 LBS | OXYGEN SATURATION: 92 % | BODY MASS INDEX: 30.07 KG/M2 | HEART RATE: 103 BPM | RESPIRATION RATE: 17 BRPM | TEMPERATURE: 98.3 F | HEIGHT: 69 IN | SYSTOLIC BLOOD PRESSURE: 142 MMHG | DIASTOLIC BLOOD PRESSURE: 92 MMHG

## 2018-05-08 DIAGNOSIS — J43.9 PULMONARY EMPHYSEMA, UNSPECIFIED EMPHYSEMA TYPE (HCC): ICD-10-CM

## 2018-05-08 DIAGNOSIS — E78.00 HYPERCHOLESTEREMIA: ICD-10-CM

## 2018-05-08 DIAGNOSIS — J30.2 OTHER SEASONAL ALLERGIC RHINITIS: ICD-10-CM

## 2018-05-08 DIAGNOSIS — R06.09 DOE (DYSPNEA ON EXERTION): Primary | ICD-10-CM

## 2018-05-08 DIAGNOSIS — E11.9 TYPE 2 DIABETES MELLITUS WITHOUT COMPLICATION, WITHOUT LONG-TERM CURRENT USE OF INSULIN (HCC): ICD-10-CM

## 2018-05-08 DIAGNOSIS — J43.8 OTHER EMPHYSEMA (HCC): ICD-10-CM

## 2018-05-08 DIAGNOSIS — I10 ESSENTIAL HYPERTENSION: ICD-10-CM

## 2018-05-08 DIAGNOSIS — G47.33 OSA (OBSTRUCTIVE SLEEP APNEA): ICD-10-CM

## 2018-05-08 LAB
ALBUMIN SERPL-MCNC: 3.4 G/DL (ref 3.4–5)
ALBUMIN/GLOB SERPL: 0.9 {RATIO} (ref 0.8–1.7)
ALP SERPL-CCNC: 78 U/L (ref 45–117)
ALT SERPL-CCNC: 18 U/L (ref 16–61)
ANION GAP SERPL CALC-SCNC: 7 MMOL/L (ref 3–18)
AST SERPL-CCNC: 15 U/L (ref 15–37)
BASOPHILS # BLD: 0 K/UL (ref 0–0.06)
BASOPHILS NFR BLD: 0 % (ref 0–2)
BILIRUB SERPL-MCNC: 0.4 MG/DL (ref 0.2–1)
BUN SERPL-MCNC: 9 MG/DL (ref 7–18)
BUN/CREAT SERPL: 9 (ref 12–20)
CALCIUM SERPL-MCNC: 9 MG/DL (ref 8.5–10.1)
CHLORIDE SERPL-SCNC: 103 MMOL/L (ref 100–108)
CHOLEST SERPL-MCNC: 195 MG/DL
CO2 SERPL-SCNC: 30 MMOL/L (ref 21–32)
CREAT SERPL-MCNC: 0.95 MG/DL (ref 0.6–1.3)
CREAT UR-MCNC: 260.78 MG/DL (ref 30–125)
DIFFERENTIAL METHOD BLD: ABNORMAL
EOSINOPHIL # BLD: 0.1 K/UL (ref 0–0.4)
EOSINOPHIL NFR BLD: 2 % (ref 0–5)
ERYTHROCYTE [DISTWIDTH] IN BLOOD BY AUTOMATED COUNT: 15.9 % (ref 11.6–14.5)
EST. AVERAGE GLUCOSE BLD GHB EST-MCNC: 189 MG/DL
GLOBULIN SER CALC-MCNC: 4 G/DL (ref 2–4)
GLUCOSE SERPL-MCNC: 126 MG/DL (ref 74–99)
HBA1C MFR BLD: 8.2 % (ref 4.2–5.6)
HCT VFR BLD AUTO: 46.3 % (ref 36–48)
HDLC SERPL-MCNC: 51 MG/DL (ref 40–60)
HDLC SERPL: 3.8 {RATIO} (ref 0–5)
HGB BLD-MCNC: 15 G/DL (ref 13–16)
LDLC SERPL CALC-MCNC: 98.4 MG/DL (ref 0–100)
LIPID PROFILE,FLP: ABNORMAL
LYMPHOCYTES # BLD: 3 K/UL (ref 0.9–3.6)
LYMPHOCYTES NFR BLD: 42 % (ref 21–52)
MCH RBC QN AUTO: 29.6 PG (ref 24–34)
MCHC RBC AUTO-ENTMCNC: 32.4 G/DL (ref 31–37)
MCV RBC AUTO: 91.5 FL (ref 74–97)
MICROALBUMIN UR-MCNC: 532.1 MG/DL (ref 0–3)
MICROALBUMIN/CREAT UR-RTO: 2040 MG/G (ref 0–30)
MONOCYTES # BLD: 0.5 K/UL (ref 0.05–1.2)
MONOCYTES NFR BLD: 7 % (ref 3–10)
NEUTS SEG # BLD: 3.4 K/UL (ref 1.8–8)
NEUTS SEG NFR BLD: 49 % (ref 40–73)
PLATELET # BLD AUTO: 264 K/UL (ref 135–420)
PMV BLD AUTO: 11.2 FL (ref 9.2–11.8)
POTASSIUM SERPL-SCNC: 4.6 MMOL/L (ref 3.5–5.5)
PROT SERPL-MCNC: 7.4 G/DL (ref 6.4–8.2)
RBC # BLD AUTO: 5.06 M/UL (ref 4.7–5.5)
SODIUM SERPL-SCNC: 140 MMOL/L (ref 136–145)
TRIGL SERPL-MCNC: 228 MG/DL (ref ?–150)
TSH SERPL DL<=0.05 MIU/L-ACNC: 0.61 UIU/ML (ref 0.36–3.74)
VLDLC SERPL CALC-MCNC: 45.6 MG/DL
WBC # BLD AUTO: 7.1 K/UL (ref 4.6–13.2)

## 2018-05-08 PROCEDURE — 80061 LIPID PANEL: CPT | Performed by: INTERNAL MEDICINE

## 2018-05-08 PROCEDURE — 85025 COMPLETE CBC W/AUTO DIFF WBC: CPT | Performed by: INTERNAL MEDICINE

## 2018-05-08 PROCEDURE — 83036 HEMOGLOBIN GLYCOSYLATED A1C: CPT | Performed by: INTERNAL MEDICINE

## 2018-05-08 PROCEDURE — 82570 ASSAY OF URINE CREATININE: CPT | Performed by: INTERNAL MEDICINE

## 2018-05-08 PROCEDURE — 84443 ASSAY THYROID STIM HORMONE: CPT | Performed by: INTERNAL MEDICINE

## 2018-05-08 PROCEDURE — 36415 COLL VENOUS BLD VENIPUNCTURE: CPT | Performed by: INTERNAL MEDICINE

## 2018-05-08 PROCEDURE — 80053 COMPREHEN METABOLIC PANEL: CPT | Performed by: INTERNAL MEDICINE

## 2018-05-08 RX ORDER — ALBUTEROL SULFATE 90 UG/1
2 AEROSOL, METERED RESPIRATORY (INHALATION)
Qty: 1 INHALER | Refills: 3 | Status: SHIPPED | OUTPATIENT
Start: 2018-05-08 | End: 2018-05-10 | Stop reason: SDUPTHER

## 2018-05-08 RX ORDER — FLUTICASONE PROPIONATE 50 MCG
2 SPRAY, SUSPENSION (ML) NASAL DAILY
Qty: 1 BOTTLE | Refills: 3 | Status: SHIPPED | OUTPATIENT
Start: 2018-05-08 | End: 2018-05-10 | Stop reason: SDUPTHER

## 2018-05-08 RX ORDER — LOSARTAN POTASSIUM 100 MG/1
100 TABLET ORAL DAILY
COMMUNITY
End: 2018-06-08

## 2018-05-08 RX ORDER — FLUTICASONE PROPIONATE AND SALMETEROL 250; 50 UG/1; UG/1
1 POWDER RESPIRATORY (INHALATION) EVERY 12 HOURS
Qty: 1 INHALER | Refills: 3 | Status: SHIPPED | OUTPATIENT
Start: 2018-05-08 | End: 2018-05-10 | Stop reason: SDUPTHER

## 2018-05-08 NOTE — PROGRESS NOTES
Hannah Sheffield is a 66 y.o.  male and presents with     Chief Complaint   Patient presents with    Diabetes    Shortness of Breath    Hypertension    Leg Swelling    Constipation       Pt says he has been having SAAVEDRA. Pt also has some swelling in his legs. Pt is not using his inhalers. He still smokes cigarettes. Pt thinks his sugars are doing okay. He also sees a PCP at the [place]. Past Medical History:   Diagnosis Date    Back pain     Chronic obstructive pulmonary disease ([facility]     Diabetes ([facility]     Hypercholesterolemia     Hypertension     Sleep apnea     Does not use CPAP     Past Surgical History:   Procedure Laterality Date    COLONOSCOPY N/A 1/23/2017    COLONOSCOPY performed by Frank Mcfadden MD at [address] [place] [address] [address]  2003    umbilacal     HX SHOULDER ARTHROSCOPY  2004    bilateral     Current Outpatient Prescriptions   Medication Sig    losartan (COZAAR) 100 mg tablet Take 100 mg by mouth daily.  fluticasone-salmeterol (ADVAIR DISKUS) 250-50 mcg/dose diskus inhaler Take 1 Puff by inhalation every twelve (12) hours.  fluticasone (FLONASE) 50 mcg/actuation nasal spray 2 Sprays by Both Nostrils route daily.  albuterol (PROVENTIL HFA, VENTOLIN HFA, PROAIR HFA) 90 mcg/actuation inhaler Take 2 Puffs by inhalation every six (6) hours as needed for Wheezing.  cetirizine (ZYRTEC) 10 mg tablet Take 1 Tab by mouth daily.  pantoprazole (PROTONIX) 40 mg granules for oral suspension Take 40 mg by mouth daily.  traMADol (ULTRAM) 50 mg tablet Take 1 Tab by mouth every eight (8) hours as needed for Pain. Max Daily Amount: 150 mg.    aspirin 81 mg chewable tablet Take 1 Tab by mouth daily.  gabapentin (NEURONTIN) 300 mg capsule Take 1 Cap by mouth four (4) times daily.  hydroCHLOROthiazide (HYDRODIURIL) 25 mg tablet Take 1 Tab by mouth daily.  pravastatin (PRAVACHOL) 40 mg tablet Take 1.5 Tabs by mouth nightly.  Take one-half tablet by mouth at bedtime    cholecalciferol (VITAMIN D3) 1,000 unit tablet Take 1,000 Units by mouth daily.  metFORMIN (GLUCOPHAGE) 1,000 mg tablet Take 1,000 mg by mouth two (2) times daily (with meals).  propranolol LA (INDERAL LA) 120 mg SR capsule Take 120 mg by mouth daily.  glipiZIDE (GLUCOTROL) 10 mg tablet Take 10 mg by mouth three (3) times daily.  amLODIPine (NORVASC) 10 mg tablet Take 10 mg by mouth daily.  clotrimazole (LOTRIMIN) 1 % topical cream Apply  to affected area two (2) times a day.  carboxymethylcellulose sodium (REFRESH TEARS) 0.5 % drop ophthalmic solution Administer 1 Drop to both eyes two (2) times a day.  clobetasol (TEMOVATE) 0.05 % ointment Apply  to affected area two (2) times a day.  omega-3 fatty acids-vitamin e (FISH OIL) 1,000 mg cap Take 1 Cap by mouth two (2) times a day. No current facility-administered medications for this visit. Health Maintenance   Topic Date Due    EYE EXAM RETINAL OR DILATED Q1  03/08/1972    FOOT EXAM Q1  03/21/2018    MICROALBUMIN Q1  04/19/2018    MEDICARE YEARLY EXAM  04/20/2018    HEMOGLOBIN A1C Q6M  06/22/2018    Influenza Age 9 to Adult  08/01/2018    LIPID PANEL Q1  12/22/2018    COLONOSCOPY  01/23/2022    DTaP/Tdap/Td series (2 - Td) 09/17/2025    Hepatitis C Screening  Completed    Pneumococcal 19-64 Medium Risk  Completed     Immunization History   Administered Date(s) Administered    Influenza Vaccine 09/17/2015    Influenza Vaccine (Quad) PF 10/28/2016, 12/22/2017    Pneumococcal Polysaccharide (PPSV-23) 07/27/2016    Tdap 09/17/2015     No LMP for male patient. Allergies and Intolerances: Allergies   Allergen Reactions    Lisinopril Swelling       Family History:   Family History   Problem Relation Age of Onset    Cancer Mother     Hypertension Mother     Heart Disease Mother     Diabetes Mother     No Known Problems Father        Social History:   He  reports that he has been smoking Cigarettes. He has a 12.50 pack-year smoking history. He quit smokeless tobacco use about 2 years ago. He  reports that he drinks about 2.4 oz of alcohol per week             Review of Systems: pos for SAAVEDRA, pos for leg swelling  General: negative for - chills, fatigue, fever, weight change  Psych: negative for - anxiety, depression, irritability or mood swings  ENT: negative for - headaches, hearing change, nasal congestion, oral lesions, sneezing or sore throat  Heme/ Lymph: negative for - bleeding problems, bruising, pallor or swollen lymph nodes  Endo: negative for - hot flashes, polydipsia/polyuria or temperature intolerance  Resp: negative for - cough, shortness of breath or wheezing  CV: negative for - chest pain, edema or palpitations  GI: negative for - abdominal pain, change in bowel habits, constipation, diarrhea or nausea/vomiting  : negative for - dysuria, hematuria, incontinence, pelvic pain or vulvar/vaginal symptoms  MSK: negative for - joint pain, joint swelling or muscle pain  Neuro: negative for - confusion, headaches, seizures or weakness  Derm: negative for - dry skin, hair changes, rash or skin lesion changes          Physical:   Vitals:   Vitals:    05/08/18 0938   BP: (!) 142/92   Pulse: (!) 103   Resp: 17   Temp: 98.3 °F (36.8 °C)   TempSrc: Oral   SpO2: 92%   Weight: 203 lb (92.1 kg)   Height: 5' 9\" (1.753 m)           Exam:   HEENT- atraumatic,normocephalic, awake, oriented, well nourished  Neck - supple,no enlarged lymph nodes, no JVD, no thyromegaly  Chest- CTA, no rhonchi, no crackles  Heart- rrr, no murmurs / gallop/rub  Abdomen- soft,BS+,NT, no hepatosplenomegaly  Ext - trace edema   Neuro- no focal deficits. Power 5/5 all extremities  Skin - warm,dry, no obvious rashes.           Review of Data:   LABS:   Lab Results   Component Value Date/Time    WBC 8.5 12/22/2017 10:10 AM    HGB 14.6 12/22/2017 10:10 AM    HCT 44.2 12/22/2017 10:10 AM    PLATELET 561 32/20/8250 10:10 AM     Lab Results Component Value Date/Time    Sodium 137 12/22/2017 10:10 AM    Potassium 4.0 12/22/2017 10:10 AM    Chloride 98 (L) 12/22/2017 10:10 AM    CO2 31 12/22/2017 10:10 AM    Glucose 161 (H) 12/22/2017 10:10 AM    BUN 11 12/22/2017 10:10 AM    Creatinine 0.81 12/22/2017 10:10 AM     Lab Results   Component Value Date/Time    Cholesterol, total 187 12/22/2017 10:10 AM    HDL Cholesterol 58 12/22/2017 10:10 AM    LDL, calculated 90.6 12/22/2017 10:10 AM    Triglyceride 192 (H) 12/22/2017 10:10 AM     No results found for: GPT        Impression / Plan:        ICD-10-CM ICD-9-CM    1. SAAVEDRA (dyspnea on exertion) R06.09 786.09 2D ECHO COMPLETE ADULT (TTE) W OR WO CONTR   2. Pulmonary emphysema, unspecified emphysema type ([address] J43.9 492.8 fluticasone-salmeterol (ADVAIR DISKUS) 250-50 mcg/dose diskus inhaler   3. Other seasonal allergic rhinitis J30.2 477.8 fluticasone (FLONASE) 50 mcg/actuation nasal spray   4. Other emphysema ([address] J43.8 492.8 albuterol (PROVENTIL HFA, VENTOLIN HFA, PROAIR HFA) 90 mcg/actuation inhaler   5. Essential hypertension I10 401.9 2D ECHO COMPLETE ADULT (TTE) W OR WO CONTR   6. Type 2 diabetes mellitus without complication, without long-term current use of insulin (HCC) E11.9 250.00 TSH 3RD GENERATION      HEMOGLOBIN A1C WITH EAG      MICROALBUMIN, UR, RAND W/ MICROALB/CREAT RATIO      METABOLIC PANEL, COMPREHENSIVE      LIPID PANEL      CBC WITH AUTOMATED DIFF   7. Hypercholesteremia E78.00 272.0    8. JAIMEE (obstructive sleep apnea) G47.33 327.23            Pt needs to See Dr Jez Grant for colonoscopy not done in 2017. Stop smoking        Explained to patient risk benefits of the medications. Advised patient to stop meds if having any side effects. Pt verbalized understanding of the instructions. I have discussed the diagnosis with the patient and the intended plan as seen in the above orders. The patient has received an after-visit summary and questions were answered concerning future plans.   I have discussed medication side effects and warnings with the patient as well. I have reviewed the plan of care with the patient, accepted their input and they are in agreement with the treatment goals. Reviewed plan of care. Patient has provided input and agrees with goals. Follow-up Disposition:  Return in about 1 month (around 6/8/2018) for Medicare wellness.     Sagar Bailey MD

## 2018-05-08 NOTE — PROGRESS NOTES
1. Have you been to the ER, urgent care clinic since your last visit? Hospitalized since your last visit? No    2. Have you seen or consulted any other health care providers outside of the [address] [place] [address] since your last visit? Include any pap smears or colon screening.  No

## 2018-05-08 NOTE — MR AVS SNAPSHOT
[address] [address] 
[phone] Patient: Lesa Lefort MRN: [id number] :1960 Visit Information Date & Time Provider Department Dept. Phone Encounter #  
 2018  9:45 AM Conrad Christensen [facility] [phone] [id number] Follow-up Instructions Return in about 1 month (around 2018) for Medicare wellness. Upcoming Health Maintenance Date Due  
 EYE EXAM RETINAL OR DILATED Q1 3/8/1972 FOOT EXAM Q1 3/21/2018 MICROALBUMIN Q1 2018 MEDICARE YEARLY EXAM 2018 HEMOGLOBIN A1C Q6M 2018 Influenza Age 5 to Adult 2018 LIPID PANEL Q1 2018 COLONOSCOPY 2022 DTaP/Tdap/Td series (2 - Td) 2025 Allergies as of 2018  Review Complete On: 2018 By: Edilson Smith LPN Severity Noted Reaction Type Reactions Lisinopril  2016    Swelling Current Immunizations  Never Reviewed Name Date Influenza Vaccine 2015  9:38 AM  
 Influenza Vaccine (Quad) PF 2017  9:37 AM, 10/28/2016 Pneumococcal Polysaccharide (PPSV-23) 2016 Tdap 2015  9:39 AM  
  
 Not reviewed this visit You Were Diagnosed With   
  
 Codes Comments SAAVEDRA (dyspnea on exertion)    -  Primary ICD-10-CM: R06.09 
ICD-9-CM: 786.09   
 Pulmonary emphysema, unspecified emphysema type ([facility]     ICD-10-CM: J43.9 ICD-9-CM: 492.8 Other seasonal allergic rhinitis     ICD-10-CM: J30.2 ICD-9-CM: 477.8 Other emphysema ([facility]     ICD-10-CM: J43.8 ICD-9-CM: 492.8 Essential hypertension     ICD-10-CM: I10 
ICD-9-CM: 401.9 Type 2 diabetes mellitus without complication, without long-term current use of insulin (HCC)     ICD-10-CM: E11.9 ICD-9-CM: 250.00 Hypercholesteremia     ICD-10-CM: E78.00 ICD-9-CM: 272.0   
 JAIMEE (obstructive sleep apnea)     ICD-10-CM: G47.33 
ICD-9-CM: 327.23 Vitals BP Pulse Temp Resp Height(growth percentile) Weight(growth percentile) (!) 142/92 (!) 103 98.3 °F (36.8 °C) (Oral) 17 5' 9\" (1.753 m) 203 lb (92.1 kg) SpO2 BMI Smoking Status 92% 29.98 kg/m2 Current Every Day Smoker Vitals History BMI and BSA Data Body Mass Index Body Surface Area  
 29.98 kg/m 2 2.12 m 2 Preferred Pharmacy Pharmacy Name Phone [facility], [address] [phone] Your Updated Medication List  
  
   
This list is accurate as of 5/8/18 10:39 AM.  Always use your most recent med list.  
  
  
  
  
 albuterol 90 mcg/actuation inhaler Commonly known as:  PROVENTIL HFA, VENTOLIN HFA, PROAIR HFA Take 2 Puffs by inhalation every six (6) hours as needed for Wheezing. amLODIPine 10 mg tablet Commonly known as:  [place] Take 10 mg by mouth daily. aspirin 81 mg chewable tablet Take 1 Tab by mouth daily. carboxymethylcellulose sodium 0.5 % Drop ophthalmic solution Commonly known as:  REFRESH TEARS Administer 1 Drop to both eyes two (2) times a day. cetirizine 10 mg tablet Commonly known as:  ZYRTEC Take 1 Tab by mouth daily. cholecalciferol 1,000 unit tablet Commonly known as:  VITAMIN D3 Take 1,000 Units by mouth daily. clobetasol 0.05 % ointment Commonly known as:  Carlena Kenning Apply  to affected area two (2) times a day. clotrimazole 1 % topical cream  
Commonly known as:  Kvng Enter Apply  to affected area two (2) times a day. fluticasone 50 mcg/actuation nasal spray Commonly known as:  Beverlyn Maker 2 Sprays by Both Nostrils route daily. fluticasone-salmeterol 250-50 mcg/dose diskus inhaler Commonly known as:  ADVAIR DISKUS Take 1 Puff by inhalation every twelve (12) hours. gabapentin 300 mg capsule Commonly known as:  NEURONTIN Take 1 Cap by mouth four (4) times daily. glipiZIDE 10 mg tablet Commonly known as:  Pilar Craw Take 10 mg by mouth three (3) times daily. hydroCHLOROthiazide 25 mg tablet Commonly known as:  HYDRODIURIL Take 1 Tab by mouth daily. losartan 100 mg tablet Commonly known as:  COZAAR Take 100 mg by mouth daily. metFORMIN 1,000 mg tablet Commonly known as:  GLUCOPHAGE Take 1,000 mg by mouth two (2) times daily (with meals). omega-3 fatty acids-vitamin e 1,000 mg Cap Commonly known as:  FISH OIL Take 1 Cap by mouth two (2) times a day. pantoprazole 40 mg granules for oral suspension Commonly known as:  PROTONIX Take 40 mg by mouth daily. pravastatin 40 mg tablet Commonly known as:  PRAVACHOL Take 1.5 Tabs by mouth nightly. Take one-half tablet by mouth at bedtime  
  
 propranolol  mg SR capsule Commonly known as:  INDERAL LA Take 120 mg by mouth daily. traMADol 50 mg tablet Commonly known as:  ULTRAM  
Take 1 Tab by mouth every eight (8) hours as needed for Pain. Max Daily Amount: 150 mg.  
  
  
  
  
Prescriptions Sent to Pharmacy Refills  
 fluticasone-salmeterol (ADVAIR DISKUS) 250-50 mcg/dose diskus inhaler 3 Sig: Take 1 Puff by inhalation every twelve (12) hours. Class: Normal  
 Pharmacy: [address] Ph #: [phone] Route: Inhalation  
 fluticasone (FLONASE) 50 mcg/actuation nasal spray 3 Si Sprays by Both Nostrils route daily. Class: Normal  
 Pharmacy: [address] Ph #: [phone] Route: Both Nostrils  
 albuterol (PROVENTIL HFA, VENTOLIN HFA, PROAIR HFA) 90 mcg/actuation inhaler 3 Sig: Take 2 Puffs by inhalation every six (6) hours as needed for Wheezing. Class: Normal  
 Pharmacy: [address] Ph #: [phone] Route: Inhalation Follow-up Instructions Return in about 1 month (around 6/8/2018) for Medicare wellness. To-Do List   
 05/08/2018 ECHO:  2D ECHO COMPLETE ADULT (TTE) W OR WO CONTR   
  
 05/08/2018 Lab:  CBC WITH AUTOMATED DIFF   
  
 05/08/2018 Lab:  HEMOGLOBIN A1C WITH EAG   
  
 05/08/2018 Lab:  LIPID PANEL   
  
 05/08/2018 Lab:  METABOLIC PANEL, COMPREHENSIVE   
  
 05/08/2018 Lab:  MICROALBUMIN, UR, RAND W/ MICROALB/CREAT RATIO   
  
 05/08/2018 Lab:  TSH 3RD GENERATION Introducing [facility]! Dear Saul Robb: Thank you for requesting a [facility] account. Our records indicate that you already have an active [facility] account. You can access your account anytime at [email or web address] [email or web address]/"[email or web address] Did you know that you can access your hospital and ER discharge instructions at any time in [facility]? You can also review all of your test results from your hospital stay or ER visit. Additional Information If you have questions, please visit the Frequently Asked Questions section of the [employer] website at [email or web address] [email or web address]/"[email or web address]/. Remember, [employer] [facility] is NOT to be used for urgent needs. For medical emergencies, dial 911. Now available from your iPhone and Android! Please provide this summary of care documentation to your next provider. Your primary care clinician is listed as Ector Almanza. If you have any questions after today's visit, please call [phone].

## 2018-05-09 ENCOUNTER — TELEPHONE (OUTPATIENT)
Dept: FAMILY MEDICINE CLINIC | Age: 58
End: 2018-05-09

## 2018-05-09 DIAGNOSIS — R80.9 PROTEINURIA, UNSPECIFIED TYPE: Primary | ICD-10-CM

## 2018-05-09 NOTE — TELEPHONE ENCOUNTER
Urine micro albumin shows significant proteinuria. Will order 24 hour urine protein. Nurse called [phone] message  Nurse called [phone] number not in service.

## 2018-05-09 NOTE — LETTER
5/14/2018 1:59 PM 
 
Mr. Chan Denise [address] Dear Mr. De Lunaa Sites, We have been unable to reach you by phone to notify you of your test results. Please call our office at [phone] and ask to speak with my nurse in order to explain these results to you and advise you of any recommendations.  
 
 
 
Sincerely, 
 
 
Navin Jack MD

## 2018-05-10 ENCOUNTER — TELEPHONE (OUTPATIENT)
Dept: FAMILY MEDICINE CLINIC | Age: 58
End: 2018-05-10

## 2018-05-10 DIAGNOSIS — J43.8 OTHER EMPHYSEMA (HCC): ICD-10-CM

## 2018-05-10 DIAGNOSIS — J30.2 OTHER SEASONAL ALLERGIC RHINITIS: ICD-10-CM

## 2018-05-10 DIAGNOSIS — J43.9 PULMONARY EMPHYSEMA, UNSPECIFIED EMPHYSEMA TYPE (HCC): ICD-10-CM

## 2018-05-10 RX ORDER — FLUTICASONE PROPIONATE 50 MCG
2 SPRAY, SUSPENSION (ML) NASAL DAILY
Qty: 1 BOTTLE | Refills: 3 | Status: CANCELLED | OUTPATIENT
Start: 2018-05-10

## 2018-05-10 RX ORDER — FLUTICASONE PROPIONATE AND SALMETEROL 250; 50 UG/1; UG/1
1 POWDER RESPIRATORY (INHALATION) EVERY 12 HOURS
Qty: 1 INHALER | Refills: 3 | Status: CANCELLED | OUTPATIENT
Start: 2018-05-10

## 2018-05-10 RX ORDER — FLUTICASONE PROPIONATE AND SALMETEROL 250; 50 UG/1; UG/1
1 POWDER RESPIRATORY (INHALATION) EVERY 12 HOURS
Qty: 1 INHALER | Refills: 3 | Status: SHIPPED | OUTPATIENT
Start: 2018-05-10 | End: 2018-05-14 | Stop reason: SDUPTHER

## 2018-05-10 RX ORDER — ALBUTEROL SULFATE 90 UG/1
2 AEROSOL, METERED RESPIRATORY (INHALATION)
Qty: 1 INHALER | Refills: 3 | Status: SHIPPED | OUTPATIENT
Start: 2018-05-10 | End: 2018-05-14 | Stop reason: SDUPTHER

## 2018-05-10 RX ORDER — ALBUTEROL SULFATE 90 UG/1
2 AEROSOL, METERED RESPIRATORY (INHALATION)
Qty: 1 INHALER | Refills: 3 | Status: CANCELLED | OUTPATIENT
Start: 2018-05-10

## 2018-05-10 RX ORDER — FLUTICASONE PROPIONATE 50 MCG
2 SPRAY, SUSPENSION (ML) NASAL DAILY
Qty: 1 BOTTLE | Refills: 3 | Status: SHIPPED | OUTPATIENT
Start: 2018-05-10 | End: 2018-05-14 | Stop reason: SDUPTHER

## 2018-05-10 NOTE — TELEPHONE ENCOUNTER
Requested Prescriptions     Pending Prescriptions Disp Refills    fluticasone (FLONASE) 50 mcg/actuation nasal spray 1 Bottle 3     Si Sprays by Both Nostrils route daily.  fluticasone-salmeterol (ADVAIR DISKUS) 250-50 mcg/dose diskus inhaler 1 Inhaler 3     Sig: Take 1 Puff by inhalation every twelve (12) hours.  albuterol (PROVENTIL HFA, VENTOLIN HFA, PROAIR HFA) 90 mcg/actuation inhaler 1 Inhaler 3     Sig: Take 2 Puffs by inhalation every six (6) hours as needed for Wheezing. Patient requesting these meds go to [facility], pharmacy already changed. Thank you.

## 2018-05-10 NOTE — TELEPHONE ENCOUNTER
Pt. Would like prescriptions to be sent to [facility] instead of [address] [place] [address]. Pharmacy is updated in [facility].

## 2018-05-14 DIAGNOSIS — J30.2 OTHER SEASONAL ALLERGIC RHINITIS: ICD-10-CM

## 2018-05-14 DIAGNOSIS — J43.8 OTHER EMPHYSEMA (HCC): ICD-10-CM

## 2018-05-14 DIAGNOSIS — J43.9 PULMONARY EMPHYSEMA, UNSPECIFIED EMPHYSEMA TYPE (HCC): ICD-10-CM

## 2018-05-14 RX ORDER — ALBUTEROL SULFATE 90 UG/1
2 AEROSOL, METERED RESPIRATORY (INHALATION)
Qty: 1 INHALER | Refills: 3 | Status: SHIPPED | OUTPATIENT
Start: 2018-05-14 | End: 2018-09-11 | Stop reason: SDUPTHER

## 2018-05-14 RX ORDER — FLUTICASONE PROPIONATE 50 MCG
2 SPRAY, SUSPENSION (ML) NASAL DAILY
Qty: 1 BOTTLE | Refills: 3 | Status: SHIPPED | OUTPATIENT
Start: 2018-05-14 | End: 2019-03-21

## 2018-05-14 RX ORDER — FLUTICASONE PROPIONATE AND SALMETEROL 250; 50 UG/1; UG/1
1 POWDER RESPIRATORY (INHALATION) EVERY 12 HOURS
Qty: 1 INHALER | Refills: 3 | Status: SHIPPED | OUTPATIENT
Start: 2018-05-14 | End: 2018-09-11 | Stop reason: SDUPTHER

## 2018-05-22 NOTE — TELEPHONE ENCOUNTER
Patient responded to lab letter. Pt notified to  24 hour urine collection. Pt states he will  by 05/22/2018. This encounter will be closed.

## 2018-05-29 ENCOUNTER — TELEPHONE (OUTPATIENT)
Dept: FAMILY MEDICINE CLINIC | Age: 58
End: 2018-05-29

## 2018-05-29 DIAGNOSIS — Z12.11 COLON CANCER SCREENING: Primary | ICD-10-CM

## 2018-05-29 NOTE — TELEPHONE ENCOUNTER
Patient said he was suppose to be referred to get a colonoscopy. I do not see a referral in the system. Patient also wanted to know if he can get a tramadol refill now without being see, as he is coming to be seen in two weeks already. Patient states he didn't mention the tramadol at last visit because he wasn't entirely out yet.

## 2018-05-31 ENCOUNTER — HOSPITAL ENCOUNTER (OUTPATIENT)
Dept: LAB | Age: 58
Discharge: HOME | End: 2018-05-31
Payer: MEDICARE

## 2018-05-31 PROCEDURE — 84156 ASSAY OF PROTEIN URINE: CPT | Performed by: INTERNAL MEDICINE

## 2018-06-01 ENCOUNTER — HOSPITAL ENCOUNTER (OUTPATIENT)
Dept: LAB | Age: 58
Discharge: HOME | End: 2018-06-01

## 2018-06-01 DIAGNOSIS — R80.9 PROTEINURIA, UNSPECIFIED TYPE: ICD-10-CM

## 2018-06-01 LAB
COLLECT DURATION TIME UR: 24 HR
PROT 24H UR-MRATE: 3192 MG/24HR
SPECIMEN VOL ?TM UR: 1580 ML

## 2018-06-04 ENCOUNTER — HOSPITAL ENCOUNTER (OUTPATIENT)
Dept: NON INVASIVE DIAGNOSTICS | Age: 58
Discharge: HOME | End: 2018-06-04
Attending: INTERNAL MEDICINE
Payer: MEDICARE

## 2018-06-04 DIAGNOSIS — I10 ESSENTIAL HYPERTENSION: ICD-10-CM

## 2018-06-04 DIAGNOSIS — R06.09 DOE (DYSPNEA ON EXERTION): ICD-10-CM

## 2018-06-04 PROCEDURE — 93306 TTE W/DOPPLER COMPLETE: CPT

## 2018-06-04 NOTE — TELEPHONE ENCOUNTER
Will ask pt to pre pone the appt to the 5 th , I can refill trmadol then. Spoke with patient, he will wait until 06/08/2018. This encounter will be closed.

## 2018-06-08 ENCOUNTER — TELEPHONE (OUTPATIENT)
Dept: FAMILY MEDICINE CLINIC | Age: 58
End: 2018-06-08

## 2018-06-08 ENCOUNTER — OFFICE VISIT (OUTPATIENT)
Dept: FAMILY MEDICINE CLINIC | Age: 58
End: 2018-06-08

## 2018-06-08 VITALS
OXYGEN SATURATION: 92 % | HEIGHT: 69 IN | DIASTOLIC BLOOD PRESSURE: 83 MMHG | BODY MASS INDEX: 29.92 KG/M2 | RESPIRATION RATE: 16 BRPM | SYSTOLIC BLOOD PRESSURE: 131 MMHG | HEART RATE: 99 BPM | TEMPERATURE: 97.7 F | WEIGHT: 202 LBS

## 2018-06-08 DIAGNOSIS — I10 ESSENTIAL HYPERTENSION: ICD-10-CM

## 2018-06-08 DIAGNOSIS — R06.09 DOE (DYSPNEA ON EXERTION): ICD-10-CM

## 2018-06-08 DIAGNOSIS — L03.031 INFECTED NAILBED OF TOE, RIGHT: ICD-10-CM

## 2018-06-08 DIAGNOSIS — N04.9 NEPHROTIC SYNDROME: Primary | ICD-10-CM

## 2018-06-08 DIAGNOSIS — M79.89 LEG SWELLING: ICD-10-CM

## 2018-06-08 DIAGNOSIS — Z00.00 MEDICARE ANNUAL WELLNESS VISIT, SUBSEQUENT: ICD-10-CM

## 2018-06-08 DIAGNOSIS — I42.9 CARDIOMYOPATHY, UNSPECIFIED TYPE (HCC): ICD-10-CM

## 2018-06-08 DIAGNOSIS — M54.41 MIDLINE LOW BACK PAIN WITH RIGHT-SIDED SCIATICA, UNSPECIFIED CHRONICITY: ICD-10-CM

## 2018-06-08 DIAGNOSIS — L60.0 INGROWN NAIL OF GREAT TOE OF RIGHT FOOT: ICD-10-CM

## 2018-06-08 DIAGNOSIS — E11.9 TYPE 2 DIABETES MELLITUS WITHOUT COMPLICATION, UNSPECIFIED WHETHER LONG TERM INSULIN USE (HCC): ICD-10-CM

## 2018-06-08 RX ORDER — LOSARTAN POTASSIUM 50 MG/1
50 TABLET ORAL DAILY
Qty: 30 TAB | Refills: 3 | Status: SHIPPED | OUTPATIENT
Start: 2018-06-08

## 2018-06-08 RX ORDER — FUROSEMIDE 40 MG/1
40 TABLET ORAL DAILY
Qty: 30 TAB | Refills: 3 | Status: SHIPPED | OUTPATIENT
Start: 2018-06-08 | End: 2018-07-12 | Stop reason: SDUPTHER

## 2018-06-08 RX ORDER — CEPHALEXIN 500 MG/1
500 CAPSULE ORAL 4 TIMES DAILY
Qty: 40 CAP | Refills: 0 | Status: SHIPPED | OUTPATIENT
Start: 2018-06-08 | End: 2018-06-18

## 2018-06-08 RX ORDER — CARVEDILOL 6.25 MG/1
6.25 TABLET ORAL 2 TIMES DAILY
Qty: 60 TAB | Refills: 3 | Status: SHIPPED | OUTPATIENT
Start: 2018-06-08 | End: 2018-07-12 | Stop reason: DRUGHIGH

## 2018-06-08 RX ORDER — TRAMADOL HYDROCHLORIDE 50 MG/1
50 TABLET ORAL
Qty: 40 TAB | Refills: 0 | Status: SHIPPED | OUTPATIENT
Start: 2018-06-08 | End: 2018-09-11 | Stop reason: SDUPTHER

## 2018-06-08 NOTE — MR AVS SNAPSHOT
[address] [place] [id number] 
[phone] Patient: Isra Moseley MRN: [id number] :1960 Visit Information Date & Time Provider Department Dept. Phone Encounter #  
 2018  9:45 AM [id number] RIKY Cantu, [facility] [phone] [id number] Follow-up Instructions Return in about 1 month (around 2018). Upcoming Health Maintenance Date Due  
 EYE EXAM RETINAL OR DILATED Q1 3/8/1972 FOOT EXAM Q1 3/21/2018 MEDICARE YEARLY EXAM 2018 Influenza Age 5 to Adult 2018 HEMOGLOBIN A1C Q6M 2018 MICROALBUMIN Q1 2019 LIPID PANEL Q1 2019 COLONOSCOPY 2022 DTaP/Tdap/Td series (2 - Td) 2025 Allergies as of 2018  Review Complete On: 2018 By: Francisca Roberts LPN Severity Noted Reaction Type Reactions Lisinopril  2016    Swelling Current Immunizations  Never Reviewed Name Date Influenza Vaccine 2015  9:38 AM  
 Influenza Vaccine (Quad) PF 2017  9:37 AM, 10/28/2016 Pneumococcal Polysaccharide (PPSV-23) 2016 Tdap 2015  9:39 AM  
  
 Not reviewed this visit You Were Diagnosed With   
  
 Codes Comments Nephrotic syndrome    -  Primary ICD-10-CM: N04.9 ICD-9-CM: 321. 9 Type 2 diabetes mellitus without complication, unspecified whether long term insulin use (HCC)     ICD-10-CM: E11.9 ICD-9-CM: 250.00 Midline low back pain with right-sided sciatica, unspecified chronicity     ICD-10-CM: M54.41 
ICD-9-CM: 724.3 Leg swelling     ICD-10-CM: M79.89 ICD-9-CM: 729.81   
 SAAVEDRA (dyspnea on exertion)     ICD-10-CM: R06.09 
ICD-9-CM: 786.09 Cardiomyopathy, unspecified type ([facility]     ICD-10-CM: I42.9 ICD-9-CM: 425.4 Essential hypertension     ICD-10-CM: I10 
ICD-9-CM: 401.9 Vitals BP Pulse Temp Resp Height(growth percentile) Weight(growth percentile) 131/83 99 97.7 °F (36.5 °C) (Oral) 16 5' 9\" (1.753 m) 202 lb (91.6 kg) SpO2 BMI Smoking Status 92% 29.83 kg/m2 Current Every Day Smoker Vitals History BMI and BSA Data Body Mass Index Body Surface Area  
 29.83 kg/m 2 2.11 m 2 Preferred Pharmacy Pharmacy Name Phone [facility] [address] [place] [address] [phone] Your Updated Medication List  
  
   
This list is accurate as of 6/8/18 10:23 AM.  Always use your most recent med list.  
  
  
  
  
 albuterol 90 mcg/actuation inhaler Commonly known as:  PROVENTIL HFA, VENTOLIN HFA, PROAIR HFA Take 2 Puffs by inhalation every six (6) hours as needed for Wheezing. amLODIPine 10 mg tablet Commonly known as:  Mendoza Inks Take 10 mg by mouth daily. aspirin 81 mg chewable tablet Take 1 Tab by mouth daily. carboxymethylcellulose sodium 0.5 % Drop ophthalmic solution Commonly known as:  REFRESH TEARS Administer 1 Drop to both eyes two (2) times a day. carvedilol 6.25 mg tablet Commonly known as:  Justino Heal Take 1 Tab by mouth two (2) times a day. cetirizine 10 mg tablet Commonly known as:  ZYRTEC Take 1 Tab by mouth daily. cholecalciferol 1,000 unit tablet Commonly known as:  VITAMIN D3 Take 1,000 Units by mouth daily. clobetasol 0.05 % ointment Commonly known as:  Wynelle Craze Apply  to affected area two (2) times a day. clotrimazole 1 % topical cream  
Commonly known as:  Quenten Purl Apply  to affected area two (2) times a day. fluticasone 50 mcg/actuation nasal spray Commonly known as:  Merrill Gu 2 Sprays by Both Nostrils route daily. fluticasone-salmeterol 250-50 mcg/dose diskus inhaler Commonly known as:  ADVAIR DISKUS Take 1 Puff by inhalation every twelve (12) hours. furosemide 40 mg tablet Commonly known as:  LASIX Take 1 Tab by mouth daily. gabapentin 300 mg capsule Commonly known as:  NEURONTIN Take 1 Cap by mouth four (4) times daily. glipiZIDE 10 mg tablet Commonly known as:  Marandaa Mekhi Take 10 mg by mouth three (3) times daily. hydroCHLOROthiazide 25 mg tablet Commonly known as:  HYDRODIURIL Take 1 Tab by mouth daily. losartan 50 mg tablet Commonly known as:  COZAAR Take 1 Tab by mouth daily. metFORMIN 1,000 mg tablet Commonly known as:  GLUCOPHAGE Take 1,000 mg by mouth two (2) times daily (with meals). omega-3 fatty acids-vitamin e 1,000 mg Cap Commonly known as:  FISH OIL Take 1 Cap by mouth two (2) times a day. pantoprazole 40 mg granules for oral suspension Commonly known as:  PROTONIX Take 40 mg by mouth daily. pravastatin 40 mg tablet Commonly known as:  PRAVACHOL Take 1.5 Tabs by mouth nightly. Take one-half tablet by mouth at bedtime  
  
 traMADol 50 mg tablet Commonly known as:  ULTRAM  
Take 1 Tab by mouth every eight (8) hours as needed for Pain. Max Daily Amount: 150 mg.  
  
  
  
  
Prescriptions Printed Refills  
 traMADol (ULTRAM) 50 mg tablet 0 Sig: Take 1 Tab by mouth every eight (8) hours as needed for Pain. Max Daily Amount: 150 mg.  
 Class: Print Route: Oral  
  
Prescriptions Sent to Pharmacy Refills  
 losartan (COZAAR) 50 mg tablet 3 Sig: Take 1 Tab by mouth daily. Class: Normal  
 Pharmacy: [address] [place] [address] Ph #: [phone] Route: Oral  
 carvedilol (COREG) 6.25 mg tablet 3 Sig: Take 1 Tab by mouth two (2) times a day. Class: Normal  
 Pharmacy: [place] [address] [address] [facility] [address] [facility] 305 Ph #: [phone] Route: Oral  
 furosemide (LASIX) 40 mg tablet 3 Sig: Take 1 Tab by mouth daily. Class: Normal  
 Pharmacy: [place] [address] [address] [facility] [address] [place] [address] Ph #: [phone]  Route: Oral  
  
 We Performed the Following REFERRAL TO NEPHROLOGY [QYW12 Custom] Follow-up Instructions Return in about 1 month (around 7/8/2018). Referral Information Referral ID Referred By Referred To  
  
 [id number] Latricia CASTANO Not Available Visits Status Start Date End Date 1 New Request 6/8/18 6/8/19 If your referral has a status of pending review or denied, additional information will be sent to support the outcome of this decision. Introducing [facility]! Dear Mayra Pollock: Thank you for requesting a [employer] account. Our records indicate that you already have an active [employer] account. You can access your account anytime at [email or web address] Did you know that you can access your hospital and ER discharge instructions at any time in [employer]? You can also review all of your test results from your hospital stay or ER visit. Additional Information If you have questions, please visit the Frequently Asked Questions section of the [employer] website at [email or web address] Remember, [employer] is NOT to be used for urgent needs. For medical emergencies, dial 911. Now available from your iPhone and Android! Please provide this summary of care documentation to your next provider. Your primary care clinician is listed as Reynold Jolly. If you have any questions after today's visit, please call [phone].

## 2018-06-08 NOTE — PROGRESS NOTES
1. Have you been to the ER, urgent care clinic since your last visit? Hospitalized since your last visit? No    2. Have you seen or consulted any other health care providers outside of the [address] [place] [address] since your last visit? Include any pap smears or colon screening.  No

## 2018-06-08 NOTE — TELEPHONE ENCOUNTER
[facility] called and stated they can only fill for 7 day supply for refill due to insurance, they can give 21 pills x 1 refill. Nurse gave okay.

## 2018-06-09 NOTE — ACP (ADVANCE CARE PLANNING)
Advance Care Planning (ACP) Provider Note - Comprehensive     Date of ACP Conversation: 6/8/2018  Persons included in Conversation:  patient  Length of ACP Conversation in minutes:  <16 minutes (Non-Billable)    Authorized Decision Maker (if patient is incapable of making informed decisions): This person is:  son          General ACP for ALL Patients with Decision Making Capacity:   Importance of advance care planning, including choosing a healthcare agent to communicate patient's healthcare decisions if patient lost the ability to make decisions, such as after a sudden illness or accident  Understanding of the healthcare agent role was assessed and information provided    Review of Existing Advance Directive:  does not have one    For Serious or Chronic Illness:  Understanding of medical condition    Understanding of CPR, goals and expected outcomes, benefits and burdens discussed.     Interventions Provided:  Recommended completion of Advance Directive form after review of ACP materials and conversation with prospective healthcare agent

## 2018-06-09 NOTE — PROGRESS NOTES
Carlton Nava is a 66 y.o.  male and presents with     Chief Complaint   Patient presents with    Hypertension    Diabetes    Sleep Problem    Shortness of Breath    Leg Swelling       Pt is here for labd results and ECHO results. Pt has JAIMEE but is not using CPAP . HE plans to go to [facility] and get new machine. Pt has leg swelling  Pt says sugars are doing okay. He does have mild SAAVEDRA. Past Medical History:   Diagnosis Date    Back pain     [facility] disease ([facility]     Diabetes ([facility]     Hypercholesterolemia     Hypertension     Sleep apnea     Does not use CPAP     Past Surgical History:   Procedure Laterality Date    COLONOSCOPY N/A 1/23/2017    COLONOSCOPY performed by Huber Lowery MD at [address] [place] [address]  2003    umbilacal     HX SHOULDER ARTHROSCOPY  2004    bilateral     Current Outpatient Prescriptions   Medication Sig    traMADol (ULTRAM) 50 mg tablet Take 1 Tab by mouth every eight (8) hours as needed for Pain. Max Daily Amount: 150 mg.    losartan (COZAAR) 50 mg tablet Take 1 Tab by mouth daily.  carvedilol (COREG) 6.25 mg tablet Take 1 Tab by mouth two (2) times a day.  furosemide (LASIX) 40 mg tablet Take 1 Tab by mouth daily.  cephALEXin (KEFLEX) 500 mg capsule Take 1 Cap by mouth four (4) times daily for 10 days.  fluticasone-salmeterol (ADVAIR DISKUS) 250-50 mcg/dose diskus inhaler Take 1 Puff by inhalation every twelve (12) hours.  fluticasone (FLONASE) 50 mcg/actuation nasal spray 2 Sprays by Both Nostrils route daily.  albuterol (PROVENTIL HFA, VENTOLIN HFA, PROAIR HFA) 90 mcg/actuation inhaler Take 2 Puffs by inhalation every six (6) hours as needed for Wheezing.  cetirizine (ZYRTEC) 10 mg tablet Take 1 Tab by mouth daily.  pantoprazole (PROTONIX) 40 mg granules for oral suspension Take 40 mg by mouth daily.  aspirin 81 mg chewable tablet Take 1 Tab by mouth daily.     gabapentin (NEURONTIN) 300 mg capsule Take 1 Cap by mouth four (4) times daily.  clotrimazole (LOTRIMIN) 1 % topical cream Apply  to affected area two (2) times a day.  hydroCHLOROthiazide (HYDRODIURIL) 25 mg tablet Take 1 Tab by mouth daily.  pravastatin (PRAVACHOL) 40 mg tablet Take 1.5 Tabs by mouth nightly. Take one-half tablet by mouth at bedtime    carboxymethylcellulose sodium (REFRESH TEARS) 0.5 % drop ophthalmic solution Administer 1 Drop to both eyes two (2) times a day.  clobetasol (TEMOVATE) 0.05 % ointment Apply  to affected area two (2) times a day.  cholecalciferol (VITAMIN D3) 1,000 unit tablet Take 1,000 Units by mouth daily.  omega-3 fatty acids-vitamin e (FISH OIL) 1,000 mg cap Take 1 Cap by mouth two (2) times a day.  metFORMIN (GLUCOPHAGE) 1,000 mg tablet Take 1,000 mg by mouth two (2) times daily (with meals).  glipiZIDE (GLUCOTROL) 10 mg tablet Take 10 mg by mouth three (3) times daily.  amLODIPine (NORVASC) 10 mg tablet Take 10 mg by mouth daily. No current facility-administered medications for this visit. Health Maintenance   Topic Date Due    EYE EXAM RETINAL OR DILATED Q1  03/08/1972    Pneumococcal 19-64 Highest Risk (2 of 3 - PCV13) 07/27/2017    FOOT EXAM Q1  03/21/2018    Influenza Age 9 to Adult  08/01/2018    HEMOGLOBIN A1C Q6M  11/08/2018    MICROALBUMIN Q1  05/08/2019    LIPID PANEL Q1  05/08/2019    MEDICARE YEARLY EXAM  06/09/2019    COLONOSCOPY  01/23/2022    DTaP/Tdap/Td series (2 - Td) 09/17/2025    Hepatitis C Screening  Completed     Immunization History   Administered Date(s) Administered    Influenza Vaccine 09/17/2015    Influenza Vaccine (Quad) PF 10/28/2016, 12/22/2017    Pneumococcal Polysaccharide (PPSV-23) 07/27/2016    Tdap 09/17/2015     No LMP for male patient. Allergies and Intolerances:    Allergies   Allergen Reactions    Lisinopril Swelling       Family History:   Family History   Problem Relation Age of Onset    Cancer Mother  Hypertension Mother     Heart Disease Mother     Diabetes Mother     No Known Problems Father        Social History:   He  reports that he has been smoking Cigarettes. He has a 12.50 pack-year smoking history. He quit smokeless tobacco use about 2 years ago. He  reports that he drinks about 2.4 oz of alcohol per week             Review of Systems: pos for snoring, SAAVEDRA, leg swelling  General: negative for - chills, fatigue, fever, weight change  Psych: negative for - anxiety, depression, irritability or mood swings  ENT: negative for - headaches, hearing change, nasal congestion, oral lesions, sneezing or sore throat  Heme/ Lymph: negative for - bleeding problems, bruising, pallor or swollen lymph nodes  Endo: negative for - hot flashes, polydipsia/polyuria or temperature intolerance  Resp: negative for - cough, shortness of breath or wheezing  CV: negative for - chest pain, edema or palpitations  GI: negative for - abdominal pain, change in bowel habits, constipation, diarrhea or nausea/vomiting  : negative for - dysuria, hematuria, incontinence, pelvic pain or vulvar/vaginal symptoms  MSK: negative for - joint pain, joint swelling or muscle pain  Neuro: negative for - confusion, headaches, seizures or weakness  Derm: negative for - dry skin, hair changes, rash or skin lesion changes          Physical:   Vitals:   Vitals:    06/08/18 0953   BP: 131/83   Pulse: 99   Resp: 16   Temp: 97.7 °F (36.5 °C)   TempSrc: Oral   SpO2: 92%   Weight: 202 lb (91.6 kg)   Height: 5' 9\" (1.753 m)           Exam:   HEENT- atraumatic,normocephalic, awake, oriented, well nourished,obese  Neck - supple,no enlarged lymph nodes, no JVD, no thyromegaly  Chest- CTA, no rhonchi, no crackles  Heart- rrr, no murmurs / gallop/rub  Abdomen- soft,BS+,NT, no hepatosplenomegaly  Ext - trace edema   Neuro- no focal deficits. Power 5/5 all extremities  Skin - warm,dry, no obvious rashes.           Review of Data:   LABS:   Lab Results Component Value Date/Time    WBC 7.1 05/08/2018 10:49 AM    HGB 15.0 05/08/2018 10:49 AM    HCT 46.3 05/08/2018 10:49 AM    PLATELET 449 22/25/1359 10:49 AM     Lab Results   Component Value Date/Time    Sodium 140 05/08/2018 10:49 AM    Potassium 4.6 05/08/2018 10:49 AM    Chloride 103 05/08/2018 10:49 AM    CO2 30 05/08/2018 10:49 AM    Glucose 126 (H) 05/08/2018 10:49 AM    BUN 9 05/08/2018 10:49 AM    Creatinine 0.95 05/08/2018 10:49 AM     Lab Results   Component Value Date/Time    Cholesterol, total 195 05/08/2018 10:49 AM    HDL Cholesterol 51 05/08/2018 10:49 AM    LDL, calculated 98.4 05/08/2018 10:49 AM    Triglyceride 228 (H) 05/08/2018 10:49 AM     No results found for: GPT        Impression / Plan:        ICD-10-CM ICD-9-CM    1. Nephrotic syndrome N04.9 581.9 REFERRAL TO NEPHROLOGY   2. Type 2 diabetes mellitus without complication, unspecified whether long term insulin use (HCC) E11.9 250.00 REFERRAL TO NEPHROLOGY   3. Midline low back pain with right-sided sciatica, unspecified chronicity M54.41 724.3 traMADol (ULTRAM) 50 mg tablet   4. Leg swelling M79.89 729.81 furosemide (LASIX) 40 mg tablet   5. SAAVEDRA (dyspnea on exertion) R06.09 786.09    6. Cardiomyopathy, unspecified type (HCC) I42.9 425.4 carvedilol (COREG) 6.25 mg tablet   7. Essential hypertension I10 401.9 losartan (COZAAR) 50 mg tablet      carvedilol (COREG) 6.25 mg tablet      REFERRAL TO NEPHROLOGY   8. Ingrown nail of great toe of right foot L60.0 703.0 REFERRAL TO PODIATRY   9. Infected nailbed of toe, right L03.031 681.11 cephALEXin (KEFLEX) 500 mg capsule      reviewed. Will reduce cozaar , add beta bloacker for LVH    Asked pt to get CPAP machine and start using it. Explained to patient risk benefits of the medications. Advised patient to stop meds if having any side effects. Pt verbalized understanding of the instructions.     I have discussed the diagnosis with the patient and the intended plan as seen in the above orders. The patient has received an after-visit summary and questions were answered concerning future plans. I have discussed medication side effects and warnings with the patient as well. I have reviewed the plan of care with the patient, accepted their input and they are in agreement with the treatment goals. Reviewed plan of care. Patient has provided input and agrees with goals. Follow-up Disposition:  Return in about 1 month (around 7/8/2018). Jose Henderson MD                      This is the Subsequent Medicare Annual Wellness Exam, performed 12 months or more after the Initial AWV or the last Subsequent AWV    I have reviewed the patient's medical history in detail and updated the computerized patient record. History     Past Medical History:   Diagnosis Date    Back pain     [facility]     Diabetes ([facility]     Hypercholesterolemia     Hypertension     Sleep apnea     Does not use CPAP      Past Surgical History:   Procedure Laterality Date    COLONOSCOPY N/A 1/23/2017    COLONOSCOPY performed by Yesy Foster MD at [address] [place] [address]  2003    umbilacal     HX SHOULDER ARTHROSCOPY  2004    bilateral     Current Outpatient Prescriptions   Medication Sig Dispense Refill    traMADol (ULTRAM) 50 mg tablet Take 1 Tab by mouth every eight (8) hours as needed for Pain. Max Daily Amount: 150 mg. 40 Tab 0    losartan (COZAAR) 50 mg tablet Take 1 Tab by mouth daily. 30 Tab 3    carvedilol (COREG) 6.25 mg tablet Take 1 Tab by mouth two (2) times a day. 60 Tab 3    furosemide (LASIX) 40 mg tablet Take 1 Tab by mouth daily. 30 Tab 3    cephALEXin (KEFLEX) 500 mg capsule Take 1 Cap by mouth four (4) times daily for 10 days. 40 Cap 0    fluticasone-salmeterol (ADVAIR DISKUS) 250-50 mcg/dose diskus inhaler Take 1 Puff by inhalation every twelve (12) hours.  1 Inhaler 3    fluticasone (FLONASE) 50 mcg/actuation nasal spray 2 Sprays by Both Nostrils route daily. 1 Bottle 3    albuterol (PROVENTIL HFA, VENTOLIN HFA, PROAIR HFA) 90 mcg/actuation inhaler Take 2 Puffs by inhalation every six (6) hours as needed for Wheezing. 1 Inhaler 3    cetirizine (ZYRTEC) 10 mg tablet Take 1 Tab by mouth daily. 30 Tab 3    pantoprazole (PROTONIX) 40 mg granules for oral suspension Take 40 mg by mouth daily. 30 Each 3    aspirin 81 mg chewable tablet Take 1 Tab by mouth daily. 90 Tab 1    gabapentin (NEURONTIN) 300 mg capsule Take 1 Cap by mouth four (4) times daily. 120 Cap 3    clotrimazole (LOTRIMIN) 1 % topical cream Apply  to affected area two (2) times a day. 35 g 1    hydroCHLOROthiazide (HYDRODIURIL) 25 mg tablet Take 1 Tab by mouth daily. 90 Tab 0    pravastatin (PRAVACHOL) 40 mg tablet Take 1.5 Tabs by mouth nightly. Take one-half tablet by mouth at bedtime 90 Tab 1    carboxymethylcellulose sodium (REFRESH TEARS) 0.5 % drop ophthalmic solution Administer 1 Drop to both eyes two (2) times a day. 30 mL 3    clobetasol (TEMOVATE) 0.05 % ointment Apply  to affected area two (2) times a day. 15 g 2    cholecalciferol (VITAMIN D3) 1,000 unit tablet Take 1,000 Units by mouth daily.  omega-3 fatty acids-vitamin e (FISH OIL) 1,000 mg cap Take 1 Cap by mouth two (2) times a day. 60 Cap 3    metFORMIN (GLUCOPHAGE) 1,000 mg tablet Take 1,000 mg by mouth two (2) times daily (with meals).  glipiZIDE (GLUCOTROL) 10 mg tablet Take 10 mg by mouth three (3) times daily.  amLODIPine (NORVASC) 10 mg tablet Take 10 mg by mouth daily.        Allergies   Allergen Reactions    Lisinopril Swelling     Family History   Problem Relation Age of Onset   [facility] Mother     Hypertension Mother     Heart Disease Mother     Diabetes Mother     No Known Problems Father      Social History   Substance Use Topics    Smoking status: Current Every Day Smoker     Packs/day: 0.50     Years: 25.00     Types: Cigarettes    Smokeless tobacco: Former User     Quit date: 3/16/2016    Alcohol use 2.4 oz/week     0 Standard drinks or equivalent, 4 Shots of liquor per week     Patient Active Problem List   Diagnosis Code    Midline low back pain without sciatica M54.5    Type 2 diabetes mellitus without complication ([facility]) J70.7    Essential hypertension I10    Hypercholesteremia E78.00    Hip pain M25.559    JAIMEE (obstructive sleep apnea) G47.33    Clubbing of nail R68.3    Chronic obstructive pulmonary disease ([facility]) J44.9    Type 2 diabetes mellitus with nephropathy ([facility]) E11.21    Type 2 diabetes mellitus with diabetic neuropathy ([facility]) E11.40       Depression Risk Factor Screening:     PHQ over the last two weeks 2/8/2018   Little interest or pleasure in doing things Not at all   Feeling down, depressed or hopeless Not at all   Total Score PHQ 2 0     Alcohol Risk Factor Screening: You do not drink alcohol or very rarely. Functional Ability and Level of Safety:   Hearing Loss  Hearing is good. Activities of Daily Living  The home contains: no safety equipment. Patient does total self care    Fall Risk  No flowsheet data found. Abuse Screen  Patient is not abused    Cognitive Screening   Evaluation of Cognitive Function:  Has your family/caregiver stated any concerns about your memory: no  Normal    Patient Care Team   Patient Care Team:  [id number] [address] Roz Cantu MD as PCP - General (Internal Medicine)  Ofe Moon MD (Orthopedic Surgery)  Guerrero Giraldo DPM (Podiatry)    Assessment/Plan   Education and counseling provided:  Are appropriate based on today's review and evaluation    Diagnoses and all orders for this visit:    1. Nephrotic syndrome  -     REFERRAL TO NEPHROLOGY    2. Type 2 diabetes mellitus without complication, unspecified whether long term insulin use (HCC)  -     REFERRAL TO NEPHROLOGY    3. Midline low back pain with right-sided sciatica, unspecified chronicity  -     traMADol (ULTRAM) 50 mg tablet;  Take 1 Tab by mouth every eight (8) hours as needed for Pain. Max Daily Amount: 150 mg.    4. Leg swelling  -     furosemide (LASIX) 40 mg tablet; Take 1 Tab by mouth daily. 5. SAAVEDRA (dyspnea on exertion)    6. Cardiomyopathy, unspecified type (HCC)  -     carvedilol (COREG) 6.25 mg tablet; Take 1 Tab by mouth two (2) times a day. 7. Essential hypertension  -     losartan (COZAAR) 50 mg tablet; Take 1 Tab by mouth daily. -     carvedilol (COREG) 6.25 mg tablet; Take 1 Tab by mouth two (2) times a day. -     REFERRAL TO NEPHROLOGY    8. Ingrown nail of great toe of right foot  -     REFERRAL TO PODIATRY    9. Infected nailbed of toe, right  -     cephALEXin (KEFLEX) 500 mg capsule; Take 1 Cap by mouth four (4) times daily for 10 days.         Health Maintenance Due   Topic Date Due    EYE EXAM RETINAL OR DILATED Q1  03/08/1972    Pneumococcal 19-64 Highest Risk (2 of 3 - PCV13) 07/27/2017    FOOT EXAM Q1  03/21/2018

## 2018-06-09 NOTE — PATIENT INSTRUCTIONS
Medicare Wellness Visit, Male    The best way to live healthy is to have a lifestyle where you eat a well-balanced diet, exercise regularly, limit alcohol use, and quit all forms of tobacco/nicotine, if applicable. Regular preventive services are another way to keep healthy. Preventive services (vaccines, screening tests, monitoring & exams) can help personalize your care plan, which helps you manage your own care. Screening tests can find health problems at the earliest stages, when they are easiest to treat. [facility] follows the current, evidence-based guidelines published by the [employer] ([facility]) when recommending preventive services for our patients. Because we follow these guidelines, sometimes recommendations change over time as research supports it. (For example, a prostate screening blood test is no longer routinely recommended for men with no symptoms.)    Of course, you and your provider may decide to screen more often for some diseases, based on your risk and co-morbidities (chronic disease you are already diagnosed with). Preventive services for you include:    - Medicare offers their members a free annual wellness visit, which is time for you and your primary care provider to discuss and plan for your preventive service needs. Take advantage of this benefit every year!    -All people over age 72 should receive the recommended pneumonia vaccines. Current USPSTF guidelines recommend a series of two vaccines for the best pneumonia protection.     -All adults should have a yearly flu vaccine and a tetanus vaccine every 10 years.  All adults age 61 years should receive a shingles vaccine once in their lifetime.      -All adults age 38-68 years who are overweight should have a diabetes screening test once every three years.     -Other screening tests & preventive services for persons with diabetes include: an eye exam to screen for diabetic retinopathy, a kidney function test, a foot exam, and stricter control over your cholesterol.     -Cardiovascular screening for adults with routine risk involves an electrocardiogram (ECG) at intervals determined by the provider.     -Colorectal cancer screenings should be done for adults age 54-65 years with normal risk. There are a number of acceptable methods of screening for this type of cancer. Each test has its own benefits and drawbacks. Discuss with your provider what is most appropriate for you during your annual wellness visit. The different tests include: colonoscopy (considered the best screening method), a fecal occult blood test, a fecal DNA test, and sigmoidoscopy.    -All adults born between [facility] should be screened once for Hepatitis C.    -An Abdominal Aortic Aneurysm (AAA) Screening is recommended for men age 73-68 who has ever smoked in their lifetime.      Here is a list of your current Health Maintenance items (your personalized list of preventive services) with a due date:  Health Maintenance Due   Topic Date Due    Eye Exam  03/08/1970    Pneumococcal Vaccine (2 of 3 - PCV13) 07/27/2017    Diabetic Foot Care  03/21/2018

## 2018-06-25 DIAGNOSIS — K21.9 GASTROESOPHAGEAL REFLUX DISEASE WITHOUT ESOPHAGITIS: ICD-10-CM

## 2018-06-25 RX ORDER — PANTOPRAZOLE SODIUM 40 MG/1
TABLET, DELAYED RELEASE ORAL
Qty: 30 TAB | Refills: 3 | Status: SHIPPED | OUTPATIENT
Start: 2018-06-25 | End: 2018-07-12 | Stop reason: SDUPTHER

## 2018-07-12 ENCOUNTER — OFFICE VISIT (OUTPATIENT)
Dept: FAMILY MEDICINE CLINIC | Age: 58
End: 2018-07-12

## 2018-07-12 VITALS
DIASTOLIC BLOOD PRESSURE: 86 MMHG | HEIGHT: 69 IN | RESPIRATION RATE: 16 BRPM | WEIGHT: 200.2 LBS | BODY MASS INDEX: 29.65 KG/M2 | OXYGEN SATURATION: 91 % | SYSTOLIC BLOOD PRESSURE: 147 MMHG | HEART RATE: 92 BPM | TEMPERATURE: 98.4 F

## 2018-07-12 DIAGNOSIS — J30.2 OTHER SEASONAL ALLERGIC RHINITIS: ICD-10-CM

## 2018-07-12 DIAGNOSIS — M79.89 LEG SWELLING: ICD-10-CM

## 2018-07-12 DIAGNOSIS — I42.9 CARDIOMYOPATHY, UNSPECIFIED TYPE (HCC): ICD-10-CM

## 2018-07-12 DIAGNOSIS — N04.9 NEPHROTIC SYNDROME: Primary | ICD-10-CM

## 2018-07-12 DIAGNOSIS — K21.9 GASTROESOPHAGEAL REFLUX DISEASE WITHOUT ESOPHAGITIS: ICD-10-CM

## 2018-07-12 DIAGNOSIS — I10 ESSENTIAL HYPERTENSION: ICD-10-CM

## 2018-07-12 RX ORDER — CARVEDILOL 12.5 MG/1
12.5 TABLET ORAL 2 TIMES DAILY WITH MEALS
Qty: 60 TAB | Refills: 3 | Status: SHIPPED | OUTPATIENT
Start: 2018-07-12 | End: 2019-04-23

## 2018-07-12 RX ORDER — GUAIFENESIN 100 MG/5ML
81 LIQUID (ML) ORAL DAILY
Qty: 90 TAB | Refills: 1 | Status: SHIPPED | OUTPATIENT
Start: 2018-07-12

## 2018-07-12 RX ORDER — CETIRIZINE HCL 10 MG
10 TABLET ORAL DAILY
Qty: 30 TAB | Refills: 3 | Status: SHIPPED | OUTPATIENT
Start: 2018-07-12 | End: 2019-02-18 | Stop reason: SDUPTHER

## 2018-07-12 RX ORDER — PANTOPRAZOLE SODIUM 40 MG/1
40 TABLET, DELAYED RELEASE ORAL DAILY
Qty: 30 TAB | Refills: 3 | Status: SHIPPED | OUTPATIENT
Start: 2018-07-12 | End: 2019-04-23 | Stop reason: ALTCHOICE

## 2018-07-12 RX ORDER — FUROSEMIDE 40 MG/1
40 TABLET ORAL DAILY
Qty: 30 TAB | Refills: 3 | Status: SHIPPED | OUTPATIENT
Start: 2018-07-12 | End: 2019-03-12 | Stop reason: SDUPTHER

## 2018-07-12 RX ORDER — CARVEDILOL 6.25 MG/1
6.25 TABLET ORAL 2 TIMES DAILY
Qty: 60 TAB | Refills: 3 | Status: CANCELLED | OUTPATIENT
Start: 2018-07-12

## 2018-07-12 NOTE — PROGRESS NOTES
Claire Fountain is a 66 y.o.  male and presents with     Chief Complaint   Patient presents with    Diabetes    Leg Swelling    GERD     Pt needs med refills. He is taking BP meds but BP is stil high. He is taking lasix for leg swelling and it is under control. He is wating to see Nephrology for  Nephrotic syndrome  He has been drinking lot of water      Past Medical History:   Diagnosis Date    Back pain     [facility] disease ([facility]     Diabetes ([facility]     Hypercholesterolemia     Hypertension     Sleep apnea     Does not use CPAP     Past Surgical History:   Procedure Laterality Date    COLONOSCOPY N/A 1/23/2017    COLONOSCOPY performed by Heidi Howell MD at [address] [place] [address]  2003    umbilacal     HX SHOULDER ARTHROSCOPY  2004    bilateral     Current Outpatient Prescriptions   Medication Sig    furosemide (LASIX) 40 mg tablet Take 1 Tab by mouth daily.  aspirin 81 mg chewable tablet Take 1 Tab by mouth daily.  cetirizine (ZYRTEC) 10 mg tablet Take 1 Tab by mouth daily.  pantoprazole (PROTONIX) 40 mg tablet Take 1 Tab by mouth daily.  carvedilol (COREG) 12.5 mg tablet Take 1 Tab by mouth two (2) times daily (with meals).  traMADol (ULTRAM) 50 mg tablet Take 1 Tab by mouth every eight (8) hours as needed for Pain. Max Daily Amount: 150 mg.    losartan (COZAAR) 50 mg tablet Take 1 Tab by mouth daily.  fluticasone-salmeterol (ADVAIR DISKUS) 250-50 mcg/dose diskus inhaler Take 1 Puff by inhalation every twelve (12) hours.  fluticasone (FLONASE) 50 mcg/actuation nasal spray 2 Sprays by Both Nostrils route daily.  albuterol (PROVENTIL HFA, VENTOLIN HFA, PROAIR HFA) 90 mcg/actuation inhaler Take 2 Puffs by inhalation every six (6) hours as needed for Wheezing.  gabapentin (NEURONTIN) 300 mg capsule Take 1 Cap by mouth four (4) times daily.  clotrimazole (LOTRIMIN) 1 % topical cream Apply  to affected area two (2) times a day.  hydroCHLOROthiazide (HYDRODIURIL) 25 mg tablet Take 1 Tab by mouth daily.  pravastatin (PRAVACHOL) 40 mg tablet Take 1.5 Tabs by mouth nightly. Take one-half tablet by mouth at bedtime    carboxymethylcellulose sodium (REFRESH TEARS) 0.5 % drop ophthalmic solution Administer 1 Drop to both eyes two (2) times a day.  clobetasol (TEMOVATE) 0.05 % ointment Apply  to affected area two (2) times a day.  cholecalciferol (VITAMIN D3) 1,000 unit tablet Take 1,000 Units by mouth daily.  omega-3 fatty acids-vitamin e (FISH OIL) 1,000 mg cap Take 1 Cap by mouth two (2) times a day.  metFORMIN (GLUCOPHAGE) 1,000 mg tablet Take 1,000 mg by mouth two (2) times daily (with meals).  glipiZIDE (GLUCOTROL) 10 mg tablet Take 10 mg by mouth three (3) times daily.  amLODIPine (NORVASC) 10 mg tablet Take 10 mg by mouth daily. No current facility-administered medications for this visit. Health Maintenance   Topic Date Due    EYE EXAM RETINAL OR DILATED Q1  03/08/1972    Pneumococcal 19-64 Highest Risk (2 of 3 - PCV13) 07/27/2017    FOOT EXAM Q1  03/21/2018    Influenza Age 9 to Adult  08/01/2018    HEMOGLOBIN A1C Q6M  11/08/2018    MICROALBUMIN Q1  05/08/2019    LIPID PANEL Q1  05/08/2019    MEDICARE YEARLY EXAM  06/09/2019    COLONOSCOPY  01/23/2022    DTaP/Tdap/Td series (2 - Td) 09/17/2025    Hepatitis C Screening  Completed     Immunization History   Administered Date(s) Administered    Influenza Vaccine 09/17/2015    Influenza Vaccine (Quad) PF 10/28/2016, 12/22/2017    Pneumococcal Polysaccharide (PPSV-23) 07/27/2016    Tdap 09/17/2015     No LMP for male patient. Allergies and Intolerances:    Allergies   Allergen Reactions    Lisinopril Swelling       Family History:   Family History   Problem Relation Age of Onset   [facility] Mother     Hypertension Mother     Heart Disease Mother     Diabetes Mother     No Known Problems Father        Social History:   He  reports that he has been smoking Cigarettes. He has a 12.50 pack-year smoking history. He quit smokeless tobacco use about 2 years ago. He  reports that he drinks about 2.4 oz of alcohol per week             Review of Systems:   General: negative for - chills, fatigue, fever, weight change  Psych: negative for - anxiety, depression, irritability or mood swings  ENT: negative for - headaches, hearing change, nasal congestion, oral lesions, sneezing or sore throat  Heme/ Lymph: negative for - bleeding problems, bruising, pallor or swollen lymph nodes  Endo: negative for - hot flashes, polydipsia/polyuria or temperature intolerance  Resp: negative for - cough, shortness of breath or wheezing  CV: negative for - chest pain, edema or palpitations  GI: negative for - abdominal pain, change in bowel habits, constipation, diarrhea or nausea/vomiting  : negative for - dysuria, hematuria, incontinence, pelvic pain or vulvar/vaginal symptoms  MSK: negative for - joint pain, joint swelling or muscle pain  Neuro: negative for - confusion, headaches, seizures or weakness  Derm: negative for - dry skin, hair changes, rash or skin lesion changes          Physical:   Vitals:   Vitals:    07/12/18 1241   BP: 147/86   Pulse: 92   Resp: 16   Temp: 98.4 °F (36.9 °C)   TempSrc: Oral   SpO2: 91%   Weight: 200 lb 3.2 oz (90.8 kg)   Height: 5' 9\" (1.753 m)           Exam:   HEENT- atraumatic,normocephalic, awake, oriented, well nourished  Neck - supple,no enlarged lymph nodes, no JVD, no thyromegaly  Chest- CTA, no rhonchi, no crackles  Heart- rrr, no murmurs / gallop/rub  Abdomen- soft,BS+,NT, no hepatosplenomegaly  Ext - no c/c/edema   Neuro- no focal deficits. Power 5/5 all extremities  Skin - warm,dry, no obvious rashes.           Review of Data:   LABS:   Lab Results   Component Value Date/Time    WBC 7.1 05/08/2018 10:49 AM    HGB 15.0 05/08/2018 10:49 AM    HCT 46.3 05/08/2018 10:49 AM    PLATELET 312 60/67/4051 10:49 AM     Lab Results Component Value Date/Time    Sodium 140 05/08/2018 10:49 AM    Potassium 4.6 05/08/2018 10:49 AM    Chloride 103 05/08/2018 10:49 AM    CO2 30 05/08/2018 10:49 AM    Glucose 126 (H) 05/08/2018 10:49 AM    BUN 9 05/08/2018 10:49 AM    Creatinine 0.95 05/08/2018 10:49 AM     Lab Results   Component Value Date/Time    Cholesterol, total 195 05/08/2018 10:49 AM    HDL Cholesterol 51 05/08/2018 10:49 AM    LDL, calculated 98.4 05/08/2018 10:49 AM    Triglyceride 228 (H) 05/08/2018 10:49 AM     No results found for: GPT        Impression / Plan:        ICD-10-CM ICD-9-CM    1. Nephrotic syndrome N04.9 581.9    2. Cardiomyopathy, unspecified type (HCC) I42.9 425.4 carvedilol (COREG) 12.5 mg tablet   3. Essential hypertension I10 401.9    4. Leg swelling M79.89 729.81 furosemide (LASIX) 40 mg tablet   5. Other seasonal allergic rhinitis J30.2 477.8 cetirizine (ZYRTEC) 10 mg tablet   6. Gastroesophageal reflux disease without esophagitis K21.9 530.81 pantoprazole (PROTONIX) 40 mg tablet     Advised pt to cut down on water since it will casue fluid overload. Will increase coreg to 12.5 mg po bid. Explained to patient risk benefits of the medications. Advised patient to stop meds if having any side effects. Pt verbalized understanding of the instructions. I have discussed the diagnosis with the patient and the intended plan as seen in the above orders. The patient has received an after-visit summary and questions were answered concerning future plans. I have discussed medication side effects and warnings with the patient as well. I have reviewed the plan of care with the patient, accepted their input and they are in agreement with the treatment goals. Reviewed plan of care. Patient has provided input and agrees with goals.     Follow-up Disposition: Not on Kendra Mckeon MD

## 2018-07-12 NOTE — MR AVS SNAPSHOT
[address] [place] [address] [phone] 
[phone] Patient: Isaías Alba MRN: [id number] :1960 Visit Information Date & Time Provider Department Dept. Phone Encounter #  
 2018 12:45 PM [id number], [address] [facility] [phone] Follow-up Instructions Return in about 3 months (around 10/12/2018). Upcoming Health Maintenance Date Due  
 EYE EXAM RETINAL OR DILATED Q1 3/8/1972 Pneumococcal 19-64 Highest Risk (2 of 3 - PCV13) 2017 FOOT EXAM Q1 3/21/2018 Influenza Age 5 to Adult 2018 HEMOGLOBIN A1C Q6M 2018 MICROALBUMIN Q1 2019 LIPID PANEL Q1 2019 MEDICARE YEARLY EXAM 2019 COLONOSCOPY 2022 DTaP/Tdap/Td series (2 - Td) 2025 Allergies as of 2018  Review Complete On: 2018 By: Bethany Alonso LPN Severity Noted Reaction Type Reactions Lisinopril  2016    Swelling Current Immunizations  Never Reviewed Name Date Influenza Vaccine 2015  9:38 AM  
 Influenza Vaccine (Quad) PF 2017  9:37 AM, 10/28/2016 Pneumococcal Polysaccharide (PPSV-23) 2016 Tdap 2015  9:39 AM  
  
 Not reviewed this visit You Were Diagnosed With   
  
 Codes Comments Nephrotic syndrome    -  Primary ICD-10-CM: N04.9 ICD-9-CM: 330. 9 Cardiomyopathy, unspecified type ([email or web address]     ICD-10-CM: I42.9 ICD-9-CM: 425.4 Essential hypertension     ICD-10-CM: I10 
ICD-9-CM: 401.9 Leg swelling     ICD-10-CM: M79.89 ICD-9-CM: 729.81 Other seasonal allergic rhinitis     ICD-10-CM: J30.2 ICD-9-CM: 477.8 Gastroesophageal reflux disease without esophagitis     ICD-10-CM: K21.9 ICD-9-CM: 530.81 Vitals BP Pulse Temp Resp Height(growth percentile) Weight(growth percentile) 147/86 92 98.4 °F (36.9 °C) (Oral) 16 5' 9\" (1.753 m) 200 lb 3.2 oz (90.8 kg) SpO2 BMI Smoking Status 91% 29.56 kg/m2 Current Every Day Smoker Vitals History BMI and BSA Data Body Mass Index Body Surface Area  
 29.56 kg/m 2 2.1 m 2 Preferred Pharmacy Pharmacy Name Phone [facility] [address] [place] [address] Ann. [phone] Your Updated Medication List  
  
   
This list is accurate as of 7/12/18  1:02 PM.  Always use your most recent med list.  
  
  
  
  
 albuterol 90 mcg/actuation inhaler Commonly known as:  PROVENTIL HFA, VENTOLIN HFA, PROAIR HFA Take 2 Puffs by inhalation every six (6) hours as needed for Wheezing. amLODIPine 10 mg tablet Commonly known as:  Nandini Billie Take 10 mg by mouth daily. aspirin 81 mg chewable tablet Take 1 Tab by mouth daily. carboxymethylcellulose sodium 0.5 % Drop ophthalmic solution Commonly known as:  REFRESH TEARS Administer 1 Drop to both eyes two (2) times a day. carvedilol 12.5 mg tablet Commonly known as:  Deion Gut Take 1 Tab by mouth two (2) times daily (with meals). cetirizine 10 mg tablet Commonly known as:  ZYRTEC Take 1 Tab by mouth daily. cholecalciferol 1,000 unit tablet Commonly known as:  VITAMIN D3 Take 1,000 Units by mouth daily. clobetasol 0.05 % ointment Commonly known as:  Blakereed Mcfarland Apply  to affected area two (2) times a day. clotrimazole 1 % topical cream  
Commonly known as:  Marilyn December Apply  to affected area two (2) times a day. fluticasone 50 mcg/actuation nasal spray Commonly known as:  Wendie Jumper 2 Sprays by Both Nostrils route daily. fluticasone-salmeterol 250-50 mcg/dose diskus inhaler Commonly known as:  ADVAIR DISKUS Take 1 Puff by inhalation every twelve (12) hours. furosemide 40 mg tablet Commonly known as:  LASIX Take 1 Tab by mouth daily. gabapentin 300 mg capsule Commonly known as:  NEURONTIN Take 1 Cap by mouth four (4) times daily. glipiZIDE 10 mg tablet Commonly known as:  Caralyn Fortis Take 10 mg by mouth three (3) times daily. hydroCHLOROthiazide 25 mg tablet Commonly known as:  HYDRODIURIL Take 1 Tab by mouth daily. losartan 50 mg tablet Commonly known as:  COZAAR Take 1 Tab by mouth daily. metFORMIN 1,000 mg tablet Commonly known as:  GLUCOPHAGE Take 1,000 mg by mouth two (2) times daily (with meals). omega-3 fatty acids-vitamin e 1,000 mg Cap Commonly known as:  FISH OIL Take 1 Cap by mouth two (2) times a day. pantoprazole 40 mg tablet Commonly known as:  PROTONIX Take 1 Tab by mouth daily. pravastatin 40 mg tablet Commonly known as:  PRAVACHOL Take 1.5 Tabs by mouth nightly. Take one-half tablet by mouth at bedtime  
  
 traMADol 50 mg tablet Commonly known as:  ULTRAM  
Take 1 Tab by mouth every eight (8) hours as needed for Pain. Max Daily Amount: 150 mg.  
  
  
  
  
Prescriptions Sent to Pharmacy Refills  
 furosemide (LASIX) 40 mg tablet 3 Sig: Take 1 Tab by mouth daily. Class: Normal  
 Pharmacy: [facility] [address] ACEVEDO [address] [place] [address], [address] [address]. Ph #: [phone] Route: Oral  
 aspirin 81 mg chewable tablet 1 Sig: Take 1 Tab by mouth daily. Class: Normal  
 Pharmacy: [facility] [address] [place] [address], [address] [address]. Ph #: [phone] Route: Oral  
 cetirizine (ZYRTEC) 10 mg tablet 3 Sig: Take 1 Tab by mouth daily. Class: Normal  
 Pharmacy: [facility] [address] ACE[address] [place] [address], [address] [address]. Ph #: [phone] Route: Oral  
 pantoprazole (PROTONIX) 40 mg tablet 3 Sig: Take 1 Tab by mouth daily. Class: Normal  
 Pharmacy: [facility] [address] ACE[address] [place] [address], [address] [address]. Ph #: [phone] Route: Oral  
 carvedilol (COREG) 12.5 mg tablet 3 Sig: Take 1 Tab by mouth two (2) times daily (with meals). Class: Normal  
 Pharmacy: [facility] [address]LIN ACEV[address]O [address] [place] [address].  #: [phone] Route: Oral  
  
Follow-up Instructions Return in about 3 months (around 10/12/2018). Introducing [facility]! Dear Samantha Blanco: Thank you for requesting a [employer] account. Our records indicate that you already have an active [employer] account. You can access your account anytime at [email or web address] Did you know that you can access your hospital and ER discharge instructions at any time in [employer]? You can also review all of your test results from your hospital stay or ER visit. Additional Information If you have questions, please visit the Frequently Asked Questions section of the [employer] website at [email or web address] Remember, [employer] is NOT to be used for urgent needs. For medical emergencies, dial 911. Now available from your iPhone and Android! Please provide this summary of care documentation to your next provider. Your primary care clinician is listed as Magdalena Brown. If you have any questions after today's visit, please call [phone].

## 2018-07-17 ENCOUNTER — TELEPHONE (OUTPATIENT)
Dept: FAMILY MEDICINE CLINIC | Age: 58
End: 2018-07-17

## 2018-07-17 NOTE — TELEPHONE ENCOUNTER
TWILA PtBenjamin Called in and stated that he could not get in touch with Dr. Za Marquis office to schedule an appt. He stated he was leaving messages and no one gave him a call back. He asked if I could try calling the office to make an appt. I called the office to schedule him an appt. And I spoke with Siddharth Cano from Dr. Za Marquis office, she stated that Coby Mendoza from their office put in their system that it is still pending under Dr. Plata on 06/19/18. Siddharth Friends stated she will send a message to Coby Mendoza to see why it is still pending. I also asked her to contact the pt. When they are ready to schedule him. I also contacted pt. To let him know what was going on.

## 2018-07-17 NOTE — TELEPHONE ENCOUNTER
Evan Danielle from nephrology called back and stated once a PVL and ultrasound is completed, then they will schedule an appt. With pt. I notified pt. Regarding update. Pt. Would like to be called when orders are ready so he can get them done.

## 2018-07-18 NOTE — TELEPHONE ENCOUNTER
Spoke with Praveen Frances at Dr. Sarmad Schofield office to seek clarification,she states that before Dr. Ilan Marques sees his patients, he orders ultrasound and lab work . Patient already had labs done, waiting for patient to get ultrasound done at [facility]. She states there is nothing on our end that we have to do. I called and spoke with patient as well and [email or web address]. PAtient states he will call to schedule that US appnt. I also advised him to call Dr. Sarmad Schofield office once U/S is complete. He verbalized understanding.     [facility] central [phone]  Dr. Agustin Dahl ([facility]) : [phone]

## 2018-08-13 ENCOUNTER — TELEPHONE (OUTPATIENT)
Dept: FAMILY MEDICINE CLINIC | Age: 58
End: 2018-08-13

## 2018-08-13 NOTE — TELEPHONE ENCOUNTER
PARISI- Pt. stated that he could not get through to Dr. Chaya Boeck office to schedule an appt. I called and made an appt. For Sept. 25th at 9am with Dr. Chaya Boeck office per Robert Cerda at the office. I notified pt. And he confirmed appt. confirmed.

## 2018-09-11 ENCOUNTER — HOSPITAL ENCOUNTER (OUTPATIENT)
Dept: LAB | Age: 58
Discharge: HOME | End: 2018-09-11
Payer: MEDICARE

## 2018-09-11 ENCOUNTER — OFFICE VISIT (OUTPATIENT)
Dept: FAMILY MEDICINE CLINIC | Age: 58
End: 2018-09-11

## 2018-09-11 VITALS
TEMPERATURE: 98 F | RESPIRATION RATE: 18 BRPM | HEIGHT: 69 IN | HEART RATE: 68 BPM | WEIGHT: 202 LBS | OXYGEN SATURATION: 94 % | DIASTOLIC BLOOD PRESSURE: 75 MMHG | SYSTOLIC BLOOD PRESSURE: 121 MMHG | BODY MASS INDEX: 29.92 KG/M2

## 2018-09-11 DIAGNOSIS — R05.9 COUGH: ICD-10-CM

## 2018-09-11 DIAGNOSIS — E78.00 HYPERCHOLESTEREMIA: ICD-10-CM

## 2018-09-11 DIAGNOSIS — M54.41 MIDLINE LOW BACK PAIN WITH RIGHT-SIDED SCIATICA, UNSPECIFIED CHRONICITY: Primary | ICD-10-CM

## 2018-09-11 DIAGNOSIS — E11.21 TYPE 2 DIABETES MELLITUS WITH NEPHROPATHY (HCC): ICD-10-CM

## 2018-09-11 DIAGNOSIS — J43.9 PULMONARY EMPHYSEMA, UNSPECIFIED EMPHYSEMA TYPE (HCC): ICD-10-CM

## 2018-09-11 DIAGNOSIS — J43.8 OTHER EMPHYSEMA (HCC): ICD-10-CM

## 2018-09-11 LAB
ALBUMIN SERPL-MCNC: 3.3 G/DL (ref 3.4–5)
ALBUMIN/GLOB SERPL: 0.8 {RATIO} (ref 0.8–1.7)
ALP SERPL-CCNC: 79 U/L (ref 45–117)
ALT SERPL-CCNC: 15 U/L (ref 16–61)
ANION GAP SERPL CALC-SCNC: 9 MMOL/L (ref 3–18)
AST SERPL-CCNC: 15 U/L (ref 15–37)
BASOPHILS # BLD: 0 K/UL (ref 0–0.1)
BASOPHILS NFR BLD: 1 % (ref 0–2)
BILIRUB SERPL-MCNC: 0.3 MG/DL (ref 0.2–1)
BUN SERPL-MCNC: 15 MG/DL (ref 7–18)
BUN/CREAT SERPL: 10 (ref 12–20)
CALCIUM SERPL-MCNC: 8.8 MG/DL (ref 8.5–10.1)
CHLORIDE SERPL-SCNC: 101 MMOL/L (ref 100–108)
CHOLEST SERPL-MCNC: 194 MG/DL
CO2 SERPL-SCNC: 31 MMOL/L (ref 21–32)
CREAT SERPL-MCNC: 1.5 MG/DL (ref 0.6–1.3)
DIFFERENTIAL METHOD BLD: ABNORMAL
EOSINOPHIL # BLD: 0.2 K/UL (ref 0–0.4)
EOSINOPHIL NFR BLD: 2 % (ref 0–5)
ERYTHROCYTE [DISTWIDTH] IN BLOOD BY AUTOMATED COUNT: 16 % (ref 11.6–14.5)
EST. AVERAGE GLUCOSE BLD GHB EST-MCNC: 160 MG/DL
GLOBULIN SER CALC-MCNC: 4.2 G/DL (ref 2–4)
GLUCOSE SERPL-MCNC: 147 MG/DL (ref 74–99)
HBA1C MFR BLD: 7.2 % (ref 4.2–5.6)
HCT VFR BLD AUTO: 46.9 % (ref 36–48)
HDLC SERPL-MCNC: 37 MG/DL (ref 40–60)
HDLC SERPL: 5.2 {RATIO} (ref 0–5)
HGB BLD-MCNC: 14.7 G/DL (ref 13–16)
LDLC SERPL CALC-MCNC: 82.8 MG/DL (ref 0–100)
LIPID PROFILE,FLP: ABNORMAL
LYMPHOCYTES # BLD: 3.7 K/UL (ref 0.9–3.6)
LYMPHOCYTES NFR BLD: 44 % (ref 21–52)
MCH RBC QN AUTO: 28.5 PG (ref 24–34)
MCHC RBC AUTO-ENTMCNC: 31.3 G/DL (ref 31–37)
MCV RBC AUTO: 91.1 FL (ref 74–97)
MONOCYTES # BLD: 0.7 K/UL (ref 0.05–1.2)
MONOCYTES NFR BLD: 8 % (ref 3–10)
NEUTS SEG # BLD: 3.8 K/UL (ref 1.8–8)
NEUTS SEG NFR BLD: 45 % (ref 40–73)
PLATELET # BLD AUTO: 292 K/UL (ref 135–420)
PMV BLD AUTO: 11.7 FL (ref 9.2–11.8)
POTASSIUM SERPL-SCNC: 3.9 MMOL/L (ref 3.5–5.5)
PROT SERPL-MCNC: 7.5 G/DL (ref 6.4–8.2)
RBC # BLD AUTO: 5.15 M/UL (ref 4.7–5.5)
SODIUM SERPL-SCNC: 141 MMOL/L (ref 136–145)
TRIGL SERPL-MCNC: 371 MG/DL (ref ?–150)
VLDLC SERPL CALC-MCNC: 74.2 MG/DL
WBC # BLD AUTO: 8.5 K/UL (ref 4.6–13.2)

## 2018-09-11 PROCEDURE — 83036 HEMOGLOBIN GLYCOSYLATED A1C: CPT | Performed by: INTERNAL MEDICINE

## 2018-09-11 PROCEDURE — 85025 COMPLETE CBC W/AUTO DIFF WBC: CPT | Performed by: INTERNAL MEDICINE

## 2018-09-11 PROCEDURE — 80061 LIPID PANEL: CPT | Performed by: INTERNAL MEDICINE

## 2018-09-11 PROCEDURE — 36415 COLL VENOUS BLD VENIPUNCTURE: CPT | Performed by: INTERNAL MEDICINE

## 2018-09-11 PROCEDURE — 80053 COMPREHEN METABOLIC PANEL: CPT | Performed by: INTERNAL MEDICINE

## 2018-09-11 RX ORDER — TRAMADOL HYDROCHLORIDE 50 MG/1
50 TABLET ORAL
Qty: 40 TAB | Refills: 0 | Status: SHIPPED | OUTPATIENT
Start: 2018-09-11 | End: 2018-10-31 | Stop reason: SDUPTHER

## 2018-09-11 RX ORDER — FLUTICASONE PROPIONATE AND SALMETEROL 250; 50 UG/1; UG/1
1 POWDER RESPIRATORY (INHALATION) EVERY 12 HOURS
Qty: 1 INHALER | Refills: 3 | Status: SHIPPED | OUTPATIENT
Start: 2018-09-11 | End: 2018-12-20 | Stop reason: SDUPTHER

## 2018-09-11 RX ORDER — ALBUTEROL SULFATE 90 UG/1
2 AEROSOL, METERED RESPIRATORY (INHALATION)
Qty: 1 INHALER | Refills: 3 | Status: SHIPPED | OUTPATIENT
Start: 2018-09-11 | End: 2018-10-31 | Stop reason: SDUPTHER

## 2018-09-11 NOTE — MR AVS SNAPSHOT
[address] [facility] 
 
 
 [address] [facility] 83 [address] 
[phone] Patient: Mei Salmon MRN: [id number] :1960 Visit Information Date & Time Provider Department Dept. Phone Encounter #  
 2018  1:45 PM [id number], 5501 [facility] [phone] [id number] Follow-up Instructions Return in about 3 months (around 2018). Your Appointments 10/12/2018  9:45 AM  
ROUTINE CARE with [id number] MD  
[facility]) Appt Note: Return in 3 months [address] [facility] [address] [facility] 83 222 [facility]  
  
   
 [address] Upcoming Health Maintenance Date Due  
 EYE EXAM RETINAL OR DILATED Q1 3/8/1972 Pneumococcal 19-64 Highest Risk (2 of 3 - PCV13) 2017 Influenza Age 5 to Adult 2018 HEMOGLOBIN A1C Q6M 2018 MICROALBUMIN Q1 2019 LIPID PANEL Q1 2019 MEDICARE YEARLY EXAM 2019 FOOT EXAM Q1 2019 COLONOSCOPY 2022 DTaP/Tdap/Td series (2 - Td) 2025 Allergies as of 2018  Review Complete On: 2018 By: Rebecca Vital LPN Severity Noted Reaction Type Reactions Lisinopril  2016    Swelling Current Immunizations  Never Reviewed Name Date Influenza Vaccine 2015  9:38 AM  
 Influenza Vaccine (Quad) PF 2017  9:37 AM, 10/28/2016 Pneumococcal Polysaccharide (PPSV-23) 2016 Tdap 2015  9:39 AM  
  
 Not reviewed this visit You Were Diagnosed With   
  
 Codes Comments Midline low back pain with right-sided sciatica, unspecified chronicity    -  Primary ICD-10-CM: M54.41 
ICD-9-CM: 724.3 Other emphysema ([facility] [address]     ICD-10-CM: J43.8 ICD-9-CM: 492.8 Pulmonary emphysema, unspecified emphysema type ([address]     ICD-10-CM: J43.9 ICD-9-CM: 492.8 Cough     ICD-10-CM: R05 ICD-9-CM: 786.2 Type 2 diabetes mellitus with nephropathy (HCC)     ICD-10-CM: E11.21 
ICD-9-CM: 250.40, 583.81 Hypercholesteremia     ICD-10-CM: E78.00 ICD-9-CM: 272.0 Vitals BP Pulse Temp Resp Height(growth percentile) Weight(growth percentile) 121/75 68 98 °F (36.7 °C) (Oral) 18 5' 9\" (1.753 m) 202 lb (91.6 kg) SpO2 BMI Smoking Status 94% 29.83 kg/m2 Current Every Day Smoker Vitals History BMI and BSA Data Body Mass Index Body Surface Area  
 29.83 kg/m 2 2.11 m 2 Preferred Pharmacy Pharmacy Name Phone [facility] [address] [place] [address] [phone] Your Updated Medication List  
  
   
This list is accurate as of 9/11/18  2:44 PM.  Always use your most recent med list.  
  
  
  
  
 albuterol 90 mcg/actuation inhaler Commonly known as:  PROVENTIL HFA, VENTOLIN HFA, PROAIR HFA Take 2 Puffs by inhalation every six (6) hours as needed for Wheezing. amLODIPine 10 mg tablet Commonly known as:  [place] Jackeline Take 10 mg by mouth daily. aspirin 81 mg chewable tablet Take 1 Tab by mouth daily. carboxymethylcellulose sodium 0.5 % Drop ophthalmic solution Commonly known as:  REFRESH TEARS Administer 1 Drop to both eyes two (2) times a day. carvedilol 12.5 mg tablet Commonly known as:  Wileen Dong Take 1 Tab by mouth two (2) times daily (with meals). cetirizine 10 mg tablet Commonly known as:  ZYRTEC Take 1 Tab by mouth daily. cholecalciferol 1,000 unit tablet Commonly known as:  VITAMIN D3 Take 1,000 Units by mouth daily. clobetasol 0.05 % ointment Commonly known as:  Ulysses García Apply  to affected area two (2) times a day. clotrimazole 1 % topical cream  
Commonly known as:  Delroy Durie Apply  to affected area two (2) times a day. fluticasone 50 mcg/actuation nasal spray Commonly known as:  Radha Gut 2 Sprays by Both Nostrils route daily. fluticasone-salmeterol 250-50 mcg/dose diskus inhaler Commonly known as:  ADVAIR DISKUS Take 1 Puff by inhalation every twelve (12) hours. furosemide 40 mg tablet Commonly known as:  LASIX Take 1 Tab by mouth daily. gabapentin 300 mg capsule Commonly known as:  NEURONTIN Take 1 Cap by mouth four (4) times daily. glipiZIDE 10 mg tablet Commonly known as:  Rumalda Yulia Take 10 mg by mouth three (3) times daily. hydroCHLOROthiazide 25 mg tablet Commonly known as:  HYDRODIURIL Take 1 Tab by mouth daily. losartan 50 mg tablet Commonly known as:  COZAAR Take 1 Tab by mouth daily. metFORMIN 1,000 mg tablet Commonly known as:  GLUCOPHAGE Take 1,000 mg by mouth two (2) times daily (with meals). omega-3 fatty acids-vitamin e 1,000 mg Cap Commonly known as:  FISH OIL Take 1 Cap by mouth two (2) times a day. pantoprazole 40 mg tablet Commonly known as:  PROTONIX Take 1 Tab by mouth daily. pravastatin 40 mg tablet Commonly known as:  PRAVACHOL Take 1.5 Tabs by mouth nightly. Take one-half tablet by mouth at bedtime  
  
 traMADol 50 mg tablet Commonly known as:  ULTRAM  
Take 1 Tab by mouth every eight (8) hours as needed for Pain. Max Daily Amount: 150 mg.  
  
  
  
  
Prescriptions Printed Refills  
 traMADol (ULTRAM) 50 mg tablet 0 Sig: Take 1 Tab by mouth every eight (8) hours as needed for Pain. Max Daily Amount: 150 mg.  
 Class: Print Route: Oral  
  
Prescriptions Sent to Pharmacy Refills  
 albuterol (PROVENTIL HFA, VENTOLIN HFA, PROAIR HFA) 90 mcg/actuation inhaler 3 Sig: Take 2 Puffs by inhalation every six (6) hours as needed for Wheezing. Class: Normal  
 Pharmacy: [facility] [address] ACEVEDO [address] [place] [address]. Ph #: [phone]  Route: Inhalation  
 fluticasone-salmeterol (ADVAIR DISKUS) 250-50 mcg/dose diskus inhaler 3  
 Sig: Take 1 Puff by inhalation every twelve (12) hours. Class: Normal  
 Pharmacy: [address] [facility], [address] [place] [address] [facility]  #: [phone] Route: Inhalation Follow-up Instructions Return in about 3 months (around 12/11/2018). To-Do List   
 09/11/2018 Lab:  CBC WITH AUTOMATED DIFF   
  
 09/11/2018 Lab:  HEMOGLOBIN A1C WITH EAG   
  
 09/11/2018 Lab:  LIPID PANEL   
  
 09/11/2018 Lab:  METABOLIC PANEL, COMPREHENSIVE   
  
 09/11/2018 Lab:  Lilly Cost 13 (MW)   
  
 09/11/2018 Imaging:  XR CHEST PA LAT Introducing [facility] SERVICES! Dear Elias Erps: Thank you for requesting a myEDmatch account. Our records indicate that you already have an active myEDmatch account. You can access your account anytime at [email or web address] Did you know that you can access your hospital and ER discharge instructions at any time in myEDmatch? You can also review all of your test results from your hospital stay or ER visit. Additional Information If you have questions, please visit the Frequently Asked Questions section of the myEDmatch website at [email or web address] Remember, myEDmatch is NOT to be used for urgent needs. For medical emergencies, dial 911. Now available from your iPhone and Android! Please provide this summary of care documentation to your next provider. Your primary care clinician is listed as [id number]. If you have any questions after today's visit, please call [phone].

## 2018-09-11 NOTE — PROGRESS NOTES
Rebecca Babb is a 66 y.o.  male and presents with     Chief Complaint   Patient presents with    Diabetes    Cough    Hypertension    Cholesterol Problem    Back Pain    Snoring       Pt says his back has been hurting. He is requesting tramadol. Pt also needs refills on his inhalers. He is taking meds for DM, HTN and hyperchol. Past Medical History:   Diagnosis Date    Back pain     Chronic obstructive pulmonary disease ([address]     Diabetes ([address]     Hypercholesterolemia     Hypertension     Sleep apnea     Does not use CPAP     Past Surgical History:   Procedure Laterality Date    COLONOSCOPY N/A 1/23/2017    COLONOSCOPY performed by Rashard Roberts MD at [address] [place] [address]  2003    umbilacal     HX SHOULDER ARTHROSCOPY  2004    bilateral     Current Outpatient Prescriptions   Medication Sig    albuterol (PROVENTIL HFA, VENTOLIN HFA, PROAIR HFA) 90 mcg/actuation inhaler Take 2 Puffs by inhalation every six (6) hours as needed for Wheezing.  fluticasone-salmeterol (ADVAIR DISKUS) 250-50 mcg/dose diskus inhaler Take 1 Puff by inhalation every twelve (12) hours.  traMADol (ULTRAM) 50 mg tablet Take 1 Tab by mouth every eight (8) hours as needed for Pain. Max Daily Amount: 150 mg.    furosemide (LASIX) 40 mg tablet Take 1 Tab by mouth daily.  aspirin 81 mg chewable tablet Take 1 Tab by mouth daily.  cetirizine (ZYRTEC) 10 mg tablet Take 1 Tab by mouth daily.  pantoprazole (PROTONIX) 40 mg tablet Take 1 Tab by mouth daily.  carvedilol (COREG) 12.5 mg tablet Take 1 Tab by mouth two (2) times daily (with meals).  losartan (COZAAR) 50 mg tablet Take 1 Tab by mouth daily.  fluticasone (FLONASE) 50 mcg/actuation nasal spray 2 Sprays by Both Nostrils route daily.  gabapentin (NEURONTIN) 300 mg capsule Take 1 Cap by mouth four (4) times daily.  clotrimazole (LOTRIMIN) 1 % topical cream Apply  to affected area two (2) times a day.     hydroCHLOROthiazide (HYDRODIURIL) 25 mg tablet Take 1 Tab by mouth daily.  pravastatin (PRAVACHOL) 40 mg tablet Take 1.5 Tabs by mouth nightly. Take one-half tablet by mouth at bedtime    carboxymethylcellulose sodium (REFRESH TEARS) 0.5 % drop ophthalmic solution Administer 1 Drop to both eyes two (2) times a day.  clobetasol (TEMOVATE) 0.05 % ointment Apply  to affected area two (2) times a day.  cholecalciferol (VITAMIN D3) 1,000 unit tablet Take 1,000 Units by mouth daily.  omega-3 fatty acids-vitamin e (FISH OIL) 1,000 mg cap Take 1 Cap by mouth two (2) times a day.  metFORMIN (GLUCOPHAGE) 1,000 mg tablet Take 1,000 mg by mouth two (2) times daily (with meals).  glipiZIDE (GLUCOTROL) 10 mg tablet Take 10 mg by mouth three (3) times daily.  amLODIPine (NORVASC) 10 mg tablet Take 10 mg by mouth daily. No current facility-administered medications for this visit. Health Maintenance   Topic Date Due    EYE EXAM RETINAL OR DILATED Q1  03/08/1972    Pneumococcal 19-64 Highest Risk (2 of 3 - PCV13) 07/27/2017    Influenza Age 9 to Adult  08/01/2018    HEMOGLOBIN A1C Q6M  11/08/2018    MICROALBUMIN Q1  05/08/2019    LIPID PANEL Q1  05/08/2019    MEDICARE YEARLY EXAM  06/09/2019    FOOT EXAM Q1  06/21/2019    COLONOSCOPY  01/23/2022    DTaP/Tdap/Td series (2 - Td) 09/17/2025    Hepatitis C Screening  Completed     Immunization History   Administered Date(s) Administered    Influenza Vaccine 09/17/2015    Influenza Vaccine (Quad) PF 10/28/2016, 12/22/2017    Pneumococcal Polysaccharide (PPSV-23) 07/27/2016    Tdap 09/17/2015     No LMP for male patient. Allergies and Intolerances:    Allergies   Allergen Reactions    Lisinopril Swelling       Family History:   Family History   Problem Relation Age of Onset    Cancer Mother     Hypertension Mother     Heart Disease Mother     Diabetes Mother     No Known Problems Father        Social History:   He  reports that he has been smoking Cigarettes. He has a 12.50 pack-year smoking history. He quit smokeless tobacco use about 2 years ago. He  reports that he drinks about 2.4 oz of alcohol per week             Review of Systems:   General: negative for - chills, fatigue, fever, weight change  Psych: negative for - anxiety, depression, irritability or mood swings  ENT: negative for - headaches, hearing change, nasal congestion, oral lesions, sneezing or sore throat  Heme/ Lymph: negative for - bleeding problems, bruising, pallor or swollen lymph nodes  Endo: negative for - hot flashes, polydipsia/polyuria or temperature intolerance  Resp: negative for - cough, shortness of breath or wheezing  CV: negative for - chest pain, edema or palpitations  GI: negative for - abdominal pain, change in bowel habits, constipation, diarrhea or nausea/vomiting  : negative for - dysuria, hematuria, incontinence, pelvic pain or vulvar/vaginal symptoms  MSK: negative for - joint pain, joint swelling or muscle pain, pos for back pain  Neuro: negative for - confusion, headaches, seizures or weakness  Derm: negative for - dry skin, hair changes, rash or skin lesion changes          Physical:   Vitals:   Vitals:    09/11/18 1414   BP: 121/75   Pulse: 68   Resp: 18   Temp: 98 °F (36.7 °C)   TempSrc: Oral   SpO2: 94%   Weight: 202 lb (91.6 kg)   Height: 5' 9\" (1.753 m)           Exam:   HEENT- atraumatic,normocephalic, awake, oriented, well nourished  Neck - supple,no enlarged lymph nodes, no JVD, no thyromegaly  Chest- CTA, no rhonchi, no crackles  Heart- rrr, no murmurs / gallop/rub  Abdomen- soft,BS+,NT, no hepatosplenomegaly  Ext - no c/c/edema   Neuro- no focal deficits. Power 5/5 all extremities  Skin - warm,dry, no obvious rashes.           Review of Data:   LABS:   Lab Results   Component Value Date/Time    WBC 7.1 05/08/2018 10:49 AM    HGB 15.0 05/08/2018 10:49 AM    HCT 46.3 05/08/2018 10:49 AM    PLATELET 295 95/79/2488 10:49 AM     Lab Results Component Value Date/Time    Sodium 140 05/08/2018 10:49 AM    Potassium 4.6 05/08/2018 10:49 AM    Chloride 103 05/08/2018 10:49 AM    CO2 30 05/08/2018 10:49 AM    Glucose 126 (H) 05/08/2018 10:49 AM    BUN 9 05/08/2018 10:49 AM    Creatinine 0.95 05/08/2018 10:49 AM     Lab Results   Component Value Date/Time    Cholesterol, total 195 05/08/2018 10:49 AM    HDL Cholesterol 51 05/08/2018 10:49 AM    LDL, calculated 98.4 05/08/2018 10:49 AM    Triglyceride 228 (H) 05/08/2018 10:49 AM     No results found for: GPT        Impression / Plan:        ICD-10-CM ICD-9-CM    1. Midline low back pain with right-sided sciatica, unspecified chronicity M54.41 724.3 traMADol (ULTRAM) 50 mg tablet      TOXASSURE SELECT 13 (MW)   2. Other emphysema (HCC) J43.8 492.8 albuterol (PROVENTIL HFA, VENTOLIN HFA, PROAIR HFA) 90 mcg/actuation inhaler   3. Pulmonary emphysema, unspecified emphysema type ([address] J43.9 492.8 fluticasone-salmeterol (ADVAIR DISKUS) 250-50 mcg/dose diskus inhaler   4. Cough R05 786.2 XR CHEST PA LAT   5. Type 2 diabetes mellitus with nephropathy (HCC) E11.21 250.40 HEMOGLOBIN A1C WITH EAG     583.81 CBC WITH AUTOMATED DIFF      METABOLIC PANEL, COMPREHENSIVE      LIPID PANEL   6. Hypercholesteremia E78.00 272.0      DM/HTn/hyperchol - stable           reviewed      Explained to patient risk benefits of the medications. Advised patient to stop meds if having any side effects. Pt verbalized understanding of the instructions. I have discussed the diagnosis with the patient and the intended plan as seen in the above orders. The patient has received an after-visit summary and questions were answered concerning future plans. I have discussed medication side effects and warnings with the patient as well. I have reviewed the plan of care with the patient, accepted their input and they are in agreement with the treatment goals. Reviewed plan of care.  Patient has provided input and agrees with goals.    Follow-up Disposition: Not on Emil Maria MD

## 2018-09-13 ENCOUNTER — TELEPHONE (OUTPATIENT)
Dept: FAMILY MEDICINE CLINIC | Age: 58
End: 2018-09-13

## 2018-09-13 DIAGNOSIS — N28.9 RENAL INSUFFICIENCY: Primary | ICD-10-CM

## 2018-09-13 NOTE — LETTER
9/27/2018 8:45 AM 
 
[facility] [id number] [phone] Dear Mr. Narvaezis Brandon, We have been unable to reach you by phone to notify you of your test results. Please call our office at [phone] and ask to speak with my nurse in order to explain these results to you and advise you of any recommendations.  
 
 
 
Sincerely, 
 
 
Magdalena Brown MD

## 2018-09-24 ENCOUNTER — TELEPHONE (OUTPATIENT)
Dept: FAMILY MEDICINE CLINIC | Age: 58
End: 2018-09-24

## 2018-09-27 ENCOUNTER — TELEPHONE (OUTPATIENT)
Dept: FAMILY MEDICINE CLINIC | Age: 58
End: 2018-09-27

## 2018-09-27 NOTE — TELEPHONE ENCOUNTER
Will ask pt to prepone visit to next week to discuss labs. Pt needs to bring all his meds for the visit, very important.

## 2018-10-03 ENCOUNTER — TELEPHONE (OUTPATIENT)
Dept: FAMILY MEDICINE CLINIC | Age: 58
End: 2018-10-03

## 2018-10-03 NOTE — TELEPHONE ENCOUNTER
Pt will come in on Oct 19 for lab review and will get labwork done on Oct 17. This encounter will be closed.

## 2018-10-03 NOTE — TELEPHONE ENCOUNTER
Patient called regarding medication he is suppose to stop taking. He does not know the name of it. Please call back.

## 2018-10-04 NOTE — TELEPHONE ENCOUNTER
called and spoke with patient and he wanted to verify that hctz is the one he shouldn't be taking. I informed him that that is correct medication and to make sure he does BMP a few days before coming to next visit and reminded him to bring meds.

## 2018-10-19 ENCOUNTER — HOSPITAL ENCOUNTER (OUTPATIENT)
Dept: LAB | Age: 58
Discharge: HOME | End: 2018-10-19
Payer: MEDICARE

## 2018-10-19 DIAGNOSIS — M54.41 MIDLINE LOW BACK PAIN WITH RIGHT-SIDED SCIATICA, UNSPECIFIED CHRONICITY: ICD-10-CM

## 2018-10-19 DIAGNOSIS — N28.9 RENAL INSUFFICIENCY: ICD-10-CM

## 2018-10-19 LAB
ANION GAP SERPL CALC-SCNC: 10 MMOL/L (ref 3–18)
BUN SERPL-MCNC: 12 MG/DL (ref 7–18)
BUN/CREAT SERPL: 13 (ref 12–20)
CALCIUM SERPL-MCNC: 8.6 MG/DL (ref 8.5–10.1)
CHLORIDE SERPL-SCNC: 97 MMOL/L (ref 100–108)
CO2 SERPL-SCNC: 29 MMOL/L (ref 21–32)
CREAT SERPL-MCNC: 0.92 MG/DL (ref 0.6–1.3)
GLUCOSE SERPL-MCNC: 137 MG/DL (ref 74–99)
POTASSIUM SERPL-SCNC: 4 MMOL/L (ref 3.5–5.5)
SODIUM SERPL-SCNC: 136 MMOL/L (ref 136–145)

## 2018-10-19 PROCEDURE — 80048 BASIC METABOLIC PNL TOTAL CA: CPT | Performed by: INTERNAL MEDICINE

## 2018-10-19 PROCEDURE — 36415 COLL VENOUS BLD VENIPUNCTURE: CPT | Performed by: INTERNAL MEDICINE

## 2018-10-19 PROCEDURE — G0480 DRUG TEST DEF 1-7 CLASSES: HCPCS | Performed by: INTERNAL MEDICINE

## 2018-10-29 LAB — DRUGS UR: NORMAL

## 2018-10-31 ENCOUNTER — TELEPHONE (OUTPATIENT)
Dept: FAMILY MEDICINE CLINIC | Age: 58
End: 2018-10-31

## 2018-10-31 ENCOUNTER — OFFICE VISIT (OUTPATIENT)
Dept: FAMILY MEDICINE CLINIC | Age: 58
End: 2018-10-31

## 2018-10-31 ENCOUNTER — HOSPITAL ENCOUNTER (OUTPATIENT)
Dept: GENERAL RADIOLOGY | Age: 58
Discharge: HOME | End: 2018-10-31
Payer: MEDICARE

## 2018-10-31 VITALS
TEMPERATURE: 97.7 F | HEART RATE: 91 BPM | BODY MASS INDEX: 31.16 KG/M2 | OXYGEN SATURATION: 85 % | SYSTOLIC BLOOD PRESSURE: 132 MMHG | RESPIRATION RATE: 18 BRPM | WEIGHT: 210.4 LBS | HEIGHT: 69 IN | DIASTOLIC BLOOD PRESSURE: 81 MMHG

## 2018-10-31 DIAGNOSIS — G89.29 CHRONIC RIGHT-SIDED LOW BACK PAIN WITH RIGHT-SIDED SCIATICA: Primary | ICD-10-CM

## 2018-10-31 DIAGNOSIS — M79.641 PAIN OF RIGHT HAND: ICD-10-CM

## 2018-10-31 DIAGNOSIS — Z23 ENCOUNTER FOR IMMUNIZATION: ICD-10-CM

## 2018-10-31 DIAGNOSIS — M54.41 MIDLINE LOW BACK PAIN WITH RIGHT-SIDED SCIATICA, UNSPECIFIED CHRONICITY: ICD-10-CM

## 2018-10-31 DIAGNOSIS — M54.41 CHRONIC RIGHT-SIDED LOW BACK PAIN WITH RIGHT-SIDED SCIATICA: Primary | ICD-10-CM

## 2018-10-31 DIAGNOSIS — J43.8 OTHER EMPHYSEMA (HCC): ICD-10-CM

## 2018-10-31 DIAGNOSIS — R05.9 COUGH: ICD-10-CM

## 2018-10-31 PROCEDURE — 73120 X-RAY EXAM OF HAND: CPT

## 2018-10-31 PROCEDURE — 71046 X-RAY EXAM CHEST 2 VIEWS: CPT

## 2018-10-31 RX ORDER — TRAMADOL HYDROCHLORIDE 50 MG/1
50 TABLET ORAL
Qty: 40 TAB | Refills: 0 | Status: SHIPPED | OUTPATIENT
Start: 2018-10-31 | End: 2019-04-23

## 2018-10-31 RX ORDER — ALBUTEROL SULFATE 90 UG/1
2 AEROSOL, METERED RESPIRATORY (INHALATION)
Qty: 1 INHALER | Refills: 3 | Status: SHIPPED | OUTPATIENT
Start: 2018-10-31 | End: 2019-01-22 | Stop reason: SDUPTHER

## 2018-10-31 RX ORDER — FLUTICASONE PROPIONATE AND SALMETEROL 250; 50 UG/1; UG/1
1 POWDER RESPIRATORY (INHALATION) EVERY 12 HOURS
Qty: 1 INHALER | Refills: 3 | Status: SHIPPED | OUTPATIENT
Start: 2018-10-31 | End: 2019-03-07 | Stop reason: SDUPTHER

## 2018-10-31 RX ORDER — ALBUTEROL SULFATE 90 UG/1
2 AEROSOL, METERED RESPIRATORY (INHALATION)
Qty: 1 INHALER | Refills: 12 | Status: SHIPPED | OUTPATIENT
Start: 2018-10-31 | End: 2019-03-07 | Stop reason: SDUPTHER

## 2018-10-31 NOTE — PROGRESS NOTES
Manpreet Medina is a 66 y.o.  male and presents with Chief Complaint Patient presents with  Referral Follow Up  
  request for referral   
 Shortness of Breath  COPD  Back Pain  Hypertension  Hand Pain Pt has chronic back pain and wants to be referred to Dr Angel Khalil , sports med, for injections in his back. Pt does smoke. He is cuttting down on smoking. He does have some SOB on exertion. He has not picked meds from pharmacy Pt stopped  HCTZ as advised . Renal function has improved and BP has remained normal 
Pt ha pain in his rt hand Past Medical History:  
Diagnosis Date  Back pain  Chronic obstructive pulmonary disease ([facility]  Diabetes ([facility] [facility]  Hypercholesterolemia  Hypertension  Sleep apnea Does not use CPAP Past Surgical History:  
Procedure Laterality Date  HX HERNIA REPAIR  2003  
 umbilacal   
 HX SHOULDER ARTHROSCOPY  2004  
 bilateral  
 
Current Outpatient Medications Medication Sig  
 albuterol (PROVENTIL HFA, VENTOLIN HFA, PROAIR HFA) 90 mcg/actuation inhaler Take 2 Puffs by inhalation every six (6) hours as needed for Wheezing or Shortness of Breath.  albuterol (PROVENTIL HFA, VENTOLIN HFA, PROAIR HFA) 90 mcg/actuation inhaler Take 2 Puffs by inhalation every six (6) hours as needed for Wheezing.  fluticasone-salmeterol (ADVAIR) 250-50 mcg/dose diskus inhaler Take 1 Puff by inhalation every twelve (12) hours.  traMADol (ULTRAM) 50 mg tablet Take 1 Tab by mouth every eight (8) hours as needed for Pain. Max Daily Amount: 150 mg.  
 fluticasone-salmeterol (ADVAIR DISKUS) 250-50 mcg/dose diskus inhaler Take 1 Puff by inhalation every twelve (12) hours.  aspirin 81 mg chewable tablet Take 1 Tab by mouth daily.  cetirizine (ZYRTEC) 10 mg tablet Take 1 Tab by mouth daily.  pantoprazole (PROTONIX) 40 mg tablet Take 1 Tab by mouth daily.   
 carvedilol (COREG) 12.5 mg tablet Take 1 Tab by mouth two (2) times daily (with meals).  losartan (COZAAR) 50 mg tablet Take 1 Tab by mouth daily.  fluticasone (FLONASE) 50 mcg/actuation nasal spray 2 Sprays by Both Nostrils route daily.  gabapentin (NEURONTIN) 300 mg capsule Take 1 Cap by mouth four (4) times daily.  clotrimazole (LOTRIMIN) 1 % topical cream Apply  to affected area two (2) times a day.  hydroCHLOROthiazide (HYDRODIURIL) 25 mg tablet Take 1 Tab by mouth daily.  pravastatin (PRAVACHOL) 40 mg tablet Take 1.5 Tabs by mouth nightly. Take one-half tablet by mouth at bedtime  carboxymethylcellulose sodium (REFRESH TEARS) 0.5 % drop ophthalmic solution Administer 1 Drop to both eyes two (2) times a day.  clobetasol (TEMOVATE) 0.05 % ointment Apply  to affected area two (2) times a day.  cholecalciferol (VITAMIN D3) 1,000 unit tablet Take 1,000 Units by mouth daily.  omega-3 fatty acids-vitamin e (FISH OIL) 1,000 mg cap Take 1 Cap by mouth two (2) times a day.  metFORMIN (GLUCOPHAGE) 1,000 mg tablet Take 1,000 mg by mouth two (2) times daily (with meals).  glipiZIDE (GLUCOTROL) 10 mg tablet Take 10 mg by mouth three (3) times daily.  amLODIPine (NORVASC) 10 mg tablet Take 10 mg by mouth daily.  furosemide (LASIX) 40 mg tablet Take 1 Tab by mouth daily. No current facility-administered medications for this visit. Health Maintenance Topic Date Due  
 EYE EXAM RETINAL OR DILATED Q1  03/08/1972  Shingrix Vaccine Age 50> (1 of 2) 03/08/2012  Pneumococcal 19-64 Highest Risk (2 of 3 - PCV13) 07/27/2017  Influenza Age 5 to Adult  08/01/2018  HEMOGLOBIN A1C Q6M  03/11/2019  MICROALBUMIN Q1  05/08/2019  MEDICARE YEARLY EXAM  06/09/2019  
 FOOT EXAM Q1  06/21/2019  LIPID PANEL Q1  09/11/2019  COLONOSCOPY  01/23/2022  DTaP/Tdap/Td series (2 - Td) 09/17/2025  Hepatitis C Screening  Completed Immunization History Administered Date(s) Administered  Influenza Vaccine 09/17/2015  Influenza Vaccine (Quad) PF 10/28/2016, 12/22/2017  Pneumococcal Polysaccharide (PPSV-23) 07/27/2016  Tdap 09/17/2015 No LMP for male patient. Allergies and Intolerances: Allergies Allergen Reactions  Lisinopril Swelling Family History:  
Family History Problem Relation Age of Onset  Cancer Mother  Hypertension Mother  Heart Disease Mother  Diabetes Mother  No Known Problems Father Social History: He  reports that he has been smoking cigarettes. He has a 12.50 pack-year smoking history. He quit smokeless tobacco use about 2 years ago. He  reports that he drinks about 2.4 oz of alcohol per week. Review of Systems:  
General: negative for - chills, fatigue, fever, weight change Psych: negative for - anxiety, depression, irritability or mood swings ENT: negative for - headaches, hearing change, nasal congestion, oral lesions, sneezing or sore throat Heme/ Lymph: negative for - bleeding problems, bruising, pallor or swollen lymph nodes Endo: negative for - hot flashes, polydipsia/polyuria or temperature intolerance Resp: negative for - cough, shortness of breath or wheezing CV: negative for - chest pain, edema or palpitations GI: negative for - abdominal pain, change in bowel habits, constipation, diarrhea or nausea/vomiting : negative for - dysuria, hematuria, incontinence, pelvic pain or vulvar/vaginal symptoms MSK: negative for - joint pain, joint swelling or muscle pain Neuro: negative for - confusion, headaches, seizures or weakness Derm: negative for - dry skin, hair changes, rash or skin lesion changes Physical:  
Vitals:  
Vitals:  
 10/31/18 0910 BP: 132/81 Pulse: 91  
Resp: 18 Temp: 97.7 °F (36.5 °C) TempSrc: Oral  
SpO2: (!) 85% Weight: 210 lb 6.4 oz (95.4 kg) Height: 5' 9\" (1.753 m) Exam:  
HEENT- atraumatic,normocephalic, awake, oriented, well nourished Neck - supple,no enlarged lymph nodes, no JVD, no thyromegaly Chest- CTA, no rhonchi, no crackles Heart- rrr, no murmurs / gallop/rub Abdomen- soft,BS+,NT, no hepatosplenomegaly Ext - no c/c/edema Neuro- no focal deficits. Power 5/5 all extremities Skin - warm,dry, no obvious rashes. Review of Data:  
LABS:  
Lab Results Component Value Date/Time WBC 8.5 09/11/2018 02:53 PM  
 HGB 14.7 09/11/2018 02:53 PM  
 HCT 46.9 09/11/2018 02:53 PM  
 PLATELET 621 85/87/1370 02:53 PM  
 
Lab Results Component Value Date/Time Sodium 136 10/19/2018 11:53 AM  
 Potassium 4.0 10/19/2018 11:53 AM  
 Chloride 97 (L) 10/19/2018 11:53 AM  
 CO2 29 10/19/2018 11:53 AM  
 Glucose 137 (H) 10/19/2018 11:53 AM  
 BUN 12 10/19/2018 11:53 AM  
 Creatinine 0.92 10/19/2018 11:53 AM  
 
Lab Results Component Value Date/Time Cholesterol, total 194 09/11/2018 02:53 PM  
 HDL Cholesterol 37 (L) 09/11/2018 02:53 PM  
 LDL, calculated 82.8 09/11/2018 02:53 PM  
 Triglyceride 371 (H) 09/11/2018 02:53 PM  
 
No results found for: GPT Impression / Plan: ICD-10-CM ICD-9-CM 1. Chronic right-sided low back pain with right-sided sciatica M54.41 724.2 REFERRAL TO PAIN MANAGEMENT  
 G89.29 724.3   
  338.29   
2. Encounter for immunization Z23 V03.89 INFLUENZA VIRUS VAC QUAD,SPLIT,PRESV FREE SYRINGE IM  
3. Other emphysema (HCC) J43.8 492.8 albuterol (PROVENTIL HFA, VENTOLIN HFA, PROAIR HFA) 90 mcg/actuation inhaler  
   albuterol (PROVENTIL HFA, VENTOLIN HFA, PROAIR HFA) 90 mcg/actuation inhaler 4. Midline low back pain with right-sided sciatica, unspecified chronicity M54.41 724.3 traMADol (ULTRAM) 50 mg tablet 5. Pain of right hand M79.641 729.5 XR HAND RT AP/LAT  
 
 
HTN - well controlled Renal insuff - improved  reviewed Urine dryg screen pos for Marijuana, warned pt , if it is pos again in future , I will not be able to prescribe opiods . Explained to patient risk benefits of the medications. Advised patient to stop meds if having any side effects. Pt verbalized understanding of the instructions. I have discussed the diagnosis with the patient and the intended plan as seen in the above orders. The patient has received an after-visit summary and questions were answered concerning future plans. I have discussed medication side effects and warnings with the patient as well. I have reviewed the plan of care with the patient, accepted their input and they are in agreement with the treatment goals. Reviewed plan of care. Patient has provided input and agrees with goals. Follow-up Disposition: 
Return in about 3 months (around 1/31/2019).  
 
Madiha Iraheta MD

## 2018-10-31 NOTE — TELEPHONE ENCOUNTER
An order is needed for [place] epidural  steroid injection in order for referral to be completed, please advise.

## 2018-10-31 NOTE — TELEPHONE ENCOUNTER
Pt called in stating that his referral for an Epidural is incomplete so the appointment can not be made.  Please assist.

## 2018-10-31 NOTE — PROGRESS NOTES
1. Have you been to the ER, urgent care clinic since your last visit? Hospitalized since your last visit? No 
 
2. Have you seen or consulted any other health care providers outside of the [address] [place] [address] since your last visit? Include any pap smears or colon screening.  No

## 2018-11-01 ENCOUNTER — TELEPHONE (OUTPATIENT)
Dept: FAMILY MEDICINE CLINIC | Age: 58
End: 2018-11-01

## 2018-11-01 DIAGNOSIS — J15.9 BACTERIAL PNEUMONIA: Primary | ICD-10-CM

## 2018-11-01 RX ORDER — LEVOFLOXACIN 500 MG/1
500 TABLET, FILM COATED ORAL DAILY
Qty: 7 TAB | Refills: 0 | Status: SHIPPED | OUTPATIENT
Start: 2018-11-01 | End: 2018-11-11

## 2018-11-01 NOTE — TELEPHONE ENCOUNTER
Nurse called [phone] [facility]  Nurse called [phone] [facility] tp return call

## 2018-11-01 NOTE — TELEPHONE ENCOUNTER
Nurse advised patient of medication at pharmacy, also advised that clarification for pain management is pending. Pt verbalized understanding.

## 2018-11-01 NOTE — TELEPHONE ENCOUNTER
Pharmacy called regarding medication: levoFLOXacin that was prescribed today. They were wondering if the patient was already gave 3 pills at the office or not because the script says 7 pills for 10 days. They just need clarification. Please advise, thank you.

## 2018-11-01 NOTE — TELEPHONE ENCOUNTER
Debbie Krishna from [facility] wants to get a call back to clarify the patient's direction intake for levofloxacin/ levaquin medication. Asking to be called back.

## 2018-11-01 NOTE — TELEPHONE ENCOUNTER
Order clarified, per Dr. Martino Oz Take 1 tab by mouth daily for 7 days. Nurse advised pharmacy, this encounter will be closed.

## 2018-11-08 DIAGNOSIS — M54.50 CHRONIC MIDLINE LOW BACK PAIN WITHOUT SCIATICA: Primary | ICD-10-CM

## 2018-11-08 DIAGNOSIS — G89.29 CHRONIC MIDLINE LOW BACK PAIN WITHOUT SCIATICA: Primary | ICD-10-CM

## 2018-12-20 DIAGNOSIS — J43.9 PULMONARY EMPHYSEMA, UNSPECIFIED EMPHYSEMA TYPE (HCC): ICD-10-CM

## 2018-12-20 RX ORDER — FLUTICASONE PROPIONATE AND SALMETEROL 250; 50 UG/1; UG/1
1 POWDER RESPIRATORY (INHALATION) EVERY 12 HOURS
Qty: 1 INHALER | Refills: 3 | Status: SHIPPED | OUTPATIENT
Start: 2018-12-20 | End: 2019-01-22 | Stop reason: SDUPTHER

## 2018-12-20 NOTE — TELEPHONE ENCOUNTER
Requested Prescriptions     Pending Prescriptions Disp Refills    fluticasone-salmeterol (ADVAIR DISKUS) 250-50 mcg/dose diskus inhaler 1 Inhaler 3     Sig: Take 1 Puff by inhalation every twelve (12) hours. [facility]ing [facility]. Please advise, thank you.

## 2019-01-22 ENCOUNTER — OFFICE VISIT (OUTPATIENT)
Dept: FAMILY MEDICINE CLINIC | Age: 59
End: 2019-01-22

## 2019-01-22 ENCOUNTER — HOSPITAL ENCOUNTER (OUTPATIENT)
Dept: LAB | Age: 59
Discharge: HOME | End: 2019-01-22
Payer: MEDICARE

## 2019-01-22 ENCOUNTER — HOSPITAL ENCOUNTER (OUTPATIENT)
Dept: GENERAL RADIOLOGY | Age: 59
Discharge: HOME | End: 2019-01-22
Payer: MEDICARE

## 2019-01-22 VITALS
HEIGHT: 69 IN | SYSTOLIC BLOOD PRESSURE: 129 MMHG | RESPIRATION RATE: 16 BRPM | BODY MASS INDEX: 30.66 KG/M2 | WEIGHT: 207 LBS | TEMPERATURE: 97.2 F | HEART RATE: 86 BPM | OXYGEN SATURATION: 98 % | DIASTOLIC BLOOD PRESSURE: 75 MMHG

## 2019-01-22 DIAGNOSIS — E11.9 TYPE 2 DIABETES MELLITUS WITHOUT COMPLICATION, WITHOUT LONG-TERM CURRENT USE OF INSULIN (HCC): Primary | ICD-10-CM

## 2019-01-22 DIAGNOSIS — E78.00 HYPERCHOLESTEREMIA: ICD-10-CM

## 2019-01-22 DIAGNOSIS — J43.9 PULMONARY EMPHYSEMA, UNSPECIFIED EMPHYSEMA TYPE (HCC): ICD-10-CM

## 2019-01-22 DIAGNOSIS — J43.8 OTHER EMPHYSEMA (HCC): ICD-10-CM

## 2019-01-22 DIAGNOSIS — E11.9 TYPE 2 DIABETES MELLITUS WITHOUT COMPLICATION, WITHOUT LONG-TERM CURRENT USE OF INSULIN (HCC): ICD-10-CM

## 2019-01-22 DIAGNOSIS — I10 ESSENTIAL HYPERTENSION: ICD-10-CM

## 2019-01-22 LAB
CHOLEST SERPL-MCNC: 194 MG/DL
EST. AVERAGE GLUCOSE BLD GHB EST-MCNC: 200 MG/DL
HBA1C MFR BLD: 8.6 % (ref 4.2–5.6)
HDLC SERPL-MCNC: 46 MG/DL (ref 40–60)
HDLC SERPL: 4.2 {RATIO} (ref 0–5)
LDLC SERPL CALC-MCNC: 109.2 MG/DL (ref 0–100)
LIPID PROFILE,FLP: ABNORMAL
TRIGL SERPL-MCNC: 194 MG/DL (ref ?–150)
VLDLC SERPL CALC-MCNC: 38.8 MG/DL

## 2019-01-22 PROCEDURE — 36415 COLL VENOUS BLD VENIPUNCTURE: CPT

## 2019-01-22 PROCEDURE — 83036 HEMOGLOBIN GLYCOSYLATED A1C: CPT

## 2019-01-22 PROCEDURE — 80061 LIPID PANEL: CPT

## 2019-01-22 PROCEDURE — 71046 X-RAY EXAM CHEST 2 VIEWS: CPT

## 2019-01-22 RX ORDER — FLUTICASONE PROPIONATE AND SALMETEROL 250; 50 UG/1; UG/1
1 POWDER RESPIRATORY (INHALATION) EVERY 12 HOURS
Qty: 1 INHALER | Refills: 3 | Status: SHIPPED | OUTPATIENT
Start: 2019-01-22 | End: 2019-03-07 | Stop reason: SDUPTHER

## 2019-01-22 RX ORDER — ALBUTEROL SULFATE 90 UG/1
2 AEROSOL, METERED RESPIRATORY (INHALATION)
Qty: 1 INHALER | Refills: 3 | Status: SHIPPED | OUTPATIENT
Start: 2019-01-22 | End: 2019-03-07 | Stop reason: SDUPTHER

## 2019-01-22 NOTE — PROGRESS NOTES
Ileene Bamberger is a 66 y.o.  male and presents with     Chief Complaint   Patient presents with    Diabetes    Hypertension    Cholesterol Problem    Cough    Leg Swelling    Insomnia       Pt says he gets injections in his back and that is helping. He ha some trouble sleeping at night. He is taking meds for DM, HTN, hyperchol as directed,  Cough has improved. Past Medical History:   Diagnosis Date    Back pain     Chronic obstructive pulmonary disease ([address]     Diabetes ([address]     Hypercholesterolemia     Hypertension     Sleep apnea     Does not use CPAP     Past Surgical History:   Procedure Laterality Date    COLONOSCOPY N/A 1/23/2017    COLONOSCOPY performed by Sanford Clements MD at [address] [place] [address] St  2003    umbilacal     HX SHOULDER ARTHROSCOPY  2004    bilateral     Current Outpatient Medications   Medication Sig    albuterol (PROVENTIL HFA, VENTOLIN HFA, PROAIR HFA) 90 mcg/actuation inhaler Take 2 Puffs by inhalation every six (6) hours as needed for Wheezing.  fluticasone-salmeterol (ADVAIR DISKUS) 250-50 mcg/dose diskus inhaler Take 1 Puff by inhalation every twelve (12) hours.  albuterol (PROVENTIL HFA, VENTOLIN HFA, PROAIR HFA) 90 mcg/actuation inhaler Take 2 Puffs by inhalation every six (6) hours as needed for Wheezing or Shortness of Breath.  fluticasone-salmeterol (ADVAIR) 250-50 mcg/dose diskus inhaler Take 1 Puff by inhalation every twelve (12) hours.  traMADol (ULTRAM) 50 mg tablet Take 1 Tab by mouth every eight (8) hours as needed for Pain. Max Daily Amount: 150 mg.    furosemide (LASIX) 40 mg tablet Take 1 Tab by mouth daily.  aspirin 81 mg chewable tablet Take 1 Tab by mouth daily.  cetirizine (ZYRTEC) 10 mg tablet Take 1 Tab by mouth daily.  pantoprazole (PROTONIX) 40 mg tablet Take 1 Tab by mouth daily.  carvedilol (COREG) 12.5 mg tablet Take 1 Tab by mouth two (2) times daily (with meals).     losartan (COZAAR) 50 mg tablet Take 1 Tab by mouth daily.  fluticasone (FLONASE) 50 mcg/actuation nasal spray 2 Sprays by Both Nostrils route daily.  gabapentin (NEURONTIN) 300 mg capsule Take 1 Cap by mouth four (4) times daily.  clotrimazole (LOTRIMIN) 1 % topical cream Apply  to affected area two (2) times a day.  hydroCHLOROthiazide (HYDRODIURIL) 25 mg tablet Take 1 Tab by mouth daily.  pravastatin (PRAVACHOL) 40 mg tablet Take 1.5 Tabs by mouth nightly. Take one-half tablet by mouth at bedtime    carboxymethylcellulose sodium (REFRESH TEARS) 0.5 % drop ophthalmic solution Administer 1 Drop to both eyes two (2) times a day.  clobetasol (TEMOVATE) 0.05 % ointment Apply  to affected area two (2) times a day.  cholecalciferol (VITAMIN D3) 1,000 unit tablet Take 1,000 Units by mouth daily.  omega-3 fatty acids-vitamin e (FISH OIL) 1,000 mg cap Take 1 Cap by mouth two (2) times a day.  metFORMIN (GLUCOPHAGE) 1,000 mg tablet Take 1,000 mg by mouth two (2) times daily (with meals).  glipiZIDE (GLUCOTROL) 10 mg tablet Take 10 mg by mouth three (3) times daily.  amLODIPine (NORVASC) 10 mg tablet Take 10 mg by mouth daily. No current facility-administered medications for this visit.       Health Maintenance   Topic Date Due    EYE EXAM RETINAL OR DILATED  03/08/1972    Shingrix Vaccine Age 50> (1 of 2) 03/08/2012    Pneumococcal 19-64 Highest Risk (2 of 3 - PCV13) 07/27/2017    HEMOGLOBIN A1C Q6M  03/11/2019    MICROALBUMIN Q1  05/08/2019    MEDICARE YEARLY EXAM  06/09/2019    FOOT EXAM Q1  06/21/2019    LIPID PANEL Q1  09/11/2019    COLONOSCOPY  01/23/2022    DTaP/Tdap/Td series (2 - Td) 09/17/2025    Hepatitis C Screening  Completed    Influenza Age 5 to Adult  Completed     Immunization History   Administered Date(s) Administered    Influenza Vaccine 09/17/2015    Influenza Vaccine (Quad) PF 10/28/2016, 12/22/2017, 10/31/2018    Pneumococcal Polysaccharide (PPSV-23) 07/27/2016    Tdap 09/17/2015     No LMP for male patient. Allergies and Intolerances: Allergies   Allergen Reactions    Lisinopril Swelling       Family History:   Family History   Problem Relation Age of Onset    Cancer Mother     Hypertension Mother     Heart Disease Mother     Diabetes Mother     No Known Problems Father        Social History:   He  reports that he has been smoking cigarettes. He has a 12.50 pack-year smoking history. He quit smokeless tobacco use about 2 years ago. He  reports that he drinks about 2.4 oz of alcohol per week.             Review of Systems:   General: negative for - chills, fatigue, fever, weight change  Psych: negative for - anxiety, depression, irritability or mood swings  ENT: negative for - headaches, hearing change, nasal congestion, oral lesions, sneezing or sore throat  Heme/ Lymph: negative for - bleeding problems, bruising, pallor or swollen lymph nodes  Endo: negative for - hot flashes, polydipsia/polyuria or temperature intolerance  Resp: negative for - cough, shortness of breath or wheezing  CV: negative for - chest pain, edema or palpitations  GI: negative for - abdominal pain, change in bowel habits, constipation, diarrhea or nausea/vomiting  : negative for - dysuria, hematuria, incontinence, pelvic pain or vulvar/vaginal symptoms  MSK: negative for - joint pain, joint swelling or muscle pain  Neuro: negative for - confusion, headaches, seizures or weakness  Derm: negative for - dry skin, hair changes, rash or skin lesion changes          Physical:   Vitals:   Vitals:    01/22/19 1044   BP: 129/75   Pulse: 86   Resp: 16   Temp: 97.2 °F (36.2 °C)   TempSrc: Oral   SpO2: 98%   Weight: 207 lb (93.9 kg)   Height: 5' 9\" (1.753 m)           Exam:   HEENT- atraumatic,normocephalic, awake, oriented, well nourished  Neck - supple,no enlarged lymph nodes, no JVD, no thyromegaly  Chest- CTA, no rhonchi, no crackles  Heart- rrr, no murmurs / gallop/rub  Abdomen- soft,BS+,NT, no hepatosplenomegaly  Ext - no c/c/edema   Neuro- no focal deficits. Power 5/5 all extremities  Skin - warm,dry, no obvious rashes. Review of Data:   LABS:   Lab Results   Component Value Date/Time    WBC 8.5 09/11/2018 02:53 PM    HGB 14.7 09/11/2018 02:53 PM    HCT 46.9 09/11/2018 02:53 PM    PLATELET 117 61/82/9848 02:53 PM     Lab Results   Component Value Date/Time    Sodium 136 10/19/2018 11:53 AM    Potassium 4.0 10/19/2018 11:53 AM    Chloride 97 (L) 10/19/2018 11:53 AM    CO2 29 10/19/2018 11:53 AM    Glucose 137 (H) 10/19/2018 11:53 AM    BUN 12 10/19/2018 11:53 AM    Creatinine 0.92 10/19/2018 11:53 AM     Lab Results   Component Value Date/Time    Cholesterol, total 194 09/11/2018 02:53 PM    HDL Cholesterol 37 (L) 09/11/2018 02:53 PM    LDL, calculated 82.8 09/11/2018 02:53 PM    Triglyceride 371 (H) 09/11/2018 02:53 PM     No results found for: GPT        Impression / Plan:        ICD-10-CM ICD-9-CM    1. Type 2 diabetes mellitus without complication, without long-term current use of insulin (HCC) E11.9 250.00 HEMOGLOBIN A1C WITH EAG      LIPID PANEL   2. Other emphysema (HCC) J43.8 492.8 albuterol (PROVENTIL HFA, VENTOLIN HFA, PROAIR HFA) 90 mcg/actuation inhaler      XR CHEST PA LAT   3. Pulmonary emphysema, unspecified emphysema type (HCC) J43.9 492.8 fluticasone-salmeterol (ADVAIR DISKUS) 250-50 mcg/dose diskus inhaler      XR CHEST PA LAT   4. Essential hypertension I10 401.9    5. Hypercholesteremia E78.00 272.0 LIPID PANEL     DM/HTN/Hyperchol - stable        Insomnia - will try melatonin OTC . Explained to patient risk benefits of the medications. Advised patient to stop meds if having any side effects. Pt verbalized understanding of the instructions. I have discussed the diagnosis with the patient and the intended plan as seen in the above orders. The patient has received an after-visit summary and questions were answered concerning future plans.   I have discussed medication side effects and warnings with the patient as well. I have reviewed the plan of care with the patient, accepted their input and they are in agreement with the treatment goals. Reviewed plan of care. Patient has provided input and agrees with goals.     Follow-up Disposition: Not on Kaylin Almaraz MD

## 2019-01-22 NOTE — PROGRESS NOTES
1. Have you been to the ER, urgent care clinic since your last visit? Hospitalized since your last visit? No    2. Have you seen or consulted any other health care providers outside of the [address] [place] [address] since your last visit? Include any pap smears or colon screening.  No

## 2019-02-18 DIAGNOSIS — J30.2 OTHER SEASONAL ALLERGIC RHINITIS: ICD-10-CM

## 2019-02-18 RX ORDER — CETIRIZINE HCL 10 MG
TABLET ORAL
Qty: 30 TAB | Refills: 3 | Status: SHIPPED | OUTPATIENT
Start: 2019-02-18 | End: 2019-10-22 | Stop reason: SDUPTHER

## 2019-03-07 ENCOUNTER — OFFICE VISIT (OUTPATIENT)
Dept: FAMILY MEDICINE CLINIC | Age: 59
End: 2019-03-07

## 2019-03-07 VITALS
RESPIRATION RATE: 18 BRPM | HEIGHT: 69 IN | SYSTOLIC BLOOD PRESSURE: 135 MMHG | WEIGHT: 214 LBS | OXYGEN SATURATION: 84 % | BODY MASS INDEX: 31.7 KG/M2 | HEART RATE: 95 BPM | DIASTOLIC BLOOD PRESSURE: 79 MMHG | TEMPERATURE: 97.6 F

## 2019-03-07 DIAGNOSIS — J44.1 COPD WITH ACUTE EXACERBATION (HCC): Primary | ICD-10-CM

## 2019-03-07 DIAGNOSIS — R80.8 OTHER PROTEINURIA: ICD-10-CM

## 2019-03-07 DIAGNOSIS — E78.00 HYPERCHOLESTEREMIA: ICD-10-CM

## 2019-03-07 DIAGNOSIS — J43.8 OTHER EMPHYSEMA (HCC): ICD-10-CM

## 2019-03-07 DIAGNOSIS — G89.29 CHRONIC MIDLINE LOW BACK PAIN WITHOUT SCIATICA: ICD-10-CM

## 2019-03-07 DIAGNOSIS — M54.50 CHRONIC MIDLINE LOW BACK PAIN WITHOUT SCIATICA: ICD-10-CM

## 2019-03-07 DIAGNOSIS — I10 ESSENTIAL HYPERTENSION: ICD-10-CM

## 2019-03-07 DIAGNOSIS — E11.21 TYPE 2 DIABETES MELLITUS WITH NEPHROPATHY (HCC): ICD-10-CM

## 2019-03-07 DIAGNOSIS — E11.40 TYPE 2 DIABETES MELLITUS WITH DIABETIC NEUROPATHY, WITHOUT LONG-TERM CURRENT USE OF INSULIN (HCC): ICD-10-CM

## 2019-03-07 PROBLEM — N04.9 NEPHROTIC SYNDROME: Status: RESOLVED | Noted: 2018-07-12 | Resolved: 2019-03-07

## 2019-03-07 RX ORDER — ALBUTEROL SULFATE 90 UG/1
2 AEROSOL, METERED RESPIRATORY (INHALATION)
Qty: 1 INHALER | Refills: 3 | Status: SHIPPED | OUTPATIENT
Start: 2019-03-07 | End: 2019-11-15 | Stop reason: SDUPTHER

## 2019-03-07 RX ORDER — PREDNISONE 50 MG/1
50 TABLET ORAL DAILY
Qty: 7 TAB | Refills: 0 | Status: SHIPPED | OUTPATIENT
Start: 2019-03-07 | End: 2019-03-21 | Stop reason: ALTCHOICE

## 2019-03-07 RX ORDER — FLUTICASONE PROPIONATE AND SALMETEROL 250; 50 UG/1; UG/1
1 POWDER RESPIRATORY (INHALATION) EVERY 12 HOURS
Qty: 1 INHALER | Refills: 3 | Status: SHIPPED | OUTPATIENT
Start: 2019-03-07 | End: 2019-03-21 | Stop reason: ALTCHOICE

## 2019-03-07 RX ORDER — GUAIFENESIN 600 MG/1
600 TABLET, EXTENDED RELEASE ORAL 2 TIMES DAILY
Qty: 14 TAB | Refills: 0 | Status: SHIPPED | OUTPATIENT
Start: 2019-03-07 | End: 2019-03-14

## 2019-03-07 NOTE — PROGRESS NOTES
1. Have you been to the ER, urgent care clinic since your last visit? Hospitalized since your last visit? No    2. Have you seen or consulted any other health care providers outside of the [address] [place] [address] since your last visit? Include any pap smears or colon screening. No       Patient needs inhalers resent.

## 2019-03-07 NOTE — PROGRESS NOTES
History of Present Illness  Leidy Glynn is a 66 y.o. male who presents today for management of    Chief Complaint   Patient presents with    Headache     x 3 days, sinus    Hip Pain     Right hip pain when talking or putting pressure,     Cough       Patient is here to establish care. Previous PCP: Dr. Mckinley Magallanes    Patient complains of shortness of breath. He also complains of increased cough. He uses one pillow at night. Onset was one month ago. Patient has history of COPD. He has not taken his inhalers for about one month. Diabetes Mellitus:  He has diabetes mellitus, and  hypertension and hyperlipidemia. Diabetic ROS - medication compliance: compliant all of the time, diabetic diet compliance: compliant all of the time, home glucose monitoring: is performed regularly. Lab review: labs are reviewed, up to date and normal.     Cardiovascular Review:  The patient has hypertension and hyperlipidemia. Diet and Lifestyle: generally follows a low fat low cholesterol diet, generally follows a low sodium diet  Home BP Monitoring: is not measured at home. Pertinent ROS: taking medications as instructed, no medication side effects noted, no TIA's, no chest pain on exertion, no dyspnea on exertion, no swelling of ankles. Past Medical History  Past Medical History:   Diagnosis Date    Back pain     Chronic obstructive pulmonary disease ([facility]     Diabetes ([facility] [address] [facility]     Hypercholesterolemia     Hypertension     Sleep apnea     Does not use CPAP        Surgical History  Past Surgical History:   Procedure Laterality Date    COLONOSCOPY N/A 1/23/2017    COLONOSCOPY performed by Natty Small MD at [address] [place] [address]  2003    umbilacal     HX SHOULDER ARTHROSCOPY  2004    bilateral        Current Medications  Current Outpatient Medications   Medication Sig    predniSONE (DELTASONE) 50 mg tablet Take 1 Tab by mouth daily.     guaiFENesin ER (MUCINEX) 600 mg ER tablet Take 1 Tab by mouth two (2) times a day for 7 days.  cetirizine (ZYRTEC) 10 mg tablet TAKE 1 TABLET BY MOUTH ONCE DAILY    furosemide (LASIX) 40 mg tablet Take 1 Tab by mouth daily.  aspirin 81 mg chewable tablet Take 1 Tab by mouth daily.  pantoprazole (PROTONIX) 40 mg tablet Take 1 Tab by mouth daily.  carvedilol (COREG) 12.5 mg tablet Take 1 Tab by mouth two (2) times daily (with meals).  losartan (COZAAR) 50 mg tablet Take 1 Tab by mouth daily.  gabapentin (NEURONTIN) 300 mg capsule Take 1 Cap by mouth four (4) times daily.  hydroCHLOROthiazide (HYDRODIURIL) 25 mg tablet Take 1 Tab by mouth daily.  pravastatin (PRAVACHOL) 40 mg tablet Take 1.5 Tabs by mouth nightly. Take one-half tablet by mouth at bedtime    cholecalciferol (VITAMIN D3) 1,000 unit tablet Take 1,000 Units by mouth daily.  metFORMIN (GLUCOPHAGE) 1,000 mg tablet Take 1,000 mg by mouth two (2) times daily (with meals).  glipiZIDE (GLUCOTROL) 10 mg tablet Take 10 mg by mouth three (3) times daily.  amLODIPine (NORVASC) 10 mg tablet Take 10 mg by mouth daily.  albuterol (PROVENTIL HFA, VENTOLIN HFA, PROAIR HFA) 90 mcg/actuation inhaler Take 2 Puffs by inhalation every four (4) hours as needed for Wheezing or Shortness of Breath.  fluticasone-salmeterol (ADVAIR) 250-50 mcg/dose diskus inhaler Take 1 Puff by inhalation every twelve (12) hours.  traMADol (ULTRAM) 50 mg tablet Take 1 Tab by mouth every eight (8) hours as needed for Pain. Max Daily Amount: 150 mg.    fluticasone (FLONASE) 50 mcg/actuation nasal spray 2 Sprays by Both Nostrils route daily.  clotrimazole (LOTRIMIN) 1 % topical cream Apply  to affected area two (2) times a day.  carboxymethylcellulose sodium (REFRESH TEARS) 0.5 % drop ophthalmic solution Administer 1 Drop to both eyes two (2) times a day.  clobetasol (TEMOVATE) 0.05 % ointment Apply  to affected area two (2) times a day.     omega-3 fatty acids-vitamin e (FISH OIL) 1,000 mg cap Take 1 Cap by mouth two (2) times a day. No current facility-administered medications for this visit. Allergies/Drug Reactions  Allergies   Allergen Reactions    Lisinopril Swelling        Family History  Family History   Problem Relation Age of Onset   26 [facility] Mother     Hypertension Mother     Heart Disease Mother     Diabetes Mother     No Known Problems Father         Social History  Social History     Socioeconomic History    Marital status:      Spouse name: Not on file    Number of children: Not on file    Years of education: Not on file    Highest education level: Not on file   Social Needs    Financial resource strain: Not on file    Food insecurity - worry: Not on file    Food insecurity - inability: Not on file   [employer] needs - medical: Not on file   [employer] needs - non-medical: Not on file   Occupational History    Not on file   Tobacco Use    Smoking status: Current Every Day Smoker     Packs/day: 0.50     Years: 25.00     Pack years: 12.50     Types: Cigarettes    Smokeless tobacco: Former User     Quit date: 3/16/2016   Substance and Sexual Activity    Alcohol use:  Yes     Alcohol/week: 2.4 oz     Types: 4 Shots of liquor per week    Drug use: No    Sexual activity: Yes     Partners: Female   Other Topics Concern    Not on file   Social History Narrative    Not on file       Health Maintenance   Topic Date Due    EYE EXAM RETINAL OR DILATED  03/08/1972    Shingrix Vaccine Age 50> (1 of 2) 03/08/2012    Pneumococcal 19-64 Highest Risk (2 of 3 - PCV13) 07/27/2017    MICROALBUMIN Q1  05/08/2019    MEDICARE YEARLY EXAM  06/09/2019    FOOT EXAM Q1  06/21/2019    HEMOGLOBIN A1C Q6M  07/22/2019    LIPID PANEL Q1  01/22/2020    COLONOSCOPY  01/23/2022    DTaP/Tdap/Td series (2 - Td) 09/17/2025    Hepatitis C Screening  Completed    Influenza Age 5 to Adult  Completed     Immunization History   Administered Date(s) Administered    Influenza Vaccine 09/17/2015    Influenza Vaccine (Quad) PF 10/28/2016, 12/22/2017, 10/31/2018    Pneumococcal Polysaccharide (PPSV-23) 07/27/2016    Tdap 09/17/2015       Review of Systems  Negative except as mentioned in HPI      Physical Exam  Vital signs:   Vitals:    03/07/19 0756   BP: 135/79   Pulse: 95   Resp: 18   Temp: 97.6 °F (36.4 °C)   TempSrc: Oral   SpO2: (!) 84%   Weight: 214 lb (97.1 kg)   Height: 5' 9\" (1.753 m)       General: alert, oriented, not in distress  Head: scalp normal, atraumatic  Eyes: pupils are equal and reactive, full and intact EOM's  Lips/Mouth: moist lips and buccal mucosa, non-enlarged tonsils, pink throat  Neck: supple, no JVD, no lymphadenopathy, non-palpable thyroid  Chest/Lungs: clear breath sounds, occasional wheezing, no crackles  Heart: normal rate, regular rhythm, no murmur  Extremities: no focal deformities, mild bipedal edema  Skin: no active skin lesions      Laboratory/Tests:  Component      Latest Ref Rng & Units 1/22/2019 1/22/2019 10/19/2018          11:16 AM 11:16 AM 11:53 AM   Sodium      136 - 145 mmol/L   136   Potassium      3.5 - 5.5 mmol/L   4.0   Chloride      100 - 108 mmol/L   97 (L)   CO2      21 - 32 mmol/L   29   Anion gap      3.0 - 18 mmol/L   10   Glucose      74 - 99 mg/dL   137 (H)   BUN      7.0 - 18 MG/DL   12   Creatinine      0.6 - 1.3 MG/DL   0.92   BUN/Creatinine ratio      12 - 20     13   GFR est AA      >60 ml/min/1.73m2   >60   GFR est non-AA      >60 ml/min/1.73m2   >60   Calcium      8.5 - 10.1 MG/DL   8.6   Cholesterol, total      <200 MG/DL  194    Triglyceride      <150 MG/DL  194 (H)    HDL Cholesterol      40 - 60 MG/DL  46    LDL, calculated      0 - 100 MG/DL  109.2 (H)    VLDL, calculated      MG/DL  38.8    CHOL/HDL Ratio      0 - 5.0    4.2    Hemoglobin A1c, (calculated)      4.2 - 5.6 % 8.6 (H)     Est. average glucose      mg/dL 200       XR CHEST PA LAT 1/22/19     INDICATION: Productive cough for approximately 1 month     COMPARISON: Several prior chest radiograph, most recently 10/31/2018.     FINDINGS: PA and lateral radiographs of the chest demonstrate no focal pneumonic  consolidation, pneumothorax, or pleural effusion. The cardiac size mildly  enlarged. Mediastinal contours are stable. No acute osseous abnormality is  present.     IMPRESSION  IMPRESSION:   1. Mild cardiac enlargement without acute radiographic abnormality. 2. Interval clearance of previously noted faint alveolar opacities. ECHOCARDIOGRAM 6/4/18  Left ventricle: Systolic function was mildly reduced by visual assessment. Ejection fraction was estimated to be 50 %. No obvious wall motion abnormalities identified in the views obtained. Wall   thickness was mildly to moderately  increased. Doppler parameters were consistent with abnormal left ventricular   relaxation (grade 1 diastolic dysfunction). Ventricular septum: There was paradoxical motion. Right ventricle: The ventricle was mildly dilated. Estimated peak pressure   was in the range of 75 mmHg to 80 mmHg. Left atrium: Size was normal.    Right atrium: The atrium was dilated. Mitral valve: There was trivial regurgitation. Aortic valve: There was no stenosis. Tricuspid valve: There was mild to moderate regurgitation. Inferior vena cava, hepatic veins: The inferior vena cava was normal in size   and course. Pericardium: The pericardium was normal in appearance. MRI of the lumbar spine without contrast 3/5/16     Indications: 26-year-old patient with spondylolisthesis and radicular pain.     Comparisons: Lumbar spine radiographs dated 2/24/2016     Technique: T1 weighted,    T2 FSE with fat saturation, FSE inversion recovery  sagittal images are supplemented by T2 weighted with fat saturation and T1  weighted axial images.     Findings:     There is mild anterolisthesis of L4 on L5 present. The vertebral body heights  are maintained throughout the lumbar spine.  There is no evidence of fracture  present. No osseous lesions or abnormal signal intensity is present. Intervertebral disc heights are largely maintained, with the exception of L4-L5  which demonstrates mild to moderate intervertebral disc height loss as well as  desiccation. Small annular fissure formation is noted at this level.       Conus medullaris ends at the L1 vertebral body level.     Correlation of axial and sagittal images reveals the following:     L1-L2: No significant disc pathology. No significant proliferative change. No  central canal or foraminal stenosis.     L2-L3: No significant disc pathology. No significant proliferative change. No  central canal or foraminal stenosis.     L3-L4: There is a mild diffuse disc bulge, slightly eccentric to the left. No  significant proliferative change. There is minimal left neuroforaminal  narrowing without right neural foramen or central canal stenosis     L4-L5: There is a moderate and diffuse disc bulge present. This disc bulges  slightly eccentric to the right. . There is moderately severe facet hypertrophy  present bilaterally. There is moderately severe bilateral neuroforaminal  narrowing, right worse than left, with mild central canal stenosis secondary to  a combination of disc bulge, ligamentum flavum hypertrophy, and epidural  lipomatosis.     L5-S1:  There is a mild and diffuse disc bulge, slightly eccentric to the left. There is mild bilateral facet hypertrophy. There is moderate left and mild  right neuroforaminal narrowing. Mild central canal narrowing is present, most  related to epidural lipomatosis.     The visualized portions of the sacroiliac joints are unremarkable. Incidentally  imaged retroperitoneal structures are unremarkable as well.     IMPRESSION:        1. Minimal anterolisthesis of L4 on L5 secondary to moderately severe L4-L5  facet hypertrophy. 2. Moderate and diffuse L4-L5 disc bulge, slightly eccentric to the left.   Moderately severe bilateral neuroforaminal narrowing is present at this level  along with mild central canal stenosis. 3. Mild and diffuse L5-S1 disc bulge with mild right and moderate left  neuroforaminal narrowing.       Assessment/Plan:      1. Other emphysema (HCC)  - albuterol (PROVENTIL HFA, VENTOLIN HFA, PROAIR HFA) 90 mcg/actuation inhaler; Take 2 Puffs by inhalation every four (4) hours as needed for Wheezing or Shortness of Breath. Dispense: 1 Inhaler; Refill: 3  - fluticasone-salmeterol (ADVAIR) 250-50 mcg/dose diskus inhaler; Take 1 Puff by inhalation every twelve (12) hours. Dispense: 1 Inhaler; Refill: 3  - REFERRAL TO PULMONARY DISEASE    2. COPD with acute exacerbation (HCC)  - predniSONE (DELTASONE) 50 mg tablet; Take 1 Tab by mouth daily. Dispense: 7 Tab; Refill: 0  - guaiFENesin ER (MUCINEX) 600 mg ER tablet; Take 1 Tab by mouth two (2) times a day for 7 days. Dispense: 14 Tab; Refill: 0    3. Type 2 diabetes mellitus with nephropathy (HCC)  - controlled    4. Type 2 diabetes mellitus with diabetic neuropathy, without long-term current use of insulin (HCC)  - controlled    5. Hypercholesteremia  - controlled    6. Chronic midline low back pain without sciatica  - MRI showed lumbar spinal stenosis  - will discuss seeing a spine surgeon and/or pain management on next appointment    7. Essential hypertension  - controlled    8. Other proteinuria  - nephrology appointment        Follow-up Disposition:  Return in about 2 weeks (around 3/21/2019) for ROV (30 minutes). I have discussed the diagnosis with the patient and the intended plan as seen in the above orders. The patient has received an after-visit summary and questions were answered concerning future plans. I have discussed medication side effects and warnings with the patient as well. I have reviewed the plan of care with the patient, accepted their input and they are in agreement with the treatment goals.        Carlee Mena MD  March 7, 2019

## 2019-03-12 ENCOUNTER — TELEPHONE (OUTPATIENT)
Dept: FAMILY MEDICINE CLINIC | Age: 59
End: 2019-03-12

## 2019-03-12 DIAGNOSIS — I10 ESSENTIAL HYPERTENSION: ICD-10-CM

## 2019-03-12 DIAGNOSIS — R06.09 DYSPNEA ON EXERTION: ICD-10-CM

## 2019-03-12 DIAGNOSIS — J43.9 PULMONARY EMPHYSEMA, UNSPECIFIED EMPHYSEMA TYPE (HCC): ICD-10-CM

## 2019-03-12 DIAGNOSIS — I42.9 CARDIOMYOPATHY, UNSPECIFIED TYPE (HCC): Primary | ICD-10-CM

## 2019-03-12 DIAGNOSIS — M79.89 LEG SWELLING: ICD-10-CM

## 2019-03-12 RX ORDER — FUROSEMIDE 40 MG/1
40 TABLET ORAL DAILY
Qty: 90 TAB | Refills: 1 | Status: SHIPPED | OUTPATIENT
Start: 2019-03-12 | End: 2019-10-22 | Stop reason: SDUPTHER

## 2019-03-12 NOTE — TELEPHONE ENCOUNTER
Has patient been taking furosemide? If not, Start furosemide 40mg daily. If he has been taking it daily, advise to increase dose to 1.5 tabs daily for about 3-5 days until swelling improves. Ordered echocardiogram. Follow-up in one week.

## 2019-03-12 NOTE — TELEPHONE ENCOUNTER
Nurse called patient, two patient identifiers used and confirmed by patient. Pt states he is starting to twitch in his arm, bloated and shortness of breath. Pt stated he could not get to the pharmacy before closing because we notified him late in the afternoon. Nurse advised patient that due to sob and arm twitching please go to the ER. Pt verbalized understanding of conversation, ECHO and to keep 03/21/2019 appt. This encounter will be closed.

## 2019-03-12 NOTE — TELEPHONE ENCOUNTER
Pt. Stated his legs are still swollen and he is holding water. Pt. Would like to know what he needs to do. Please advise.

## 2019-03-13 DIAGNOSIS — I42.9 CARDIOMYOPATHY, UNSPECIFIED TYPE (HCC): ICD-10-CM

## 2019-03-14 RX ORDER — CARVEDILOL 12.5 MG/1
TABLET ORAL
Qty: 60 TAB | Refills: 3 | OUTPATIENT
Start: 2019-03-14

## 2019-03-21 ENCOUNTER — OFFICE VISIT (OUTPATIENT)
Dept: FAMILY MEDICINE CLINIC | Age: 59
End: 2019-03-21

## 2019-03-21 VITALS
DIASTOLIC BLOOD PRESSURE: 82 MMHG | SYSTOLIC BLOOD PRESSURE: 131 MMHG | BODY MASS INDEX: 31.4 KG/M2 | HEIGHT: 69 IN | TEMPERATURE: 98 F | OXYGEN SATURATION: 88 % | WEIGHT: 212 LBS | HEART RATE: 92 BPM | RESPIRATION RATE: 20 BRPM

## 2019-03-21 DIAGNOSIS — E11.40 TYPE 2 DIABETES MELLITUS WITH DIABETIC NEUROPATHY, WITH LONG-TERM CURRENT USE OF INSULIN (HCC): Primary | ICD-10-CM

## 2019-03-21 DIAGNOSIS — R80.8 OTHER PROTEINURIA: ICD-10-CM

## 2019-03-21 DIAGNOSIS — M79.89 LEG SWELLING: ICD-10-CM

## 2019-03-21 DIAGNOSIS — I10 ESSENTIAL HYPERTENSION: ICD-10-CM

## 2019-03-21 DIAGNOSIS — I42.9 CARDIOMYOPATHY, UNSPECIFIED TYPE (HCC): ICD-10-CM

## 2019-03-21 DIAGNOSIS — E78.00 HYPERCHOLESTEREMIA: ICD-10-CM

## 2019-03-21 DIAGNOSIS — J43.8 OTHER EMPHYSEMA (HCC): ICD-10-CM

## 2019-03-21 DIAGNOSIS — Z79.4 TYPE 2 DIABETES MELLITUS WITH DIABETIC NEUROPATHY, WITH LONG-TERM CURRENT USE OF INSULIN (HCC): Primary | ICD-10-CM

## 2019-03-21 LAB — HBA1C MFR BLD HPLC: 8.3 %

## 2019-03-21 NOTE — PROGRESS NOTES
History of Present Illness Carlton Nava is a 64 y.o. male who presents today for management of 
 
Chief Complaint Patient presents with  Diabetes  COPD  Hypertension  Cholesterol Problem Patient is here to establish care. Previous PCP: Dr. Claudean Rutter Diabetes Mellitus: He has diabetes mellitus, and  hypertension and hyperlipidemia. Diabetic ROS - medication compliance: compliant all of the time, diabetic diet compliance: compliant all of the time, home glucose monitoring: is performed sporadically. Lab review: orders written for new lab studies as appropriate; see orders. COPD Review: 
The patient is being seen for follow up of COPD. He completed steroid treatment 2 weeks ago for COPD exacerbation. He is feeling better. Oxygen: He currently is not on home oxygen therapy. Symptoms: chronic dyspnea: severity = mild: course of sx: stable 
becomes dyspneic after 0 blocks. Patient does smoke cigarettes. Cardiovascular Review: 
The patient has hypertension and hyperlipidemia. Diet and Lifestyle: generally follows a low fat low cholesterol diet Home BP Monitoring: is not measured at home. Pertinent ROS: taking medications as instructed, no medication side effects noted, no TIA's, no chest pain on exertion, notes stable dyspnea on exertion, no change, noting swelling of ankles. Past Medical History Past Medical History:  
Diagnosis Date  Back pain  Chronic obstructive pulmonary disease ([address]  Diabetes ([facility] [address]  Hypercholesterolemia  Hypertension  Sleep apnea Does not use CPAP Surgical History Past Surgical History:  
Procedure Laterality Date  COLONOSCOPY N/A 1/23/2017 COLONOSCOPY performed by Huber Lowery MD at [facility]  2003  
 umbilacal   
 HX SHOULDER ARTHROSCOPY  2004  
 bilateral  
  
Current Medications Current Outpatient Medications Medication Sig  
  dulaglutide (TRULICITY) 8.97 QM/3.8 mL sub-q pen 0.5 mL by SubCUTAneous route every seven (7) days.  furosemide (LASIX) 40 mg tablet Take 1 Tab by mouth daily.  albuterol (PROVENTIL HFA, VENTOLIN HFA, PROAIR HFA) 90 mcg/actuation inhaler Take 2 Puffs by inhalation every four (4) hours as needed for Wheezing or Shortness of Breath.  cetirizine (ZYRTEC) 10 mg tablet TAKE 1 TABLET BY MOUTH ONCE DAILY  traMADol (ULTRAM) 50 mg tablet Take 1 Tab by mouth every eight (8) hours as needed for Pain. Max Daily Amount: 150 mg.  
 aspirin 81 mg chewable tablet Take 1 Tab by mouth daily.  pantoprazole (PROTONIX) 40 mg tablet Take 1 Tab by mouth daily.  carvedilol (COREG) 12.5 mg tablet Take 1 Tab by mouth two (2) times daily (with meals).  losartan (COZAAR) 50 mg tablet Take 1 Tab by mouth daily.  gabapentin (NEURONTIN) 300 mg capsule Take 1 Cap by mouth four (4) times daily.  clotrimazole (LOTRIMIN) 1 % topical cream Apply  to affected area two (2) times a day.  hydroCHLOROthiazide (HYDRODIURIL) 25 mg tablet Take 1 Tab by mouth daily.  pravastatin (PRAVACHOL) 40 mg tablet Take 1.5 Tabs by mouth nightly. Take one-half tablet by mouth at bedtime  carboxymethylcellulose sodium (REFRESH TEARS) 0.5 % drop ophthalmic solution Administer 1 Drop to both eyes two (2) times a day.  clobetasol (TEMOVATE) 0.05 % ointment Apply  to affected area two (2) times a day.  cholecalciferol (VITAMIN D3) 1,000 unit tablet Take 1,000 Units by mouth daily.  omega-3 fatty acids-vitamin e (FISH OIL) 1,000 mg cap Take 1 Cap by mouth two (2) times a day.  metFORMIN (GLUCOPHAGE) 1,000 mg tablet Take 1,000 mg by mouth two (2) times daily (with meals).  glipiZIDE (GLUCOTROL) 10 mg tablet Take 10 mg by mouth three (3) times daily.  amLODIPine (NORVASC) 10 mg tablet Take 10 mg by mouth daily. No current facility-administered medications for this visit. Allergies/Drug Reactions Allergies Allergen Reactions  Lisinopril Swelling Family History Family History Problem Relation Age of Onset  Cancer Mother  Hypertension Mother  Heart Disease Mother  Diabetes Mother  No Known Problems Father Social History Social History Socioeconomic History  Marital status:  Spouse name: Not on file  Number of children: Not on file  Years of education: Not on file  Highest education level: Not on file Occupational History  Not on file Social Needs  Financial resource strain: Not on file  Food insecurity:  
  Worry: Not on file Inability: Not on file  Transportation needs:  
  Medical: Not on file Non-medical: Not on file Tobacco Use  Smoking status: Current Every Day Smoker Packs/day: 0.50 Years: 25.00 Pack years: 12.50 Types: Cigarettes  Smokeless tobacco: Former User Quit date: 3/16/2016 Substance and Sexual Activity  Alcohol use: Yes Alcohol/week: 2.4 oz Types: 4 Shots of liquor per week  Drug use: No  
 Sexual activity: Yes  
  Partners: Female Lifestyle  Physical activity:  
  Days per week: Not on file Minutes per session: Not on file  Stress: Not on file Relationships  Social connections:  
  Talks on phone: Not on file Gets together: Not on file Attends Hinduism service: Not on file Active member of club or organization: Not on file Attends meetings of clubs or organizations: Not on file Relationship status: Not on file  Intimate partner violence:  
  Fear of current or ex partner: Not on file Emotionally abused: Not on file Physically abused: Not on file Forced sexual activity: Not on file Other Topics Concern  Not on file Social History Narrative  Not on file Health Maintenance Topic Date Due  
 EYE EXAM RETINAL OR DILATED  03/08/1972  Shingrix Vaccine Age 50> (1 of 2) 03/08/2012  Pneumococcal 0-64 years (2 of 3 - PCV13) 07/27/2017  MICROALBUMIN Q1  05/08/2019  MEDICARE YEARLY EXAM  06/09/2019  
 FOOT EXAM Q1  06/21/2019  
 HEMOGLOBIN A1C Q6M  07/22/2019  LIPID PANEL Q1  01/22/2020  COLONOSCOPY  01/23/2022  DTaP/Tdap/Td series (2 - Td) 09/17/2025  Hepatitis C Screening  Completed  Influenza Age 5 to Adult  Completed Immunization History Administered Date(s) Administered  Influenza Vaccine 09/17/2015  Influenza Vaccine (Quad) PF 10/28/2016, 12/22/2017, 10/31/2018  Pneumococcal Polysaccharide (PPSV-23) 07/27/2016  Tdap 09/17/2015 Review of Systems Negative except as mentioned in HPI Physical Exam 
Vital signs:  
Vitals:  
 03/21/19 1039 BP: 131/82 Pulse: 92 Resp: 20 Temp: 98 °F (36.7 °C) TempSrc: Oral  
SpO2: (!) 88% Weight: 212 lb (96.2 kg) Height: 5' 9\" (1.753 m) General: alert, oriented, not in distress Head: scalp normal, atraumatic Eyes: pupils are equal and reactive, full and intact EOM's 
Ears: patent ear canal, intact tympanic membrane Nose: normal turbinates, no congestion or discharge 
Lips/Mouth: moist lips and buccal mucosa, non-enlarged tonsils, pink throat Neck: supple, no JVD, no lymphadenopathy, non-palpable thyroid Chest/Lungs: clear breath sounds, no wheezing or crackles Heart: normal rate, regular rhythm, no murmur Abdomen: soft, non-distended, non-tender, normal bowel sounds, no organomegaly, no masses Extremities: no focal deformities, pitting bipedal edema Skin: no active skin lesions Laboratory/Tests: 
 
Component Latest Ref Rng & Units 3/21/2019 1/22/2019 10/19/2018 10:44 AM 11:16 AM 11:53 AM  
Sodium 136 - 145 mmol/L   136 Potassium 3.5 - 5.5 mmol/L   4.0 Chloride 100 - 108 mmol/L   97 (L)  
CO2 
    21 - 32 mmol/L   29 Anion gap 3.0 - 18 mmol/L   10 Glucose 74 - 99 mg/dL   137 (H) BUN 
    7.0 - 18 MG/DL   12 Creatinine 0.6 - 1.3 MG/DL   0.92  
BUN/Creatinine ratio 12 - 20     13 GFR est AA 
    >60 ml/min/1.73m2   >60  
GFR est non-AA 
    >60 ml/min/1.73m2   >60 Calcium 8.5 - 10.1 MG/DL   8.6 Cholesterol, total 
    <200 MG/DL  194 Triglyceride 
    <150 MG/DL  194 (H) HDL Cholesterol 40 - 60 MG/DL  46 LDL, calculated 0 - 100 MG/DL  109.2 (H) VLDL, calculated MG/DL  38.8 CHOL/HDL Ratio 0 - 5.0    4.2 Hemoglobin A1c (POC) 
    % 8.3 Component Latest Ref Rng & Units 9/11/2018 9/11/2018  
 
      2:53 PM  2:53 PM  
WBC 
    4.6 - 13.2 K/uL  8.5  
RBC 
    4.70 - 5.50 M/uL  5.15  
HGB 13.0 - 16.0 g/dL  14.7 HCT 
    36.0 - 48.0 %  46.9 MCV 
    74.0 - 97.0 FL  91.1 MCH 
    24.0 - 34.0 PG  28.5 MCHC 31.0 - 37.0 g/dL  31.3  
RDW 
    11.6 - 14.5 %  16.0 (H) PLATELET 
    056 - 115 K/uL  292 MPV 
    9.2 - 11.8 FL  11.7 NEUTROPHILS 
    40 - 73 %  45 LYMPHOCYTES 
    21 - 52 %  44 MONOCYTES 
    3 - 10 %  8 EOSINOPHILS 
    0 - 5 %  2  
BASOPHILS 
    0 - 2 %  1  
ABS. NEUTROPHILS 
    1.8 - 8.0 K/UL  3.8  
ABS. LYMPHOCYTES 
    0.9 - 3.6 K/UL  3.7 (H)  
ABS. MONOCYTES 
    0.05 - 1.2 K/UL  0.7 ABS. EOSINOPHILS 
    0.0 - 0.4 K/UL  0.2  
ABS. BASOPHILS 
    0.0 - 0.1 K/UL  0.0  
DF AUTOMATED Sodium 136 - 145 mmol/L 141 Potassium 3.5 - 5.5 mmol/L 3.9 Chloride 100 - 108 mmol/L 101 CO2 
    21 - 32 mmol/L 31 Anion gap 3.0 - 18 mmol/L 9 Glucose 74 - 99 mg/dL 147 (H) BUN 
    7.0 - 18 MG/DL 15 Creatinine 
    0.6 - 1.3 MG/DL 1.50 (H) BUN/Creatinine ratio 12 - 20   10 (L) GFR est AA 
    >60 ml/min/1.73m2 59 (L) GFR est non-AA 
    >60 ml/min/1.73m2 48 (L) Calcium 8.5 - 10.1 MG/DL 8.8 Bilirubin, total 
    0.2 - 1.0 MG/DL 0.3 ALT (SGPT) 16 - 61 U/L 15 (L) AST 
    15 - 37 U/L 15 Alk. phosphatase 45 - 117 U/L 79 Protein, total 
 6.4 - 8.2 g/dL 7.5 Albumin 3.4 - 5.0 g/dL 3.3 (L) Globulin 2.0 - 4.0 g/dL 4.2 (H) A-G Ratio 
    0.8 - 1.7   0.8 Component Latest Ref Rng & Units 5/31/2018  
 
      8:50 AM  
Period of collection 
    hr 24 Volume 
    mL 1,580 Protein,urine 24 hr 
    <149.1 mg/24hr 3,192 (H) ECHOCARDIOGRAM 6/4/18 SUMMARY: 
Left ventricle: Systolic function was mildly reduced by visual assessment. Ejection fraction was estimated to be 50 %. No obvious wall motion abnormalities identified in the views obtained. Wall  
thickness was mildly to moderately 
increased. Doppler parameters were consistent with abnormal left ventricular  
relaxation (grade 1 diastolic dysfunction). Ventricular septum: There was paradoxical motion. Right ventricle: The ventricle was mildly dilated. Estimated peak pressure  
was in the range of 75 mmHg to 80 mmHg. Left atrium: Size was normal. 
 
Right atrium: The atrium was dilated. Mitral valve: There was trivial regurgitation. Aortic valve: There was no stenosis. Tricuspid valve: There was mild to moderate regurgitation. Inferior vena cava, hepatic veins: The inferior vena cava was normal in size  
and course. Pericardium: The pericardium was normal in appearance. INDICATIONS: Dyspnea/shortness of breath, Hypertension. PROCEDURE: This was a routine study. The study included complete 2D imaging,  
complete spectral Doppler, and color Doppler. Systolic blood pressure was 154 mmHg, at the start of the study. Diastolic blood pressure was 88 mmHg, at the 
start of the study. Images were obtained from the parasternal, apical,  
subcostal, and suprasternal notch acoustic 
windows. Image quality was good. LEFT VENTRICLE: Size was normal. Systolic function was mildly reduced by  
visual assessment. Ejection fraction was 
estimated to be 50 %.  No obvious wall motion abnormalities identified in the  
 views obtained. Wall thickness was mildly 
to moderately increased. DOPPLER: Doppler parameters were consistent with  
abnormal left ventricular relaxation (grade 1 
diastolic dysfunction). VENTRICULAR SEPTUM: There was paradoxical motion. RIGHT VENTRICLE: The ventricle was mildly dilated. Systolic function was low  
normal. DOPPLER: Estimated peak pressure 
was in the range of 75 mmHg to 80 mmHg. LEFT ATRIUM: Size was normal. 
 
RIGHT ATRIUM: The atrium was dilated. MITRAL VALVE: There was normal leaflet separation. DOPPLER: The transmitral  
velocity was within the normal range. There 
was no evidence for stenosis. There was trivial regurgitation. AORTIC VALVE: The valve was trileaflet. Leaflets exhibited normal thickness  
and normal cuspal separation. DOPPLER: 
Transaortic velocity was within the normal range. There was no stenosis. There was no regurgitation. TRICUSPID VALVE: There was normal leaflet separation. DOPPLER: The  
transtricuspid velocity was within the normal range. There was no evidence for tricuspid stenosis. There was mild to moderate  
regurgitation. PULMONIC VALVE: Not well visualized, but normal Doppler findings. DOPPLER:  
There was no stenosis. There was no 
significant regurgitation. AORTA: The root exhibited normal size. SYSTEMIC VEINS: IVC: The inferior vena cava was normal in size and course. Respirophasic changes were normal. 
 
PERICARDIUM: There was no pericardial effusion. The pericardium was normal in appearance. Assessment/Plan: 1. Type 2 diabetes mellitus with diabetic neuropathy, with long-term current use of insulin ([address] 
- needs better control - AMB POC HEMOGLOBIN A1C 8.3% 
- start dulaglutide (TRULICITY) 7.98 HB/8.9 mL sub-q pen; 0.5 mL by SubCUTAneous route every seven (7) days. Dispense: 4 Pen; Refill: 0 
- continue metformin and glipizide 2. Cardiomyopathy, unspecified type ([address] 
- repeat echocardiogram 
 
3. Leg swelling - continue Lasix 4. Essential hypertension 
- controlled 5. Other emphysema ([address] 
- REFERRAL TO PULMONARY DISEASE 6. Hypercholesteremia - , goal is less than 90 
- continue pravastatin 60mg QHS 7. Other proteinuria 
- nephrology follow-up I have discussed the diagnosis with the patient and the intended plan as seen in the above orders. The patient has received an after-visit summary and questions were answered concerning future plans. I have discussed medication side effects and warnings with the patient as well. I have reviewed the plan of care with the patient, accepted their input and they are in agreement with the treatment goals. Vince Scheuermann, MD 
March 21, 2019

## 2019-03-21 NOTE — PROGRESS NOTES
1. Have you been to the ER, urgent care clinic since your last visit? Hospitalized since your last visit? No 
 
2. Have you seen or consulted any other health care providers outside of the [address] [place] [address] since your last visit? Include any pap smears or colon screening.  No

## 2019-04-08 ENCOUNTER — CLINICAL SUPPORT (OUTPATIENT)
Dept: PULMONOLOGY | Facility: CLINIC | Age: 59
End: 2019-04-08

## 2019-04-08 VITALS
SYSTOLIC BLOOD PRESSURE: 104 MMHG | DIASTOLIC BLOOD PRESSURE: 80 MMHG | HEIGHT: 67 IN | OXYGEN SATURATION: 92 % | WEIGHT: 210 LBS | HEART RATE: 84 BPM | BODY MASS INDEX: 32.96 KG/M2 | TEMPERATURE: 96.8 F | RESPIRATION RATE: 18 BRPM

## 2019-04-08 DIAGNOSIS — J44.9 CHRONIC OBSTRUCTIVE PULMONARY DISEASE, UNSPECIFIED COPD TYPE (HCC): Primary | ICD-10-CM

## 2019-04-09 ENCOUNTER — OFFICE VISIT (OUTPATIENT)
Dept: PULMONOLOGY | Facility: CLINIC | Age: 59
End: 2019-04-09

## 2019-04-09 VITALS
DIASTOLIC BLOOD PRESSURE: 88 MMHG | HEIGHT: 67 IN | WEIGHT: 210 LBS | SYSTOLIC BLOOD PRESSURE: 132 MMHG | HEART RATE: 88 BPM | BODY MASS INDEX: 32.96 KG/M2 | OXYGEN SATURATION: 82 %

## 2019-04-09 DIAGNOSIS — R94.2 RESTRICTIVE PATTERN PRESENT ON PULMONARY FUNCTION TESTING: ICD-10-CM

## 2019-04-09 DIAGNOSIS — G47.33 OSA (OBSTRUCTIVE SLEEP APNEA): ICD-10-CM

## 2019-04-09 DIAGNOSIS — I27.20 PULMONARY HYPERTENSION (HCC): ICD-10-CM

## 2019-04-09 DIAGNOSIS — R06.09 DOE (DYSPNEA ON EXERTION): Primary | ICD-10-CM

## 2019-04-09 NOTE — PROGRESS NOTES
Chief Complaint   Patient presents with    Shortness of Breath     patient states he does not have COPD or emphysema \"to his knowledge. \" He states he has been very short of breath for several months. he notes that he has seasonal allergies but denies a history of asthma. 1. Have you been to the ER, urgent care clinic since your last visit? Hospitalized since your last visit? No    2. Have you seen or consulted any other health care providers outside of the [address] [place] [address] since your last visit? Include any pap smears or colon screening. No     He states he did have PNA when he was in the [employer].

## 2019-04-09 NOTE — PROGRESS NOTES
[facility]  Pulmonary, Critical Care, and Sleep Medicine       Pulmonary Office Progress Notes        Subjective: The patient presents for evaluation of shortness of breath    History  26-year-old male that started smoking somewhere around age 34. Over the past 2 years he has decreased his smoking to about 1/2 pack/day. Over most of that time he smoked about 1 pack/day. He was in the [employer] but does not know of any asbestos exposures. He worked for [employer] and was exposed to [employer] for 4-5 years. He is been treated with Advair and Symbicort. He found Symbicort more effective. He is also been given a therapeutic trial of albuterol. He has been unreliable in medication use. He has obstructive sleep apnea but has not used the device because of nasal congestion. Echocardiogram shows estimated peak pulmonary artery pressures at 75-80 mmHg. Pulmonary function tests have suggested restrictive physiology, but there does not appear to be interstitial disease on any chest x-rays. He is not morbidly obese. Shortness of breath occurs at less than 1 flight of residential stairs. He needs to lean on a cart when he goes grocery shopping. He has an occasional cough but denies purulent sputum or hemoptysis. Typically he describes it as a smoker's cough. He sometimes will hear wheezing. He is not been admitted to the hospital for respiratory failure. While he believes that his shortness of breath has worsened over the past 6 months, in retrospect it has been slowly worsening over the past few years. Review of systems  No neurologic deficit. Nasal congestion with CPAP use. He is not on a nasal steroid. No palpable adenopathy. Chest pain is a vague substernal pain with extreme exertion. No syncope. No dysphasia or odynophagia. No unusual bleeding. Lower extremity edema is pitting and mild.   The review of systems was completed in its entirety and is otherwise normal.    Past medical history  Diabetes mellitus  Hypercholesterolemia  Hypertension  Mild obesity  Tobacco abuse  Obstructive sleep apnea  Chronic back pain    Past surgical history  Umbilical hernia repair  Bilateral shoulder arthroscopic surgeries  Colonoscopy    Allergies   Allergen Reactions    Lisinopril Swelling     Current Outpatient Medications on File Prior to Visit   Medication Sig Dispense Refill    furosemide (LASIX) 40 mg tablet Take 1 Tab by mouth daily. 90 Tab 1    albuterol (PROVENTIL HFA, VENTOLIN HFA, PROAIR HFA) 90 mcg/actuation inhaler Take 2 Puffs by inhalation every four (4) hours as needed for Wheezing or Shortness of Breath. 1 Inhaler 3    aspirin 81 mg chewable tablet Take 1 Tab by mouth daily. 90 Tab 1    carvedilol (COREG) 12.5 mg tablet Take 1 Tab by mouth two (2) times daily (with meals). 60 Tab 3    losartan (COZAAR) 50 mg tablet Take 1 Tab by mouth daily. 30 Tab 3    gabapentin (NEURONTIN) 300 mg capsule Take 1 Cap by mouth four (4) times daily. 120 Cap 3    hydroCHLOROthiazide (HYDRODIURIL) 25 mg tablet Take 1 Tab by mouth daily. 90 Tab 0    pravastatin (PRAVACHOL) 40 mg tablet Take 1.5 Tabs by mouth nightly. Take one-half tablet by mouth at bedtime 90 Tab 1    carboxymethylcellulose sodium (REFRESH TEARS) 0.5 % drop ophthalmic solution Administer 1 Drop to both eyes two (2) times a day. 30 mL 3    cholecalciferol (VITAMIN D3) 1,000 unit tablet Take 1,000 Units by mouth daily.  metFORMIN (GLUCOPHAGE) 1,000 mg tablet Take 1,000 mg by mouth two (2) times daily (with meals).  glipiZIDE (GLUCOTROL) 10 mg tablet Take 10 mg by mouth three (3) times daily.  amLODIPine (NORVASC) 10 mg tablet Take 10 mg by mouth daily.  exenatide microspheres (BYDUREON BCISE) 2 mg/0.85 mL atIn 2 mg by SubCUTAneous route every seven (7) days.  4 Syringe 5    cetirizine (ZYRTEC) 10 mg tablet TAKE 1 TABLET BY MOUTH ONCE DAILY 30 Tab 3    traMADol (ULTRAM) 50 mg tablet Take 1 Tab by mouth every eight (8) hours as needed for Pain. Max Daily Amount: 150 mg. 40 Tab 0    pantoprazole (PROTONIX) 40 mg tablet Take 1 Tab by mouth daily. 30 Tab 3    clotrimazole (LOTRIMIN) 1 % topical cream Apply  to affected area two (2) times a day. 35 g 1    clobetasol (TEMOVATE) 0.05 % ointment Apply  to affected area two (2) times a day. 15 g 2    omega-3 fatty acids-vitamin e (FISH OIL) 1,000 mg cap Take 1 Cap by mouth two (2) times a day. 60 Cap 3     No current facility-administered medications on file prior to visit. Social history  Smoking history as outlined above    Family history  Hypertension, diabetes, heart disease and cancer. Objective:     He is alert, oriented and in no respiratory distress at rest.  Affect is normal.  Blood pressure 132/88, pulse 88, height 5' 7\" (1.702 m), weight 95.3 kg (210 lb), SpO2 (!) 82 %. Sclera are anicteric. Extraocular muscles are intact. Gaze is conjugate. Oral mucosa is moist.  Neck is supple and there is trachea is midline. No lymphadenopathy  Lungs show coarse breath sounds anteriorly bilaterally and at both bases. There are no wheezes or rales. Heart has a regular rate and rhythm. There is a right-sided S4. Abdomen is soft and nondistended. There is no right upper or left upper quadrant tenderness  No edema, cyanosis or clubbing  No changes of the hands or wrists suggesting an inflammatory arthritis. No facial rash. Chest x-ray does not clearly suggest interstitial lung disease. If anything there is evidence of tram track changes at both bases. Echocardiogram 6/4/18 shows slightly reduced left ventricular systolic function with an ejection fraction of 50%. The right ventricle and right atrium were both mildly dilated. Estimated peak pulmonary artery pressure was 75-80 mmHg.     Assessment  Dyspnea on exertion  Pulmonary hypertension  Tobacco abuse  Restrictive pulmonary function tests  Risk factors for obstructive lung disease  Obstructive sleep apnea with noncompliance      Plan:  Therapeutic trial of Spiriva  Continue Symbicort and albuterol  Chest CT with contrast to evaluate for chronic PEs and interstitial lung disease  Encourage smoking cessation  Encourage CPAP use    4-week return to clinic

## 2019-04-13 ENCOUNTER — HOSPITAL ENCOUNTER (OUTPATIENT)
Dept: NON INVASIVE DIAGNOSTICS | Age: 59
Discharge: HOME | End: 2019-04-13
Attending: INTERNAL MEDICINE
Payer: MEDICARE

## 2019-04-13 VITALS
SYSTOLIC BLOOD PRESSURE: 138 MMHG | WEIGHT: 210 LBS | HEIGHT: 67 IN | DIASTOLIC BLOOD PRESSURE: 79 MMHG | BODY MASS INDEX: 32.96 KG/M2

## 2019-04-13 DIAGNOSIS — R06.09 DYSPNEA ON EXERTION: ICD-10-CM

## 2019-04-13 DIAGNOSIS — J43.9 PULMONARY EMPHYSEMA, UNSPECIFIED EMPHYSEMA TYPE (HCC): ICD-10-CM

## 2019-04-13 DIAGNOSIS — M79.89 LEG SWELLING: ICD-10-CM

## 2019-04-13 DIAGNOSIS — I10 ESSENTIAL HYPERTENSION: ICD-10-CM

## 2019-04-13 DIAGNOSIS — I42.9 CARDIOMYOPATHY, UNSPECIFIED TYPE (HCC): ICD-10-CM

## 2019-04-13 PROCEDURE — 93306 TTE W/DOPPLER COMPLETE: CPT

## 2019-04-14 LAB
ECHO AO ROOT DIAM: 2.84 CM
ECHO AV PEAK GRADIENT: 0 MMHG
ECHO AV PEAK VELOCITY: 0 CM/S
ECHO EST RA PRESSURE: 10 MMHG
ECHO LA VOL 2C: 68.61 ML (ref 18–58)
ECHO LA VOL 4C: 63.19 ML (ref 18–58)
ECHO LA VOLUME INDEX A2C: 33.23 ML/M2
ECHO LA VOLUME INDEX A4C: 30.61 ML/M2
ECHO LV EDV A2C: 147.9 ML
ECHO LV EDV A4C: 121.3 ML
ECHO LV EDV BP: 138.4 ML (ref 67–155)
ECHO LV EDV INDEX A4C: 58.8 ML/M2
ECHO LV EDV INDEX BP: 67 ML/M2
ECHO LV EDV NDEX A2C: 71.6 ML/M2
ECHO LV EJECTION FRACTION A2C: 61 %
ECHO LV EJECTION FRACTION A4C: 60 %
ECHO LV EJECTION FRACTION BIPLANE: 59.7 % (ref 55–100)
ECHO LV ESV A2C: 58.3 ML
ECHO LV ESV A4C: 48.4 ML
ECHO LV ESV BP: 55.8 ML (ref 22–58)
ECHO LV ESV INDEX A2C: 28.2 ML/M2
ECHO LV ESV INDEX A4C: 23.4 ML/M2
ECHO LV ESV INDEX BP: 27 ML/M2
ECHO LV INTERNAL DIMENSION DIASTOLIC: 5.67 CM (ref 4.2–5.9)
ECHO LV INTERNAL DIMENSION SYSTOLIC: 4.83 CM
ECHO LV IVSD: 0.99 CM (ref 0.6–1)
ECHO LV MASS 2D: 286.8 G (ref 88–224)
ECHO LV MASS INDEX 2D: 138.9 G/M2 (ref 49–115)
ECHO LV POSTERIOR WALL DIASTOLIC: 1.12 CM (ref 0.6–1)
ECHO LVOT DIAM: 2.08 CM
ECHO LVOT PEAK GRADIENT: 5.2 MMHG
ECHO LVOT PEAK VELOCITY: 113.89 CM/S
ECHO MV A VELOCITY: 71.21 CM/S
ECHO MV AREA PHT: 8.9 CM2
ECHO MV E DECELERATION TIME (DT): 85 MS
ECHO MV E VELOCITY: 48.26 CM/S
ECHO MV E/A RATIO: 0.68
ECHO MV PRESSURE HALF TIME (PHT): 24.6 MS
ECHO PULMONARY ARTERY SYSTOLIC PRESSURE (PASP): 65 MMHG
ECHO RIGHT VENTRICULAR SYSTOLIC PRESSURE (RVSP): 10 MMHG
ECHO TV REGURGITANT MAX VELOCITY: 0 CM/S
ECHO TV REGURGITANT PEAK GRADIENT: 0 MMHG

## 2019-04-17 ENCOUNTER — HOSPITAL ENCOUNTER (OUTPATIENT)
Dept: CT IMAGING | Age: 59
Discharge: HOME | End: 2019-04-17
Attending: INTERNAL MEDICINE
Payer: MEDICARE

## 2019-04-17 DIAGNOSIS — I27.20 PULMONARY HYPERTENSION (HCC): ICD-10-CM

## 2019-04-17 DIAGNOSIS — R94.2 RESTRICTIVE PATTERN PRESENT ON PULMONARY FUNCTION TESTING: ICD-10-CM

## 2019-04-17 DIAGNOSIS — R06.09 DOE (DYSPNEA ON EXERTION): ICD-10-CM

## 2019-04-17 PROCEDURE — 71250 CT THORAX DX C-: CPT

## 2019-04-23 ENCOUNTER — OFFICE VISIT (OUTPATIENT)
Dept: FAMILY MEDICINE CLINIC | Age: 59
End: 2019-04-23

## 2019-04-23 VITALS
DIASTOLIC BLOOD PRESSURE: 79 MMHG | BODY MASS INDEX: 32.83 KG/M2 | WEIGHT: 209.2 LBS | OXYGEN SATURATION: 90 % | RESPIRATION RATE: 20 BRPM | TEMPERATURE: 96.8 F | HEART RATE: 94 BPM | HEIGHT: 67 IN | SYSTOLIC BLOOD PRESSURE: 105 MMHG

## 2019-04-23 DIAGNOSIS — I42.9 CARDIOMYOPATHY, UNSPECIFIED TYPE (HCC): ICD-10-CM

## 2019-04-23 DIAGNOSIS — M54.41 MIDLINE LOW BACK PAIN WITH RIGHT-SIDED SCIATICA, UNSPECIFIED CHRONICITY: ICD-10-CM

## 2019-04-23 DIAGNOSIS — E78.00 HYPERCHOLESTEREMIA: ICD-10-CM

## 2019-04-23 DIAGNOSIS — I10 ESSENTIAL HYPERTENSION: ICD-10-CM

## 2019-04-23 DIAGNOSIS — J43.9 PULMONARY EMPHYSEMA, UNSPECIFIED EMPHYSEMA TYPE (HCC): ICD-10-CM

## 2019-04-23 DIAGNOSIS — Z12.11 SCREENING FOR MALIGNANT NEOPLASM OF COLON: ICD-10-CM

## 2019-04-23 DIAGNOSIS — E11.40 TYPE 2 DIABETES MELLITUS WITH DIABETIC NEUROPATHY, WITHOUT LONG-TERM CURRENT USE OF INSULIN (HCC): Primary | ICD-10-CM

## 2019-04-23 DIAGNOSIS — E11.21 TYPE 2 DIABETES MELLITUS WITH NEPHROPATHY (HCC): ICD-10-CM

## 2019-04-23 RX ORDER — PROPRANOLOL HYDROCHLORIDE 120 MG/1
120 CAPSULE, EXTENDED RELEASE ORAL DAILY
COMMUNITY
End: 2019-04-23 | Stop reason: ALTCHOICE

## 2019-04-23 RX ORDER — FLUTICASONE PROPIONATE AND SALMETEROL 50; 250 UG/1; UG/1
POWDER RESPIRATORY (INHALATION)
COMMUNITY
Start: 2019-03-07 | End: 2019-11-15 | Stop reason: ALTCHOICE

## 2019-04-23 RX ORDER — CARVEDILOL 6.25 MG/1
6.25 TABLET ORAL 2 TIMES DAILY WITH MEALS
Qty: 180 TAB | Refills: 1 | Status: SHIPPED | OUTPATIENT
Start: 2019-04-23 | End: 2019-10-22 | Stop reason: SDUPTHER

## 2019-04-23 RX ORDER — METHOCARBAMOL 500 MG/1
500 TABLET, FILM COATED ORAL
Qty: 90 TAB | Refills: 1 | Status: SHIPPED | OUTPATIENT
Start: 2019-04-23 | End: 2020-01-20

## 2019-04-23 NOTE — PROGRESS NOTES
1. Have you been to the ER, urgent care clinic since your last visit? Hospitalized since your last visit? No    2. Have you seen or consulted any other health care providers outside of the [address] [place] [address] since your last visit? Include any pap smears or colon screening.  No

## 2019-04-23 NOTE — PROGRESS NOTES
History of Present Illness  Ebonie Sandoval is a 64 y.o. male who presents today for management of    Chief Complaint   Patient presents with    Results    Diabetes    Hypertension    Cholesterol Problem    COPD     Patient complains of chronic low back pain. He is currently on physical therapy twice a week. Diabetes Mellitus:  He has diabetes mellitus, and  hypertension and hyperlipidemia. Diabetic ROS - medication compliance: compliant all of the time, diabetic diet compliance: compliant most of the time, home glucose monitoring: is not performed. Lab review: labs reviewed, I note that glycosylated hemoglobin mildly abnormal but acceptable. Bydureon was not covered by insurance. Cardiovascular Review:  The patient has hypertension, hyperlipidemia and cardiomyopathy. Diet and Lifestyle: generally follows a low fat low cholesterol diet  Home BP Monitoring: is not measured at home. Pertinent ROS: taking medications as instructed. Patient has not been taking carvedilol, has been on propranolol prescribed by the [place], no medication side effects noted, no TIA's, no chest pain on exertion, notes stable dyspnea on exertion, no change, noting swelling of ankles.        Problem List  Patient Active Problem List    Diagnosis Date Noted    Cardiomyopathy [facility]) 04/23/2019    Other proteinuria 03/07/2019    Type 2 diabetes mellitus with diabetic neuropathy ([facility] 02/09/2018    Type 2 diabetes mellitus with nephropathy ([facility] 12/22/2017    Clubbing of nail 03/23/2016    Chronic obstructive pulmonary disease ([address] 03/23/2016    JAIMEE (obstructive sleep apnea) 09/17/2015    Midline low back pain without sciatica 09/03/2015    Essential hypertension 09/03/2015    Hypercholesteremia 09/03/2015    Hip pain 09/03/2015       Past Medical History  Past Medical History:   Diagnosis Date    Back pain     Chronic obstructive pulmonary disease ([address]     Diabetes ([facility]     Hypercholesterolemia     Hypertension     Sleep apnea     Does not use CPAP        Surgical History  Past Surgical History:   Procedure Laterality Date    COLONOSCOPY N/A 1/23/2017    COLONOSCOPY performed by Krishan Suárez MD at [address] [place] [address] [address]  2003    umbilacal     HX SHOULDER ARTHROSCOPY  2004    bilateral        Current Medications  Current Outpatient Medications   Medication Sig    carvedilol (COREG) 6.25 mg tablet Take 1 Tab by mouth two (2) times daily (with meals).  multivit with min-folic acid 608 mcg TbER Take 1 Cap by mouth daily.  methocarbamol (ROBAXIN) 500 mg tablet Take 1 Tab by mouth every six (6) hours as needed (muscle spasm).  tiotropium (SPIRIVA WITH HANDIHALER) 18 mcg inhalation capsule Take 1 Cap by inhalation daily.  furosemide (LASIX) 40 mg tablet Take 1 Tab by mouth daily.  albuterol (PROVENTIL HFA, VENTOLIN HFA, PROAIR HFA) 90 mcg/actuation inhaler Take 2 Puffs by inhalation every four (4) hours as needed for Wheezing or Shortness of Breath.  cetirizine (ZYRTEC) 10 mg tablet TAKE 1 TABLET BY MOUTH ONCE DAILY    aspirin 81 mg chewable tablet Take 1 Tab by mouth daily.  losartan (COZAAR) 50 mg tablet Take 1 Tab by mouth daily.  gabapentin (NEURONTIN) 300 mg capsule Take 1 Cap by mouth four (4) times daily. (Patient taking differently: Take 300 mg by mouth two (2) times a day.)    hydroCHLOROthiazide (HYDRODIURIL) 25 mg tablet Take 1 Tab by mouth daily.  pravastatin (PRAVACHOL) 40 mg tablet Take 1.5 Tabs by mouth nightly. Take one-half tablet by mouth at bedtime    carboxymethylcellulose sodium (REFRESH TEARS) 0.5 % drop ophthalmic solution Administer 1 Drop to both eyes two (2) times a day.  clobetasol (TEMOVATE) 0.05 % ointment Apply  to affected area two (2) times a day.  cholecalciferol (VITAMIN D3) 1,000 unit tablet Take 1,000 Units by mouth daily.  omega-3 fatty acids-vitamin e (FISH OIL) 1,000 mg cap Take 1 Cap by mouth two (2) times a day.     metFORMIN (GLUCOPHAGE) 1,000 mg tablet Take 1,000 mg by mouth two (2) times daily (with meals).  glipiZIDE (GLUCOTROL) 10 mg tablet Take 10 mg by mouth three (3) times daily.  amLODIPine (NORVASC) 10 mg tablet Take 10 mg by mouth daily.  ADVAIR DISKUS 250-50 mcg/dose diskus inhaler      No current facility-administered medications for this visit. Allergies/Drug Reactions  Allergies   Allergen Reactions    Lisinopril Swelling        Family History  Family History   Problem Relation Age of Onset   Sanchez Cancer Mother     Hypertension Mother     Heart Disease Mother     Diabetes Mother     No Known Problems Father         Social History  Social History     Socioeconomic History    Marital status:      Spouse name: Not on file    Number of children: Not on file    Years of education: Not on file    Highest education level: Not on file   Occupational History    Not on file   Social Needs    Financial resource strain: Not on file    Food insecurity:     Worry: Not on file     Inability: Not on file    Transportation needs:     Medical: Not on file     Non-medical: Not on file   Tobacco Use    Smoking status: Current Every Day Smoker     Packs/day: 1.00     Years: 25.00     Pack years: 25.00     Types: Cigarettes     Start date: 4/9/1986    Smokeless tobacco: Former User     Quit date: 3/16/2016   Substance and Sexual Activity    Alcohol use:  Yes     Alcohol/week: 2.4 oz     Types: 4 Shots of liquor per week    Drug use: No    Sexual activity: Yes     Partners: Female   Lifestyle    Physical activity:     Days per week: Not on file     Minutes per session: Not on file    Stress: Not on file   Relationships    Social connections:     Talks on phone: Not on file     Gets together: Not on file     Attends [employer] service: Not on file     Active member of club or organization: Not on file     Attends meetings of clubs or organizations: Not on file     Relationship status: Not on file  Intimate partner violence:     Fear of current or ex partner: Not on file     Emotionally abused: Not on file     Physically abused: Not on file     Forced sexual activity: Not on file   Other Topics Concern    Not on file   Social History Narrative    Not on file       Review of Systems  Negative except as mentioned in HPI      Physical Exam  Vital signs:   Vitals:    04/23/19 1055   BP: 105/79   Pulse: 94   Resp: 20   Temp: 96.8 °F (36 °C)   TempSrc: Oral   SpO2: 90%   Weight: 209 lb 3.2 oz (94.9 kg)   Height: 5' 7\" (1.702 m)       General: alert, oriented, not in distress  Eyes: clear conjunctivae, anicteric sclerae, full and equal ROMs  Chest/Lungs: clear breath sounds, no wheezing or crackles  Heart: normal rate, regular rhythm, no murmur  Extremities: no focal deformities, no edema  Neuro: AAOx3, CN's grossly intact  Skin: no visible abnormalities    Laboratory/Tests:    Component      Latest Ref Rng & Units 3/21/2019 1/22/2019 9/11/2018 9/11/2018          10:44 AM 11:16 AM  2:53 PM  2:53 PM   WBC      4.6 - 13.2 K/uL    8.5   RBC      4.70 - 5.50 M/uL    5.15   HGB      13.0 - 16.0 g/dL    14.7   HCT      36.0 - 48.0 %    46.9   MCV      74.0 - 97.0 FL    91.1   MCH      24.0 - 34.0 PG    28.5   MCHC      31.0 - 37.0 g/dL    31.3   RDW      11.6 - 14.5 %    16.0 (H)   PLATELET      035 - 170 K/uL    292   MPV      9.2 - 11.8 FL    11.7   NEUTROPHILS      40 - 73 %    45   LYMPHOCYTES      21 - 52 %    44   MONOCYTES      3 - 10 %    8   EOSINOPHILS      0 - 5 %    2   BASOPHILS      0 - 2 %    1   ABS. NEUTROPHILS      1.8 - 8.0 K/UL    3.8   ABS. LYMPHOCYTES      0.9 - 3.6 K/UL    3.7 (H)   ABS. MONOCYTES      0.05 - 1.2 K/UL    0.7   ABS. EOSINOPHILS      0.0 - 0.4 K/UL    0.2   ABS.  BASOPHILS      0.0 - 0.1 K/UL    0.0   DF          AUTOMATED   Sodium      136 - 145 mmol/L   141    Potassium      3.5 - 5.5 mmol/L   3.9    Chloride      100 - 108 mmol/L   101    CO2      21 - 32 mmol/L   31    Anion gap      3.0 - 18 mmol/L   9    Glucose      74 - 99 mg/dL   147 (H)    BUN      7.0 - 18 MG/DL   15    Creatinine      0.6 - 1.3 MG/DL   1.50 (H)    BUN/Creatinine ratio      12 - 20     10 (L)    GFR est AA      >60 ml/min/1.73m2   59 (L)    GFR est non-AA      >60 ml/min/1.73m2   48 (L)    Calcium      8.5 - 10.1 MG/DL   8.8    Bilirubin, total      0.2 - 1.0 MG/DL   0.3    ALT (SGPT)      16 - 61 U/L   15 (L)    AST      15 - 37 U/L   15    Alk. phosphatase      45 - 117 U/L   79    Protein, total      6.4 - 8.2 g/dL   7.5    Albumin      3.4 - 5.0 g/dL   3.3 (L)    Globulin      2.0 - 4.0 g/dL   4.2 (H)    A-G Ratio      0.8 - 1.7     0.8    Cholesterol, total      <200 MG/DL  194     Triglyceride      <150 MG/DL  194 (H)     HDL Cholesterol      40 - 60 MG/DL  46     LDL, calculated      0 - 100 MG/DL  109.2 (H)     VLDL, calculated      MG/DL  38.8     CHOL/HDL Ratio      0 - 5.0    4.2     Hemoglobin A1c (POC)      % 8.3        ECHOCARDIOGRAM 4/2019  · Left ventricular mildly decreased systolic function. Estimated left ventricular ejection fraction is 46 - 50%. Visually measured ejection fraction. Left ventricular global hypokinesis. Mild (grade 1) left ventricular diastolic dysfunction. · Mild aortic valve sclerosis with no evidence of reduced excursion. · Mitral valve non-specific thickening. Trace mitral valve regurgitation. · Right ventricular cavity size is mildly dilated. · Right atrial cavity size is mildly dilated. · Moderate tricuspid valve regurgitation is present. Moderate to severe pulmonary hypertension is present. · Mildly elevated central venous pressure (5-10 mmHg); IVC diameter is less than 21 mm and collapses less than 50% with respiration.     CT CHEST W/O CONTRAST 4/2019     History:Pulmonary hypertension with dyspnea on exertion restrictive pattern  possible interstitial disease     CT Technique:   Serial axial noncontrast helical CT was performed from lung apex to posterior  sulcus. No IV iodine containing nonionic IV contrast administered because of  severe contrast allergy,  significant renal failure  referring physician or  patient request,  or lack of IV access. This is potential limitation on  assessment of  pleural vs parenchymal or mediastinal structures.      Sagittal and coronal reformats performed from axial helical data.     Dose reduction technique:   Automated exposure control, adjustment of the mAs and/or kVp according to the  patient 's size, and iterative reconstruction techniques were used. Specifics  for this study were placed by technologist staff into the patient's electronic  medical record.     Approximate dose, if determined, reference air kerma:  Digital imaging and Communication in Medicine [DICOM] format image data are  available to nonaffiliated external healthcare facilities or entities on a  secure media free reciprocally searchable basis, with patient authorization, for  at least a 12 month period after this study. If not available when requested,  the health care system, which is responsible for storage archival and delivery,  should be contacted directly.     No prior chest CT or other recent chest exams. Comparison prior exam,  plain films, 10/31/2018     CT findings:     CT Chest      LUNGS:  Parenchyma:  Mild scattered relatively symmetric subpleural reticular lines and/or haziness,  mostly in the lower lung zones posteriorly bilaterally, nonspecific, but fairly  typical of dependent partial atelectasis and/or scarring. Minimal similar  bandlike partial atelectasis or scarring right middle lobe and lingula.     There is no evident mass, consolidation, ground glass opacity, or diffuse or  nodular interstitial thickening.     No evident bronchiectasis.     PLEURA:  No evident pleural effusion,   pneumothorax,  or pleural based  mass.     AIRWAYS:  Central airways are patent.     MEDIASTINUM:  There is mild aortic/coronary atherosclerosis. Great vessels are  of normal  caliber. Mild cardiomegaly. No pericardial effusion.     LYMPH NODES:  Mild mediastinal lymphadenopathy with numerous scattered mostly normal sized  bilateral hilar and mediastinal lymph nodes, with single enlarged subcarinal  lymph node and probable small bilateral hilar lymph nodes.     UPPER ABDOMEN: Minimal spondylosis. Old healed right ninth, 10th and 11th rib  fractures.     BONES/OSSEOUS: No acute or aggressive abnormalities identified.        MISC:  Chest wall is unremarkable.        IMPRESSION  IMPRESSION:     1. No prior recent exams. Findings consistent with mild mostly bibasilar partial atelectasis or multifocal scarring. No evidence of diffuse interstitial lung disease or bronchiectasis. Cardiomegaly. MRI of the lumbar spine without contrast. 3/5/16     Indications: 72-year-old patient with spondylolisthesis and radicular pain.     Comparisons: Lumbar spine radiographs dated 2/24/2016     Technique: T1 weighted,    T2 FSE with fat saturation, FSE inversion recovery  sagittal images are supplemented by T2 weighted with fat saturation and T1  weighted axial images.     Findings:     There is mild anterolisthesis of L4 on L5 present. The vertebral body heights  are maintained throughout the lumbar spine. There is no evidence of fracture  present. No osseous lesions or abnormal signal intensity is present. Intervertebral disc heights are largely maintained, with the exception of L4-L5  which demonstrates mild to moderate intervertebral disc height loss as well as  desiccation. Small annular fissure formation is noted at this level.       Conus medullaris ends at the L1 vertebral body level.     Correlation of axial and sagittal images reveals the following:     L1-L2: No significant disc pathology. No significant proliferative change. No  central canal or foraminal stenosis.     L2-L3: No significant disc pathology. No significant proliferative change.   No  central canal or foraminal stenosis.     L3-L4: There is a mild diffuse disc bulge, slightly eccentric to the left. No  significant proliferative change. There is minimal left neuroforaminal  narrowing without right neural foramen or central canal stenosis     L4-L5: There is a moderate and diffuse disc bulge present. This disc bulges  slightly eccentric to the right. . There is moderately severe facet hypertrophy  present bilaterally. There is moderately severe bilateral neuroforaminal  narrowing, right worse than left, with mild central canal stenosis secondary to  a combination of disc bulge, ligamentum flavum hypertrophy, and epidural  lipomatosis.     L5-S1:  There is a mild and diffuse disc bulge, slightly eccentric to the left. There is mild bilateral facet hypertrophy. There is moderate left and mild  right neuroforaminal narrowing. Mild central canal narrowing is present, most  related to epidural lipomatosis.     The visualized portions of the sacroiliac joints are unremarkable. Incidentally  imaged retroperitoneal structures are unremarkable as well.     IMPRESSION:        1. Minimal anterolisthesis of L4 on L5 secondary to moderately severe L4-L5  facet hypertrophy. 2. Moderate and diffuse L4-L5 disc bulge, slightly eccentric to the left. Moderately severe bilateral neuroforaminal narrowing is present at this level  along with mild central canal stenosis. 3. Mild and diffuse L5-S1 disc bulge with mild right and moderate left  neuroforaminal narrowing. Assessment/Plan:    1. Type 2 diabetes mellitus with diabetic neuropathy, with long-term current use of insulin ([facility])  - needs better control  - Last HEMOGLOBIN A1C 8.3%  - continue metformin and glipizide     2. Cardiomyopathy, unspecified type ([facility])  - EF 46-50%  - stop propranolol and switch to carvedilol     3.  Leg swelling  - continue Lasix     4. Essential hypertension  - controlled  - continue losartan, furosemide, amlodipine, HCTZ     5. Other emphysema ([address]  - continue Advair and Spiriva  - pulmo follow-up     6. Hypercholesteremia  - , goal is less than 90  - continue pravastatin 60mg QHS     7. Other proteinuria  - nephrology follow-up    8. Chronic low back pain  - continue gabapentin, Tylenol   - start Robaxin as needed  - continue physical therapy    9. GERD  - resolved  - stop pantoprazole      Follow-up and Dispositions    · Return in about 2 months (around 6/23/2019) for ROV. I have discussed the diagnosis with the patient and the intended plan as seen in the above orders. The patient has received an after-visit summary and questions were answered concerning future plans. I have discussed medication side effects and warnings with the patient as well. I have reviewed the plan of care with the patient, accepted their input and they are in agreement with the treatment goals.        Oliva Burt MD  April 23, 2019

## 2019-06-25 ENCOUNTER — OFFICE VISIT (OUTPATIENT)
Dept: FAMILY MEDICINE CLINIC | Age: 59
End: 2019-06-25

## 2019-06-25 ENCOUNTER — HOSPITAL ENCOUNTER (OUTPATIENT)
Dept: LAB | Age: 59
Discharge: HOME | End: 2019-06-25
Payer: MEDICARE

## 2019-06-25 VITALS
TEMPERATURE: 98.2 F | SYSTOLIC BLOOD PRESSURE: 136 MMHG | RESPIRATION RATE: 16 BRPM | WEIGHT: 203 LBS | HEART RATE: 102 BPM | BODY MASS INDEX: 31.86 KG/M2 | DIASTOLIC BLOOD PRESSURE: 81 MMHG | OXYGEN SATURATION: 98 % | HEIGHT: 67 IN

## 2019-06-25 DIAGNOSIS — I10 ESSENTIAL HYPERTENSION: ICD-10-CM

## 2019-06-25 DIAGNOSIS — I42.9 CARDIOMYOPATHY, UNSPECIFIED TYPE (HCC): ICD-10-CM

## 2019-06-25 DIAGNOSIS — M48.061 SPINAL STENOSIS OF LUMBAR REGION, UNSPECIFIED WHETHER NEUROGENIC CLAUDICATION PRESENT: ICD-10-CM

## 2019-06-25 DIAGNOSIS — E11.21 TYPE 2 DIABETES MELLITUS WITH NEPHROPATHY (HCC): ICD-10-CM

## 2019-06-25 DIAGNOSIS — Z00.00 INITIAL MEDICARE ANNUAL WELLNESS VISIT: ICD-10-CM

## 2019-06-25 DIAGNOSIS — E78.00 HYPERCHOLESTEREMIA: ICD-10-CM

## 2019-06-25 DIAGNOSIS — J43.9 PULMONARY EMPHYSEMA, UNSPECIFIED EMPHYSEMA TYPE (HCC): ICD-10-CM

## 2019-06-25 DIAGNOSIS — Z23 ENCOUNTER FOR IMMUNIZATION: ICD-10-CM

## 2019-06-25 DIAGNOSIS — M54.16 LUMBAR RADICULOPATHY: ICD-10-CM

## 2019-06-25 DIAGNOSIS — E11.40 TYPE 2 DIABETES MELLITUS WITH DIABETIC NEUROPATHY, WITHOUT LONG-TERM CURRENT USE OF INSULIN (HCC): Primary | ICD-10-CM

## 2019-06-25 DIAGNOSIS — Z13.39 SCREENING FOR ALCOHOLISM: ICD-10-CM

## 2019-06-25 LAB
ANION GAP SERPL CALC-SCNC: 5 MMOL/L (ref 3–18)
BUN SERPL-MCNC: 19 MG/DL (ref 7–18)
BUN/CREAT SERPL: 16 (ref 12–20)
CALCIUM SERPL-MCNC: 9.5 MG/DL (ref 8.5–10.1)
CHLORIDE SERPL-SCNC: 102 MMOL/L (ref 100–108)
CO2 SERPL-SCNC: 31 MMOL/L (ref 21–32)
CREAT SERPL-MCNC: 1.22 MG/DL (ref 0.6–1.3)
GLUCOSE SERPL-MCNC: 161 MG/DL (ref 74–99)
HBA1C MFR BLD HPLC: 6.7 %
POTASSIUM SERPL-SCNC: 4 MMOL/L (ref 3.5–5.5)
SODIUM SERPL-SCNC: 138 MMOL/L (ref 136–145)

## 2019-06-25 PROCEDURE — 80048 BASIC METABOLIC PNL TOTAL CA: CPT

## 2019-06-25 PROCEDURE — 36415 COLL VENOUS BLD VENIPUNCTURE: CPT

## 2019-06-25 NOTE — PATIENT INSTRUCTIONS
Medicare Wellness Visit, Male The best way to live healthy is to have a lifestyle where you eat a well-balanced diet, exercise regularly, limit alcohol use, and quit all forms of tobacco/nicotine, if applicable. Regular preventive services are another way to keep healthy. Preventive services (vaccines, screening tests, monitoring & exams) can help personalize your care plan, which helps you manage your own care. Screening tests can find health problems at the earliest stages, when they are easiest to treat. [facility] follows the current, evidence-based guidelines published by the [facility] ([facility]) when recommending preventive services for our patients. Because we follow these guidelines, sometimes recommendations change over time as research supports it. (For example, a prostate screening blood test is no longer routinely recommended for men with no symptoms.) Of course, you and your doctor may decide to screen more often for some diseases, based on your risk and co-morbidities (chronic disease you are already diagnosed with). Preventive services for you include: - Medicare offers their members a free annual wellness visit, which is time for you and your primary care provider to discuss and plan for your preventive service needs. Take advantage of this benefit every year! 
-All adults over age 72 should receive the recommended pneumonia vaccines. Current USPSTF guidelines recommend a series of two vaccines for the best pneumonia protection.  
-All adults should have a flu vaccine yearly and an ECG.  All adults age 61 and older should receive a shingles vaccine once in their lifetime.   
-All adults age 38-68 who are overweight should have a diabetes screening test once every three years.  
-Other screening tests & preventive services for persons with diabetes include: an eye exam to screen for diabetic retinopathy, a kidney function test, a foot exam, and stricter control over your cholesterol.  
-Cardiovascular screening for adults with routine risk involves an electrocardiogram (ECG) at intervals determined by the provider.  
-Colorectal cancer screening should be done for adults age 54-65 with no increased risk factors for colorectal cancer. There are a number of acceptable methods of screening for this type of cancer. Each test has its own benefits and drawbacks. Discuss with your provider what is most appropriate for you during your annual wellness visit. The different tests include: colonoscopy (considered the best screening method), a fecal occult blood test, a fecal DNA test, and sigmoidoscopy. 
-All adults born between [facility] should be screened once for Hepatitis C. 
-An Abdominal Aortic Aneurysm (AAA) Screening is recommended for men age 73-68 who has ever smoked in their lifetime. Here is a list of your current Health Maintenance items (your personalized list of preventive services) with a due date: 
Health Maintenance Due Topic Date Due Harjito.Narayan Eye Exam  03/08/1972  Shingles Vaccine (1 of 2) 03/08/2012  Pneumococcal Vaccine (2 of 3 - PCV13) 07/27/2017  Albumin Urine Test  05/08/2019 Embo.Gravhugh Diabetic Foot Care  06/21/2019 Embo.Gravhugh Annual Well Visit  06/09/2019

## 2019-06-25 NOTE — PROGRESS NOTES
History of Present Illness  Drake Fish is a 64 y.o. male who presents today for management of    Chief Complaint   Patient presents with    Hypertension    Cholesterol Problem    Diabetes    Annual Wellness Visit     Back Pain  Patient presents for presents evaluation of low back problems. Symptoms have been present for a few months and include pain in right lower back (burning, tingling in character; 8/10 in severity). Initial inciting event: none. It is associated with numbness and itnlging on the right lower leg. Previous lower back problems: yes. Previous workup: MRI. Previous treatments: PT (effective), epidural injection, medication. Diabetes Mellitus:  He has diabetes mellitus, and  hypertension and hyperlipidemia. Diabetic ROS - medication compliance: compliant all of the time, diabetic diet compliance: compliant most of the time, home glucose monitoring: is performed sporadically, further diabetic ROS: no polyuria or polydipsia, no chest pain, dyspnea or TIA's, no numbness, tingling or pain in extremities. Lab review: orders written for new lab studies as appropriate; see orders. Cardiovascular Review:  The patient has hypertension, hyperlipidemia and cardiomyopathy. Diet and Lifestyle: generally follows a low fat low cholesterol diet  Home BP Monitoring: is not measured at home. Pertinent ROS: taking medications as instructed.  Patient has not been taking carvedilol, has been on propranolol prescribed by the [address], no medication side effects noted, no TIA's, no chest pain on exertion, notes stable dyspnea on exertion, no change, noting swelling of ankles.     Problem List  Patient Active Problem List    Diagnosis Date Noted    Spinal stenosis of lumbar region 06/25/2019    Cardiomyopathy ([facility] 04/23/2019    Other proteinuria 03/07/2019    Type 2 diabetes mellitus with diabetic neuropathy ([facility] 02/09/2018    Type 2 diabetes mellitus with nephropathy ([facility] 12/22/2017    Clubbing of nail 03/23/2016    Chronic obstructive pulmonary disease ([facility] 03/23/2016    JAIMEE (obstructive sleep apnea) 09/17/2015    Midline low back pain without sciatica 09/03/2015    Essential hypertension 09/03/2015    Hypercholesteremia 09/03/2015    Hip pain 09/03/2015       Past Medical History  Past Medical History:   Diagnosis Date    Back pain     Chronic obstructive pulmonary disease ([facility]     Diabetes ([facility]     Hypercholesterolemia     Hypertension     Sleep apnea     Does not use CPAP        Surgical History  Past Surgical History:   Procedure Laterality Date    COLONOSCOPY N/A 1/23/2017    COLONOSCOPY performed by Lawyer Gabe MD at [address] [place] [address] [address]  2003    umbilacal     HX SHOULDER ARTHROSCOPY  2004    bilateral        Current Medications  Current Outpatient Medications   Medication Sig    ADVAIR DISKUS 250-50 mcg/dose diskus inhaler     carvedilol (COREG) 6.25 mg tablet Take 1 Tab by mouth two (2) times daily (with meals).  multivit with min-folic acid 285 mcg TbER Take 1 Cap by mouth daily.  methocarbamol (ROBAXIN) 500 mg tablet Take 1 Tab by mouth every six (6) hours as needed (muscle spasm).  tiotropium (SPIRIVA WITH HANDIHALER) 18 mcg inhalation capsule Take 1 Cap by inhalation daily.  furosemide (LASIX) 40 mg tablet Take 1 Tab by mouth daily.  albuterol (PROVENTIL HFA, VENTOLIN HFA, PROAIR HFA) 90 mcg/actuation inhaler Take 2 Puffs by inhalation every four (4) hours as needed for Wheezing or Shortness of Breath.  cetirizine (ZYRTEC) 10 mg tablet TAKE 1 TABLET BY MOUTH ONCE DAILY    aspirin 81 mg chewable tablet Take 1 Tab by mouth daily.  losartan (COZAAR) 50 mg tablet Take 1 Tab by mouth daily.  gabapentin (NEURONTIN) 300 mg capsule Take 1 Cap by mouth four (4) times daily. (Patient taking differently: Take 300 mg by mouth two (2) times a day.)    hydroCHLOROthiazide (HYDRODIURIL) 25 mg tablet Take 1 Tab by mouth daily.     pravastatin (PRAVACHOL) 40 mg tablet Take 1.5 Tabs by mouth nightly. Take one-half tablet by mouth at bedtime    carboxymethylcellulose sodium (REFRESH TEARS) 0.5 % drop ophthalmic solution Administer 1 Drop to both eyes two (2) times a day.  clobetasol (TEMOVATE) 0.05 % ointment Apply  to affected area two (2) times a day.  cholecalciferol (VITAMIN D3) 1,000 unit tablet Take 1,000 Units by mouth daily.  omega-3 fatty acids-vitamin e (FISH OIL) 1,000 mg cap Take 1 Cap by mouth two (2) times a day.  metFORMIN (GLUCOPHAGE) 1,000 mg tablet Take 1,000 mg by mouth two (2) times daily (with meals).  glipiZIDE (GLUCOTROL) 10 mg tablet Take 10 mg by mouth three (3) times daily.  amLODIPine (NORVASC) 10 mg tablet Take 10 mg by mouth daily. No current facility-administered medications for this visit. Allergies/Drug Reactions  Allergies   Allergen Reactions    Lisinopril Swelling        Family History  Family History   Problem Relation Age of Onset   [facility] Cancer Mother     Hypertension Mother     Heart Disease Mother     Diabetes Mother     No Known Problems Father         Social History  Social History     Socioeconomic History    Marital status:      Spouse name: Not on file    Number of children: Not on file    Years of education: Not on file    Highest education level: Not on file   Occupational History    Not on file   Social Needs    Financial resource strain: Not on file    Food insecurity:     Worry: Not on file     Inability: Not on file    Transportation needs:     Medical: Not on file     Non-medical: Not on file   Tobacco Use    Smoking status: Current Every Day Smoker     Packs/day: 1.00     Years: 25.00     Pack years: 25.00     Types: Cigarettes     Start date: 4/9/1986    Smokeless tobacco: Former User     Quit date: 3/16/2016   Substance and Sexual Activity    Alcohol use:  Yes     Alcohol/week: 2.4 oz     Types: 4 Shots of liquor per week    Drug use: No    Sexual activity: Yes     Partners: Female   Lifestyle    Physical activity:     Days per week: Not on file     Minutes per session: Not on file    Stress: Not on file   Relationships    Social connections:     Talks on phone: Not on file     Gets together: Not on file     Attends [employer]oration[employer] service: Not on file     Active member of club or organization: Not on file     Attends meetings of clubs or organizations: Not on file     Relationship status: Not on file    Intimate partner violence:     Fear of current or ex partner: Not on file     Emotionally abused: Not on file     Physically abused: Not on file     Forced sexual activity: Not on file   Other Topics Concern    Not on file   Social History Narrative    Not on file       Review of Systems  Negative except as mentioned in HPI      Physical Exam  Vital signs:   Vitals:    06/25/19 0949   BP: 136/81   Pulse: (!) 102   Resp: 16   Temp: 98.2 °F (36.8 °C)   TempSrc: Oral   SpO2: 98%   Weight: 203 lb (92.1 kg)   Height: 5' 7\" (1.702 m)       General: alert, oriented, not in distress  Eyes: clear conjunctivae, anicteric sclerae, full and equal ROMs  Chest/Lungs: clear breath sounds, no wheezing or crackles  Heart: normal rate, regular rhythm, no murmur  Extremities: no focal deformities, no edema  Neuro: AAOx3, CN's grossly intact  Skin: no visible abnormalities  Lumbar: No rash, ecchymosis, or gross obliquity. No fasciculations. No focal atrophy is noted. No pain with hip ROM. Full range of motion. No tenderness to palpation. SI joints non-tender. Trochanters non tender. Straight leg raising negative. Musculoskeletal: strength 5/5 on both upper and lower extremities. Sensation in the bilateral legs grossly intact to light touch. Foot exam: no open cuts, ulcers or wounds. DP full and equal. Sensation intact with 5.07 g monofilament wire bilaterally. Laboratory/Tests:    MRI LUMBAR SPINE 2016     1.  Minimal anterolisthesis of L4 on L5 secondary to moderately severe L4-L5 facet hypertrophy. 2. Moderate and diffuse L4-L5 disc bulge, slightly eccentric to the left. Moderately severe bilateral neuroforaminal narrowing is present at this level along with mild central canal stenosis. 3. Mild and diffuse L5-S1 disc bulge with mild right and moderate left  neuroforaminal narrowing. Component      Latest Ref Rng & Units 1/22/2019 10/19/2018 9/11/2018 9/11/2018          11:16 AM 11:53 AM  2:53 PM  2:53 PM   Sodium      136 - 145 mmol/L  136  141   Potassium      3.5 - 5.5 mmol/L  4.0  3.9   Chloride      100 - 108 mmol/L  97 (L)  101   CO2      21 - 32 mmol/L  29  31   Anion gap      3.0 - 18 mmol/L  10  9   Glucose      74 - 99 mg/dL  137 (H)  147 (H)   BUN      7.0 - 18 MG/DL  12  15   Creatinine      0.6 - 1.3 MG/DL  0.92  1.50 (H)   BUN/Creatinine ratio      12 - 20    13  10 (L)   GFR est AA      >60 ml/min/1.73m2  >60  59 (L)   GFR est non-AA      >60 ml/min/1.73m2  >60  48 (L)   Calcium      8.5 - 10.1 MG/DL  8.6  8.8   Bilirubin, total      0.2 - 1.0 MG/DL    0.3   ALT (SGPT)      16 - 61 U/L    15 (L)   AST      15 - 37 U/L    15   Alk. phosphatase      45 - 117 U/L    79   Protein, total      6.4 - 8.2 g/dL    7.5   Albumin      3.4 - 5.0 g/dL    3.3 (L)   Globulin      2.0 - 4.0 g/dL    4.2 (H)   A-G Ratio      0.8 - 1.7      0.8   Cholesterol, total      <200 MG/  194    Triglyceride      <150 MG/ (H)  371 (H)    HDL Cholesterol      40 - 60 MG/DL 46  37 (L)    LDL, calculated      0 - 100 MG/.2 (H)  82.8    VLDL, calculated      MG/DL 38.8  74.2    CHOL/HDL Ratio      0 - 5.0   4.2  5.2 (H)        Assessment/Plan:    1. Type 2 diabetes mellitus with diabetic neuropathy, without long-term current use of insulin (HCC)  - controlled  - AMB POC HEMOGLOBIN A1C 6.7%  -  DIABETES FOOT EXAM    2. Type 2 diabetes mellitus with nephropathy (HCC)  - AMB POC HEMOGLOBIN A1C  - MICROALBUMIN, UR, RAND W/ MICROALB/CREAT RATIO; Future    3. Cardiomyopathy, unspecified type ([facility]  - EF 46-50%  - continue ARB, BB    4. Essential hypertension  - controlled  - METABOLIC PANEL, BASIC; Future    5. Hypercholesteremia  - controlled    6. Pulmonary emphysema, unspecified emphysema type ([facility]  - stable    7. Spinal stenosis of lumbar region, unspecified whether neurogenic claudication present  - REFERRAL TO SPINE SURGERY  - continue gabapentin    8. Lumbar radiculopathy  - REFERRAL TO SPINE SURGERY        Follow-up and Dispositions    · Return in about 3 months (around 9/25/2019) for ROV. I have discussed the diagnosis with the patient and the intended plan as seen in the above orders. The patient has received an after-visit summary and questions were answered concerning future plans. I have discussed medication side effects and warnings with the patient as well. I have reviewed the plan of care with the patient, accepted their input and they are in agreement with the treatment goals. Laurita Curiel MD  June 25, 2019        This is an Initial Medicare Annual Wellness Exam (AWV) (Performed 12 months after IPPE or effective date of Medicare Part B enrollment, Once in a lifetime)    I have reviewed the patient's medical history in detail and updated the computerized patient record. History     Past Medical History:   Diagnosis Date    Back pain     Chronic obstructive pulmonary disease ([facility]     Diabetes ([facility]     Hypercholesterolemia     Hypertension     Sleep apnea     Does not use CPAP      Past Surgical History:   Procedure Laterality Date    COLONOSCOPY N/A 1/23/2017    COLONOSCOPY performed by Erick Mendoza MD at [address] [place] [address]  2003    umbilacal     HX SHOULDER ARTHROSCOPY  2004    bilateral     Current Outpatient Medications   Medication Sig Dispense Refill    ADVAIR DISKUS 250-50 mcg/dose diskus inhaler       carvedilol (COREG) 6.25 mg tablet Take 1 Tab by mouth two (2) times daily (with meals). 180 Tab 1    multivit with min-folic acid 370 mcg TbER Take 1 Cap by mouth daily. 90 Tab 3    methocarbamol (ROBAXIN) 500 mg tablet Take 1 Tab by mouth every six (6) hours as needed (muscle spasm). 90 Tab 1    tiotropium (SPIRIVA WITH HANDIHALER) 18 mcg inhalation capsule Take 1 Cap by inhalation daily. 30 Cap 11    furosemide (LASIX) 40 mg tablet Take 1 Tab by mouth daily. 90 Tab 1    albuterol (PROVENTIL HFA, VENTOLIN HFA, PROAIR HFA) 90 mcg/actuation inhaler Take 2 Puffs by inhalation every four (4) hours as needed for Wheezing or Shortness of Breath. 1 Inhaler 3    cetirizine (ZYRTEC) 10 mg tablet TAKE 1 TABLET BY MOUTH ONCE DAILY 30 Tab 3    aspirin 81 mg chewable tablet Take 1 Tab by mouth daily. 90 Tab 1    losartan (COZAAR) 50 mg tablet Take 1 Tab by mouth daily. 30 Tab 3    gabapentin (NEURONTIN) 300 mg capsule Take 1 Cap by mouth four (4) times daily. (Patient taking differently: Take 300 mg by mouth two (2) times a day.) 120 Cap 3    hydroCHLOROthiazide (HYDRODIURIL) 25 mg tablet Take 1 Tab by mouth daily. 90 Tab 0    pravastatin (PRAVACHOL) 40 mg tablet Take 1.5 Tabs by mouth nightly. Take one-half tablet by mouth at bedtime 90 Tab 1    carboxymethylcellulose sodium (REFRESH TEARS) 0.5 % drop ophthalmic solution Administer 1 Drop to both eyes two (2) times a day. 30 mL 3    clobetasol (TEMOVATE) 0.05 % ointment Apply  to affected area two (2) times a day. 15 g 2    cholecalciferol (VITAMIN D3) 1,000 unit tablet Take 1,000 Units by mouth daily.  omega-3 fatty acids-vitamin e (FISH OIL) 1,000 mg cap Take 1 Cap by mouth two (2) times a day. 60 Cap 3    metFORMIN (GLUCOPHAGE) 1,000 mg tablet Take 1,000 mg by mouth two (2) times daily (with meals).  glipiZIDE (GLUCOTROL) 10 mg tablet Take 10 mg by mouth three (3) times daily.  amLODIPine (NORVASC) 10 mg tablet Take 10 mg by mouth daily.        Allergies   Allergen Reactions    Lisinopril Swelling     Family History   Problem Relation Age of Onset    Cancer Mother     Hypertension Mother     Heart Disease Mother     Diabetes Mother     No Known Problems Father      Social History     Tobacco Use    Smoking status: Current Every Day Smoker     Packs/day: 1.00     Years: 25.00     Pack years: 25.00     Types: Cigarettes     Start date: 4/9/1986    Smokeless tobacco: Former User     Quit date: 3/16/2016   Substance Use Topics    Alcohol use: Yes     Alcohol/week: 2.4 oz     Types: 4 Shots of liquor per week     Patient Active Problem List   Diagnosis Code    Midline low back pain without sciatica M54.5    Essential hypertension I10    Hypercholesteremia E78.00    Hip pain M25.559    JAIMEE (obstructive sleep apnea) G47.33    Clubbing of nail R68.3    Chronic obstructive pulmonary disease (HCC) J44.9    Type 2 diabetes mellitus with nephropathy (HCC) E11.21    Type 2 diabetes mellitus with diabetic neuropathy (HCC) E11.40    Other proteinuria R80.8    Cardiomyopathy ([facility] I42.9    Spinal stenosis of lumbar region M48.061       Depression Risk Factor Screening:     3 most recent PHQ Screens 3/21/2019   Little interest or pleasure in doing things Not at all   Feeling down, depressed, irritable, or hopeless Not at all   Total Score PHQ 2 0     Alcohol Risk Factor Screening: You do not drink alcohol or very rarely. On any occasion in the past three months you have had more than 7 drinks containing alcohol No    Functional Ability and Level of Safety:     Hearing Loss  Hearing is good. Activities of Daily Living  The home contains: no safety equipment. Patient does total self care    Fall Risk  No flowsheet data found.     Abuse Screen  Patient is not abused    Cognitive Screening   Evaluation of Cognitive Function:  Has your family/caregiver stated any concerns about your memory: no  Normal    Patient Care Team   Patient Care Team:  Abhishek Moraes MD as PCP - General (Internal Medicine)  Jomarie Cabot, MD (Orthopedic Surgery)  Johnny Croft MD (Nephrology)    Assessment/Plan   Education and counseling provided:  Are appropriate based on today's review and evaluation  Pneumococcal Vaccine    Diagnoses and all orders for this visit:    1. Type 2 diabetes mellitus with diabetic neuropathy, without long-term current use of insulin (HCC)  -     AMB POC HEMOGLOBIN A1C  -      DIABETES FOOT EXAM    2. Type 2 diabetes mellitus with nephropathy (HCC)  -     AMB POC HEMOGLOBIN A1C  -     MICROALBUMIN, UR, RAND W/ MICROALB/CREAT RATIO; Future    3. Cardiomyopathy, unspecified type ([facility]    4. Essential hypertension  -     METABOLIC PANEL, BASIC; Future    5. Hypercholesteremia    6. Pulmonary emphysema, unspecified emphysema type ([facility]    7. Initial Medicare annual wellness visit    8. Screening for alcoholism  -     CT ANNUAL ALCOHOL SCREEN 15 MIN    9. Spinal stenosis of lumbar region, unspecified whether neurogenic claudication present  -     REFERRAL TO SPINE SURGERY    10. Lumbar radiculopathy  -     REFERRAL TO SPINE SURGERY    11.  Encounter for immunization  -     PNEUMOCOCCAL CONJ VACCINE 13 VALENT IM  -     CT IMMUNIZ ADMIN,1 SINGLE/COMB VAC/TOXOID         Health Maintenance Due   Topic Date Due    EYE EXAM RETINAL OR DILATED  03/08/1972    Shingrix Vaccine Age 50> (1 of 2) 03/08/2012    Pneumococcal 0-64 years (2 of 3 - PCV13) 07/27/2017    MICROALBUMIN Q1  05/08/2019    FOOT EXAM Q1  06/21/2019    MEDICARE YEARLY EXAM  06/09/2019

## 2019-07-11 DIAGNOSIS — J30.2 OTHER SEASONAL ALLERGIC RHINITIS: ICD-10-CM

## 2019-07-12 RX ORDER — CETIRIZINE HCL 10 MG
TABLET ORAL
Qty: 30 TAB | Refills: 3 | OUTPATIENT
Start: 2019-07-12

## 2019-08-08 ENCOUNTER — OFFICE VISIT (OUTPATIENT)
Dept: ORTHOPEDIC SURGERY | Age: 59
End: 2019-08-08

## 2019-08-08 VITALS
RESPIRATION RATE: 20 BRPM | WEIGHT: 205.4 LBS | HEIGHT: 67 IN | BODY MASS INDEX: 32.24 KG/M2 | SYSTOLIC BLOOD PRESSURE: 140 MMHG | TEMPERATURE: 97.8 F | HEART RATE: 111 BPM | OXYGEN SATURATION: 95 % | DIASTOLIC BLOOD PRESSURE: 83 MMHG

## 2019-08-08 DIAGNOSIS — M54.2 CERVICAL PAIN: ICD-10-CM

## 2019-08-08 DIAGNOSIS — F41.9 ANXIETY: ICD-10-CM

## 2019-08-08 DIAGNOSIS — M48.062 SPINAL STENOSIS OF LUMBAR REGION WITH NEUROGENIC CLAUDICATION: Primary | ICD-10-CM

## 2019-08-08 DIAGNOSIS — M54.16 LUMBAR RADICULOPATHY: ICD-10-CM

## 2019-08-08 DIAGNOSIS — M54.50 LUMBAR SPINE PAIN: ICD-10-CM

## 2019-08-08 RX ORDER — DIAZEPAM 5 MG/1
5 TABLET ORAL AS NEEDED
Qty: 2 TAB | Refills: 0 | Status: ON HOLD | OUTPATIENT
Start: 2019-08-08 | End: 2019-08-21

## 2019-08-08 RX ORDER — AMITRIPTYLINE HYDROCHLORIDE 25 MG/1
75 TABLET, FILM COATED ORAL
Qty: 90 TAB | Refills: 2 | Status: SHIPPED | OUTPATIENT
Start: 2019-08-08 | End: 2020-01-20 | Stop reason: SDUPTHER

## 2019-08-08 RX ORDER — PREGABALIN 150 MG/1
150 CAPSULE ORAL 2 TIMES DAILY
Qty: 60 CAP | Refills: 2 | Status: SHIPPED | OUTPATIENT
Start: 2019-08-08 | End: 2019-08-08

## 2019-08-08 RX ORDER — PREGABALIN 75 MG/1
75 CAPSULE ORAL 2 TIMES DAILY
Qty: 28 CAP | Refills: 0 | Status: SHIPPED | OUTPATIENT
Start: 2019-08-08 | End: 2019-08-08

## 2019-08-08 NOTE — PROGRESS NOTES
[address]  [address], [address]  [place]  Phone: [phone]  Fax: [phone]        Zachary Nunez  : 1960  PCP: Roxana Skaggs MD  2019    NEW PATIENT      HISTORY OF PRESENT ILLNESS  Georgi Webber is a 64 y.o. male c/o low back pain with numbness radiating into his BLE (R>L). Pt notes he has a limited standing tolerance of 8-10 minutes. He finds relief with sitting. However, prolonged sitting or stretching his right leg will exacerbate his pain. He completed PT in March or April off of [address] in [facility]. He has been taking Gabapentin 600 mg TID. He admits to a + shopping cart sign. He states that when his legs go numb, they feel heavy. He notes that he had a lumbar THEO in December (he had 3 injections last year) with improvement for about 4 months. He has also been taking muscle relaxants and Tramadol. He notes that he began treatment with the [place] as he was injured in [place]. He also c/o neck and upper back pain. He rates his pain as an 8/10 today. ASSESSMENT  His symptoms are likely due to lumbar spinal stenosis with neurogenic claudication vs lumbar radiculopathy. PLAN  1. Lumbar MRI to evaluate progression of lumbar spinal stenosis/lumbar radiculopathy. 2. L3-4 interlaminar injection. 3. Lyrica 75 mg BID for 2 weeks, then 150 mg BID moving forward. We can increase Gabapentin to 900 mg TID if he finds it difficult to obtain Lyrica. Pt will f/u in 2 weeks after injection or sooner if needed. Diagnoses and all orders for this visit:    1. Spinal stenosis of lumbar region with neurogenic claudication  -     MRI LUMB SPINE WO CONT; Future  -     SCHEDULE SURGERY  -     pregabalin (LYRICA) 75 mg capsule; Take 1 Cap by mouth two (2) times a day. Max Daily Amount: 150 mg.  -     pregabalin (LYRICA) 150 mg capsule; Take 1 Cap by mouth two (2) times a day.  Max Daily Amount: 300 mg.    2. Lumbar radiculopathy  -     MRI LUMB SPINE WO CONT; Future  -     SCHEDULE SURGERY  -     pregabalin (LYRICA) 75 mg capsule; Take 1 Cap by mouth two (2) times a day. Max Daily Amount: 150 mg.  -     pregabalin (LYRICA) 150 mg capsule; Take 1 Cap by mouth two (2) times a day. Max Daily Amount: 300 mg.    3. Lumbar spine pain  -     AMB POC XRAY, SPINE, LUMBOSACRAL; 2 O  -     MRI LUMB SPINE WO CONT; Future    4. Cervical pain  -     AMB POC XRAY, SPINE, CERVICAL; 2 OR 3             CHIEF COMPLAINT  Rubina Baum is seen today in consultation at the request of Mirna Villegas MD for complaints of low back pain radiating into BLE. PAST MEDICAL HISTORY   Past Medical History:   Diagnosis Date    Back pain     [facility] [address]     Diabetes [facility] [address]     Hypercholesterolemia     Hypertension     Sleep apnea     Does not use CPAP       Past Surgical History:   Procedure Laterality Date    COLONOSCOPY N/A 1/23/2017    COLONOSCOPY performed by Lluvia Doyle MD at [address] [place] [address]  2003    umbilacal     HX SHOULDER ARTHROSCOPY  2004    bilateral       MEDICATIONS    Current Outpatient Medications   Medication Sig Dispense Refill    ADVAIR DISKUS 250-50 mcg/dose diskus inhaler       carvedilol (COREG) 6.25 mg tablet Take 1 Tab by mouth two (2) times daily (with meals). 180 Tab 1    multivit with min-folic acid 118 mcg TbER Take 1 Cap by mouth daily. 90 Tab 3    methocarbamol (ROBAXIN) 500 mg tablet Take 1 Tab by mouth every six (6) hours as needed (muscle spasm). 90 Tab 1    tiotropium (SPIRIVA WITH HANDIHALER) 18 mcg inhalation capsule Take 1 Cap by inhalation daily. 30 Cap 11    furosemide (LASIX) 40 mg tablet Take 1 Tab by mouth daily. 90 Tab 1    albuterol (PROVENTIL HFA, VENTOLIN HFA, PROAIR HFA) 90 mcg/actuation inhaler Take 2 Puffs by inhalation every four (4) hours as needed for Wheezing or Shortness of Breath.  1 Inhaler 3    cetirizine (ZYRTEC) 10 mg tablet TAKE 1 TABLET BY MOUTH ONCE DAILY 30 Tab 3    aspirin 81 mg chewable tablet Take 1 Tab by mouth daily. 90 Tab 1    losartan (COZAAR) 50 mg tablet Take 1 Tab by mouth daily. 30 Tab 3    gabapentin (NEURONTIN) 300 mg capsule Take 1 Cap by mouth four (4) times daily. (Patient taking differently: Take 300 mg by mouth two (2) times a day.) 120 Cap 3    hydroCHLOROthiazide (HYDRODIURIL) 25 mg tablet Take 1 Tab by mouth daily. 90 Tab 0    pravastatin (PRAVACHOL) 40 mg tablet Take 1.5 Tabs by mouth nightly. Take one-half tablet by mouth at bedtime 90 Tab 1    carboxymethylcellulose sodium (REFRESH TEARS) 0.5 % drop ophthalmic solution Administer 1 Drop to both eyes two (2) times a day. 30 mL 3    clobetasol (TEMOVATE) 0.05 % ointment Apply  to affected area two (2) times a day. 15 g 2    cholecalciferol (VITAMIN D3) 1,000 unit tablet Take 1,000 Units by mouth daily.  omega-3 fatty acids-vitamin e (FISH OIL) 1,000 mg cap Take 1 Cap by mouth two (2) times a day. 60 Cap 3    metFORMIN (GLUCOPHAGE) 1,000 mg tablet Take 1,000 mg by mouth two (2) times daily (with meals).  glipiZIDE (GLUCOTROL) 10 mg tablet Take 10 mg by mouth three (3) times daily.  amLODIPine (NORVASC) 10 mg tablet Take 10 mg by mouth daily. ALLERGIES  Allergies   Allergen Reactions    Lisinopril Swelling          SOCIAL HISTORY    Social History     Socioeconomic History    Marital status:      Spouse name: Not on file    Number of children: Not on file    Years of education: Not on file    Highest education level: Not on file   Tobacco Use    Smoking status: Current Every Day Smoker     Packs/day: 1.00     Years: 25.00     Pack years: 25.00     Types: Cigarettes     Start date: 4/9/1986    Smokeless tobacco: Former User     Quit date: 3/16/2016   Substance and Sexual Activity    Alcohol use:  Yes     Alcohol/week: 4.0 standard drinks     Types: 4 Shots of liquor per week    Drug use: No    Sexual activity: Yes     Partners: Female       FAMILY HISTORY  Family History   Problem Relation Age of Onset    Cancer Mother     Hypertension Mother     Heart Disease Mother     Diabetes Mother     No Known Problems Father          REVIEW OF SYSTEMS  Review of Systems   Musculoskeletal: Positive for back pain. BLE paraesthesia (R>L)         PHYSICAL EXAMINATION  There were no vitals taken for this visit. Pain Assessment  3/14/2016   Location of Pain Back   Location Modifiers Medial   Severity of Pain 8   Quality of Pain Sharp; Throbbing;Dull   Duration of Pain Persistent   Frequency of Pain Constant   Date Pain First Started -   Aggravating Factors Bending;Walking;Standing;Squatting   Limiting Behavior Yes   Relieving Factors Rest   Relieving Factors Comment -   Result of Injury No   Work-Related Injury -   Type of Injury -   Type of Injury Comment -         Constitutional:  Well developed, well nourished, in no acute distress. Psychiatric: Affect and mood are appropriate. HEENT: Normocephalic, atraumatic. Extraocular movements intact. Integumentary: No rashes or abrasions noted on exposed areas. Cardiovascular: Regular rate and rhythm. Pulmonary: Clear to auscultation bilaterally. SPINE/MUSCULOSKELETAL EXAM    Cervical spine:  Neck is midline. Normal muscle tone. No focal atrophy is noted. ROM pain free. Shoulder ROM intact. Tenderness to palpation. Negative Spurling's sign. Negative Tinel's sign. Negative Yanes's sign. Sensation in the bilateral arms grossly intact to light touch. Lumbar spine:  No rash, ecchymosis, or gross obliquity. No fasciculations. No focal atrophy is noted. No pain with hip ROM. Full range of motion. No tenderness to palpation. No tenderness to palpation at the sciatic notch. SI joints non-tender. Trochanters non tender. Sensation in the bilateral legs grossly intact to light touch.       MOTOR:      Biceps Triceps Deltoids Wrist Ext Wrist Flex Hand Intrin   Right 5/5 5/5 5/5 5/5 5/5 5/5   Left 5/5 5/5 5/5 5/5 5/5 5/5             Hip Flex  Quads Hamstrings Ankle DF EHL Ankle PF   Right 5/5 5/5 5/5 5/5 5/5 5/5   Left 5/5 5/5 5/5 5/5 5/5 5/5     DTRs are No biceps, triceps, brachioradialis, patella, and Achilles. Negative Straight Leg raise. Squat not tested. No difficulty with tandem gait. Ambulation without assistive device. FWB. (Pt notes he ambulates with a single point cane,but he left it in his car today)      RADIOGRAPHS  Lumbar XR images taken on 8/8/19 personally reviewed with patient:  Facet sclerosis  Spondylolisthesis - anterolisthesis of L4 on L5  Disc space narrowing at L4-5  No obvious compression fractures. Lumbar MRI images taken on 3/5/16:  There is mild anterolisthesis of L4 on L5 present. The vertebral body heights  are maintained throughout the lumbar spine. There is no evidence of fracture  present. No osseous lesions or abnormal signal intensity is present. Intervertebral disc heights are largely maintained, with the exception of L4-L5  which demonstrates mild to moderate intervertebral disc height loss as well as  desiccation. Small annular fissure formation is noted at this level.       Conus medullaris ends at the L1 vertebral body level.     Correlation of axial and sagittal images reveals the following:     L1-L2: No significant disc pathology. No significant proliferative change. No  central canal or foraminal stenosis.     L2-L3: No significant disc pathology. No significant proliferative change. No  central canal or foraminal stenosis.     L3-L4: There is a mild diffuse disc bulge, slightly eccentric to the left. No  significant proliferative change. There is minimal left neuroforaminal  narrowing without right neural foramen or central canal stenosis     L4-L5: There is a moderate and diffuse disc bulge present. This disc bulges  slightly eccentric to the right. . There is moderately severe facet hypertrophy  present bilaterally. There is moderately severe bilateral neuroforaminal  narrowing, right worse than left, with mild central canal stenosis secondary to  a combination of disc bulge, ligamentum flavum hypertrophy, and epidural  lipomatosis.     L5-S1:  There is a mild and diffuse disc bulge, slightly eccentric to the left. There is mild bilateral facet hypertrophy. There is moderate left and mild  right neuroforaminal narrowing. Mild central canal narrowing is present, most  related to epidural lipomatosis.     The visualized portions of the sacroiliac joints are unremarkable. Incidentally  imaged retroperitoneal structures are unremarkable as well.     IMPRESSION:        1. Minimal anterolisthesis of L4 on L5 secondary to moderately severe L4-L5  facet hypertrophy. 2. Moderate and diffuse L4-L5 disc bulge, slightly eccentric to the left. Moderately severe bilateral neuroforaminal narrowing is present at this level  along with mild central canal stenosis. 3. Mild and diffuse L5-S1 disc bulge with mild right and moderate left  neuroforaminal narrowing.  reviewed    Mr. Axel Avina has a reminder for a \"due or due soon\" health maintenance. I have asked that he contact his primary care provider for follow-up on this health maintenance. 18 minutes of face-to-face contact were spent with the patient during today's visit extensively discussing symptoms and treatment plan. All questions were answered. More than half of this visit today was spent on counseling. Written by Yomi Hathaway, as dictated by Dr. Luis A Davis. I, Dr. Luis A Davis, confirm that all documentation is accurate.

## 2019-08-15 ENCOUNTER — HOSPITAL ENCOUNTER (OUTPATIENT)
Dept: MRI IMAGING | Age: 59
Discharge: HOME | End: 2019-08-15
Attending: PHYSICAL MEDICINE & REHABILITATION
Payer: MEDICARE

## 2019-08-15 DIAGNOSIS — M48.062 SPINAL STENOSIS OF LUMBAR REGION WITH NEUROGENIC CLAUDICATION: ICD-10-CM

## 2019-08-15 DIAGNOSIS — M54.50 LUMBAR SPINE PAIN: ICD-10-CM

## 2019-08-15 DIAGNOSIS — M54.16 LUMBAR RADICULOPATHY: ICD-10-CM

## 2019-08-15 PROCEDURE — 72148 MRI LUMBAR SPINE W/O DYE: CPT

## 2019-08-21 ENCOUNTER — APPOINTMENT (OUTPATIENT)
Dept: GENERAL RADIOLOGY | Age: 59
End: 2019-08-21
Attending: PHYSICAL MEDICINE & REHABILITATION
Payer: MEDICARE

## 2019-08-21 ENCOUNTER — HOSPITAL ENCOUNTER (OUTPATIENT)
Age: 59
Setting detail: OUTPATIENT SURGERY
Discharge: HOME | End: 2019-08-21
Attending: PHYSICAL MEDICINE & REHABILITATION | Admitting: PHYSICAL MEDICINE & REHABILITATION
Payer: MEDICARE

## 2019-08-21 VITALS
DIASTOLIC BLOOD PRESSURE: 100 MMHG | OXYGEN SATURATION: 90 % | HEART RATE: 105 BPM | TEMPERATURE: 98.4 F | SYSTOLIC BLOOD PRESSURE: 153 MMHG | RESPIRATION RATE: 18 BRPM

## 2019-08-21 LAB — GLUCOSE BLD STRIP.AUTO-MCNC: 154 MG/DL (ref 70–110)

## 2019-08-21 PROCEDURE — 74011250637 HC RX REV CODE- 250/637: Performed by: PHYSICAL MEDICINE & REHABILITATION

## 2019-08-21 PROCEDURE — 77030014124 HC TY EPDRL BBMI -A: Performed by: PHYSICAL MEDICINE & REHABILITATION

## 2019-08-21 PROCEDURE — 74011636320 HC RX REV CODE- 636/320: Performed by: PHYSICAL MEDICINE & REHABILITATION

## 2019-08-21 PROCEDURE — 82962 GLUCOSE BLOOD TEST: CPT

## 2019-08-21 PROCEDURE — 74011250636 HC RX REV CODE- 250/636: Performed by: PHYSICAL MEDICINE & REHABILITATION

## 2019-08-21 PROCEDURE — 76010000009 HC PAIN MGT 0 TO 30 MIN PROC: Performed by: PHYSICAL MEDICINE & REHABILITATION

## 2019-08-21 RX ORDER — LIDOCAINE HYDROCHLORIDE 10 MG/ML
INJECTION, SOLUTION EPIDURAL; INFILTRATION; INTRACAUDAL; PERINEURAL AS NEEDED
Status: DISCONTINUED | OUTPATIENT
Start: 2019-08-21 | End: 2019-08-21 | Stop reason: HOSPADM

## 2019-08-21 RX ORDER — DEXAMETHASONE SODIUM PHOSPHATE 100 MG/10ML
INJECTION INTRAMUSCULAR; INTRAVENOUS AS NEEDED
Status: DISCONTINUED | OUTPATIENT
Start: 2019-08-21 | End: 2019-08-21 | Stop reason: HOSPADM

## 2019-08-21 RX ORDER — DIAZEPAM 5 MG/1
5-20 TABLET ORAL ONCE
Status: COMPLETED | OUTPATIENT
Start: 2019-08-21 | End: 2019-08-21

## 2019-08-21 RX ADMIN — DIAZEPAM 10 MG: 5 TABLET ORAL at 13:15

## 2019-08-21 NOTE — PROCEDURES
Intralaminar Epidural Steroid Block Procedure Note      Patient Name: Wilmer Russ    Date of Procedure: August 21, 2019    Preoperative Diagnosis: SPINAL STENOSIS OF LUMBAR REGION WITH NEUROGENIC CLAUDICATION  LUMBAR RADICULOPATHY    Post Operative Diagnosis: SPINAL STENOSIS OF LUMBAR REGION WITH NEUROGENIC CLAUDICATION  LUMBAR RADICULOPATHY    Procedure: L3 Epidural Block     PROCEDURE:  Lumbar Epidural Block    Consent:  Informed  Consent was obtained prior to the procedure. The patient was given the opportunity to ask questions regarding the procedure and its associated risks. In addition to the potential risks associated with the procedure itself, the patient was informed both verbally and in writing of potential side effects of the use glucocorticoids. The patient appeared to comprehend the informed consent and desired to have the procedure performed. The patient was placed in the prone position on the fluoroscopy table and the back prepped and draped in the usual sterile manner. The interlaminar space was identified using fluoroscopy and the skin anesthetized with 1% Lidocaine. A #18 Tuohy Epidural needle was advanced under fluoroscopic control from a paramedian approach to the  epidural space as noted above, using the loss of resistance to fluid technique. 1 cc isovue used to confirm position. Then, 30 mg of preservative free Dexamethasone and 1 cc of Lidocaine was slowly injected. The patient tolerated the procedure well. The injection area was cleaned and band aids applied. No excessive bleeding was noted. Patient dressed and was discharged to home with instructions.

## 2019-08-21 NOTE — H&P
Date of Surgery Update:  Sita Brown was seen and examined. History and physical has been reviewed. The patient has been examined. There have been no significant clinical changes since the last office visit.       Signed By: Jasmyn Obrien MD     August 21, 2019 12:52 PM

## 2019-08-21 NOTE — DISCHARGE INSTRUCTIONS
[facility]   ([facility])  Dr. Greg Thomas, Dr. Wes Blue, Dr. Rachelle Emmanuel not drive a car, operate heavy machinery or dangerous equipment for 24 hours. * Activity as tolerated; rest for the remainder of the day. * Resume pre-block medications including those for your family doctor. * Do not drink alcoholic beverages for 24 hours. Alcohol and the medications you have received may interact and cause an adverse reaction. * You may feel better this evening and worse tomorrow, as the numbing medications wears off and the steroid has yet to begin to work. After 48 hrs the steroid should begin to release bringing you relief. * You may shower this evening and remove any bandages. * Avoid hot tubs and heating pads for 24 hours. You may use cold packs on the procedure site as tolerated for the first 24 hours. * If a headache develops, drink plenty of fluids and rest.  Take over the counter medications for headache if needed. If the headache continues longer than 24 hours, call MD at the [address] [place] [address]. [phone]    * Continue taking pain medications as needed. * You may resume your regular diet if tolerated. Otherwise, start with sips of water and advance slowly. * If Diabetic: check your blood sugar three times a day for the next 3 days. If your sugar is greater than 300 call your family doctor. If your sugar is greater than 400, have someone transport you to the nearest Emergency Room. * If you experience any of the following problems, Please Call the [address] [place] [address] at [phone].         * Shortness of Breath    * Fever of 101 or higher    * Nausea / Vomiting    * Severe Headache    * Weakness or numbness in arms or legs that is not      resolving    * Prolonged increase in pain greater than 4 days      DISCHARGE SUMMARY from Nurse      PATIENT INSTRUCTIONS:    After oral sedation, for 24 hours or while taking prescription Narcotics:  · Limit your activities  · Do not drive and operate hazardous machinery  · Do not make important personal or business decisions  · Do  not drink alcoholic beverages  · If you have not urinated within 8 hours after discharge, please contact your surgeon on call. Report the following to your surgeon:  · Excessive pain, swelling, redness or odor of or around the surgical area  · Temperature over 101  · Nausea and vomiting lasting longer than 4 hours or if unable to take medications  · Any signs of decreased circulation or nerve impairment to extremity: change in color, persistent  numbness, tingling, coldness or increase pain  · Any questions            What to do at Home:  Recommended activity: Activity as tolerated, NO DRIVING FOR 12 Hours post injection          *  Please give a list of your current medications to your Primary Care Provider. *  Please update this list whenever your medications are discontinued, doses are      changed, or new medications (including over-the-counter products) are added. *  Please carry medication information at all times in case of emergency situations. These are general instructions for a healthy lifestyle:    No smoking/ No tobacco products/ Avoid exposure to second hand smoke    Surgeon General's Warning:  Quitting smoking now greatly reduces serious risk to your health. Obesity, smoking, and sedentary lifestyle greatly increases your risk for illness    A healthy diet, regular physical exercise & weight monitoring are important for maintaining a healthy lifestyle    You may be retaining fluid if you have a history of heart failure or if you experience any of the following symptoms:  Weight gain of 3 pounds or more overnight or 5 pounds in a week, increased swelling in our hands or feet or shortness of breath while lying flat in bed.   Please call your doctor as soon as you notice any of these symptoms; do not wait until your next office visit. Recognize signs and symptoms of STROKE:    F-face looks uneven    A-arms unable to move or move unevenly    S-speech slurred or non-existent    T-time-call 911 as soon as signs and symptoms begin-DO NOT go       Back to bed or wait to see if you get better-TIME IS BRAIN.

## 2019-09-17 ENCOUNTER — OFFICE VISIT (OUTPATIENT)
Dept: ORTHOPEDIC SURGERY | Age: 59
End: 2019-09-17

## 2019-09-17 VITALS
WEIGHT: 201 LBS | HEIGHT: 67 IN | HEART RATE: 101 BPM | SYSTOLIC BLOOD PRESSURE: 137 MMHG | DIASTOLIC BLOOD PRESSURE: 83 MMHG | TEMPERATURE: 97.7 F | RESPIRATION RATE: 18 BRPM | BODY MASS INDEX: 31.55 KG/M2

## 2019-09-17 DIAGNOSIS — J43.8 OTHER EMPHYSEMA (HCC): ICD-10-CM

## 2019-09-17 DIAGNOSIS — M54.50 LUMBAR SPINE PAIN: ICD-10-CM

## 2019-09-17 DIAGNOSIS — M54.16 LUMBAR RADICULOPATHY: Primary | ICD-10-CM

## 2019-09-17 DIAGNOSIS — M48.062 SPINAL STENOSIS OF LUMBAR REGION WITH NEUROGENIC CLAUDICATION: ICD-10-CM

## 2019-09-17 DIAGNOSIS — M54.2 CERVICAL PAIN: ICD-10-CM

## 2019-09-17 DIAGNOSIS — M47.816 LUMBAR FACET ARTHROPATHY: ICD-10-CM

## 2019-09-17 RX ORDER — MELOXICAM 15 MG/1
15 TABLET ORAL DAILY
Qty: 30 TAB | Refills: 2 | Status: SHIPPED | OUTPATIENT
Start: 2019-09-17 | End: 2020-01-20

## 2019-09-17 NOTE — PROGRESS NOTES
[address]  [address], [address]  [place], [facility] [address]  Phone: [phone]  Fax: [phone]        Dana Damon  : 1960  PCP: Jonah Holley MD  2019    PROGRESS NOTE      HISTORY OF PRESENT ILLNESS  Grecia Iraheta is a 64 y.o. male who was seen as a new patient 19 with c/o low back pain with numbness radiating into his BLE (R>L). Pt notes he has a limited standing tolerance of 8-10 minutes. He finds relief with sitting. However, prolonged sitting or stretching his right leg will exacerbate his pain. He completed PT in March or April off of [facility] in [place]. He has been taking Gabapentin 600 mg TID. He admits to a + shopping cart sign. He states that when his legs go numb, they feel heavy. He notes that he had a lumbar THEO in December (he had 3 injections last year) with improvement for about 4 months. He has also been taking muscle relaxants and Tramadol. He notes that he began treatment with the [place] as he was injured in [place]. He also c/o neck and upper back pain. Grecia Iraheta comes in to the office today for f/u. He had an L3-4 interlaminar injection (19; Dr. Lamont Stout) that provided about 1 day of relief. He was unable to obtain Lyrica due to insurance, so he has been taking Gabapentin and Amitriptyline 50 mg QHS (he was intolerant to 75 mg QHS). Pt notes that he has not had the numbness and tingling in his BLE since he began Amitriptyline. He feels more of a \"crushing down\" in his lumbar region into the top of his buttocks. Pt notes that he has back pain when he rides his riding lawnmower. Lumbar MRI 8/15/19: Mild to moderate central and lateral recess stenosis at L4-5 level due to a combination of diffuse disc bulge, anterolisthesis, and advanced facet arthropathy.  Slight interval worsening of uncovered disc extruding superiorly and mild worsening of moderate to severe bilateral foraminal stenosis, distorting exiting L4 nerve roots bilaterally. Additional stable mild degenerative changes at other mid to lower lumbar levels , as described in detail in Findings section. He rates his pain as a 6/10 today. ASSESSMENT  His symptoms may be due to an L4 radiculopathy related to foraminal stenosis from disc extrusion. It is currently managed with neuromodulators. Lumbar facet arthropathy may also be a pain generator. We discussed the option of a bilateral L4 SNRB vs facet injections. PLAN  1. Bilateral L4 SNRB. 2. Mobic 15 mg daily. 3. We may consider treatment of the facets if he does not find relief from SNRB injections. Pt will f/u in 2 weeks after injection or sooner as needed. Diagnoses and all orders for this visit:    1. Lumbar radiculopathy  -     SCHEDULE SURGERY    2. Lumbar facet arthropathy  -     meloxicam (MOBIC) 15 mg tablet; Take 1 Tab by mouth daily. PAST MEDICAL HISTORY   Past Medical History:   Diagnosis Date    Back pain     [facility] obstructive pulmonary disease ([facility] [address] [facility]     Diabetes ([facility] [facility]     Hypercholesterolemia     Hypertension     Sleep apnea     Does not use CPAP       Past Surgical History:   Procedure Laterality Date    COLONOSCOPY N/A 1/23/2017    COLONOSCOPY performed by Shelia Patterson MD at [place] [address]  2003    umbilacal     HX SHOULDER ARTHROSCOPY  2004    bilateral   .      MEDICATIONS    Current Outpatient Medications   Medication Sig Dispense Refill    amitriptyline (ELAVIL) 25 mg tablet Take 3 Tabs by mouth nightly. 90 Tab 2    ADVAIR DISKUS 250-50 mcg/dose diskus inhaler       carvedilol (COREG) 6.25 mg tablet Take 1 Tab by mouth two (2) times daily (with meals). 180 Tab 1    multivit with min-folic acid 228 mcg TbER Take 1 Cap by mouth daily. 90 Tab 3    methocarbamol (ROBAXIN) 500 mg tablet Take 1 Tab by mouth every six (6) hours as needed (muscle spasm).  90 Tab 1    tiotropium (SPIRIVA WITH HANDIHALER) 18 mcg inhalation capsule Take 1 Cap by inhalation daily. 30 Cap 11    furosemide (LASIX) 40 mg tablet Take 1 Tab by mouth daily. 90 Tab 1    albuterol (PROVENTIL HFA, VENTOLIN HFA, PROAIR HFA) 90 mcg/actuation inhaler Take 2 Puffs by inhalation every four (4) hours as needed for Wheezing or Shortness of Breath. 1 Inhaler 3    cetirizine (ZYRTEC) 10 mg tablet TAKE 1 TABLET BY MOUTH ONCE DAILY 30 Tab 3    aspirin 81 mg chewable tablet Take 1 Tab by mouth daily. 90 Tab 1    losartan (COZAAR) 50 mg tablet Take 1 Tab by mouth daily. 30 Tab 3    gabapentin (NEURONTIN) 300 mg capsule Take 1 Cap by mouth four (4) times daily. (Patient taking differently: Take 300 mg by mouth two (2) times a day.) 120 Cap 3    hydroCHLOROthiazide (HYDRODIURIL) 25 mg tablet Take 1 Tab by mouth daily. 90 Tab 0    pravastatin (PRAVACHOL) 40 mg tablet Take 1.5 Tabs by mouth nightly. Take one-half tablet by mouth at bedtime 90 Tab 1    carboxymethylcellulose sodium (REFRESH TEARS) 0.5 % drop ophthalmic solution Administer 1 Drop to both eyes two (2) times a day. 30 mL 3    clobetasol (TEMOVATE) 0.05 % ointment Apply  to affected area two (2) times a day. 15 g 2    cholecalciferol (VITAMIN D3) 1,000 unit tablet Take 1,000 Units by mouth daily.  omega-3 fatty acids-vitamin e (FISH OIL) 1,000 mg cap Take 1 Cap by mouth two (2) times a day. 60 Cap 3    metFORMIN (GLUCOPHAGE) 1,000 mg tablet Take 1,000 mg by mouth two (2) times daily (with meals).  glipiZIDE (GLUCOTROL) 10 mg tablet Take 10 mg by mouth three (3) times daily.  amLODIPine (NORVASC) 10 mg tablet Take 10 mg by mouth daily.           ALLERGIES  Allergies   Allergen Reactions    Lisinopril Swelling          SOCIAL HISTORY    Social History     Socioeconomic History    Marital status:      Spouse name: Not on file    Number of children: Not on file    Years of education: Not on file    Highest education level: Not on file   Tobacco Use    Smoking status: Current Every Day Smoker     Packs/day: 1.00     Years: 25.00     Pack years: 25.00     Types: Cigarettes     Start date: 4/9/1986    Smokeless tobacco: Former User     Quit date: 3/16/2016   Substance and Sexual Activity    Alcohol use: Yes     Alcohol/week: 4.0 standard drinks     Types: 4 Shots of liquor per week    Drug use: No    Sexual activity: Yes     Partners: Female       FAMILY HISTORY  Family History   Problem Relation Age of Onset    Cancer Mother     Hypertension Mother     Heart Disease Mother     Diabetes Mother     No Known Problems Father          REVIEW OF SYSTEMS  Review of Systems   Musculoskeletal: Positive for back pain. BLE paraesthesia (R>L)           PHYSICAL EXAMINATION  There were no vitals taken for this visit. Pain Assessment  8/8/2019   Location of Pain Back;Neck;Leg   Location Modifiers Right;Left   Severity of Pain 8   Quality of Pain Dull   Duration of Pain Persistent   Frequency of Pain Constant   Date Pain First Started -   Aggravating Factors Walking;Standing   Aggravating Factors Comment sitting, pain is always there, rolling over   Limiting Behavior Some   Relieving Factors Heat;Other (Comment)   Relieving Factors Comment changing postions   Result of Injury Yes   Work-Related Injury No   Type of Injury Other (Comment)   Type of Injury Comment in [place] 2003 in the [facility]           Constitutional:  Well developed, well nourished, in no acute distress. Psychiatric: Affect and mood are appropriate. Integumentary: No rashes or abrasions noted on exposed areas. SPINE/MUSCULOSKELETAL EXAM    Cervical spine:  Neck is midline. Normal muscle tone. No focal atrophy is noted. ROM pain free. Shoulder ROM intact. Tenderness to palpation. Negative Spurling's sign. Negative Tinel's sign. Negative Yanes's sign. Sensation in the bilateral arms grossly intact to light touch.      Lumbar spine:  No rash, ecchymosis, or gross obliquity. No fasciculations. No focal atrophy is noted. No pain with hip ROM. Full range of motion. No tenderness to palpation. No tenderness to palpation at the sciatic notch. SI joints non-tender. Trochanters non tender. Sensation in the bilateral legs grossly intact to light touch. MOTOR:      Biceps  Triceps Deltoids Wrist Ext Wrist Flex Hand Intrin   Right 5/5 5/5 5/5 5/5 5/5 5/5   Left 5/5 5/5 5/5 5/5 5/5 5/5             Hip Flex  Quads Hamstrings Ankle DF EHL Ankle PF   Right 5/5 5/5 5/5 5/5 5/5 5/5   Left 5/5 5/5 5/5 5/5 5/5 5/5     DTRs are No biceps, triceps, brachioradialis, patella, and Achilles.     Negative Straight Leg raise. Squat not tested. No difficulty with tandem gait.      Ambulation without assistive device. FWB. (Pt notes he ambulates with a single point cane,but he left it in his car today)       RADIOGRAPHS  Lumbar MRI images taken on 8/15/19 personally reviewed with patient:  L4-L5 anterior listhesis is present by approximately 4 mm. This listhesis is  quite similar to prior study. Advanced facet arthropathy is seen at this level. Low level T1 hypointense and STIR hyperintense marrow signal is associated with  these facet joints suggesting degenerative inflammatory changes.     No new level subluxation is seen. A pars defect is not seen. Vertebral heights  are maintained.     No suspicious osseous marrow lesion is seen.     The conus medullaris is located at the L1 level and has a normal appearance and  signal.     L1/2 level: There is no central or foraminal stenosis.     L2/3 level: Very mild facet arthropathy is present. There is no central or  foraminal stenosis. This level is unchanged.     L3/4 level: Minimal disc bulge is present. Very mild facet arthropathy is  present. Mild foraminal stenosis is present.  No central stenosis is present.     L4/5 level: Diffuse disc bulge is present uncovered by the anterior listhesis. Small amount uncovered disc slightly extrudes superiorly along the posterior  margin of the L4 vertebral body. The disc bulge is quite similar to prior study,  however the amount of excreted disc has slightly increased. Advanced facet  arthropathy is again seen at this level. Mild amount of epidural fat is also  present contributing to thecal sac narrowing. Mild to moderate central and  lateral recess stenosis is present with estimated midline AP diameter of thecal  sac measuring 7-8 mm. Moderate to severe bilateral foraminal stenosis is  present, with minimal amount of residual foraminal fat, slightly worsened, with  distortion of the exiting L4 nerve roots bilaterally.     L5/S1 level: Minimal disc bulge at least mild facet arthropathy is present. Prominent epidural fat is present with tapering thecal sac , without central  stenosis. Mild to moderate foraminal narrowing is present greater on the left. This level is unchanged.     The visualized retroperitoneum is unremarkable.     _______________________     IMPRESSION  IMPRESSION:     1. Mild to moderate central and lateral recess stenosis at L4-5 level due to a  combination of diffuse disc bulge, anterolisthesis, and advanced facet  arthropathy. Slight interval worsening of uncovered disc extruding superiorly  and mild worsening of moderate to severe bilateral foraminal stenosis,  distorting exiting L4 nerve roots bilaterally.     2. Additional stable mild degenerative changes at other mid to lower lumbar  levels , as described in detail in Findings section. Lumbar XR images taken on 8/8/19 personally reviewed with patient:  Facet sclerosis  Spondylolisthesis - anterolisthesis of L4 on L5  Disc space narrowing at L4-5  No obvious compression fractures.      Lumbar MRI images taken on 3/5/16:  There is mild anterolisthesis of L4 on L5 present.  The vertebral body heights  are maintained throughout the lumbar spine. There is no evidence of fracture  present. No osseous lesions or abnormal signal intensity is present. Intervertebral disc heights are largely maintained, with the exception of L4-L5  which demonstrates mild to moderate intervertebral disc height loss as well as  desiccation. Small annular fissure formation is noted at this level.       Conus medullaris ends at the L1 vertebral body level.     Correlation of axial and sagittal images reveals the following:     L1-L2: No significant disc pathology. No significant proliferative change.  No  central canal or foraminal stenosis.     L2-L3: No significant disc pathology. No significant proliferative change.  No  central canal or foraminal stenosis.     L3-L4: There is a mild diffuse disc bulge, slightly eccentric to the left. No  significant proliferative change.  There is minimal left neuroforaminal  narrowing without right neural foramen or central canal stenosis     L4-L5: There is a moderate and diffuse disc bulge present. This disc bulges  slightly eccentric to the right. . There is moderately severe facet hypertrophy  present bilaterally.  There is moderately severe bilateral neuroforaminal  narrowing, right worse than left, with mild central canal stenosis secondary to  a combination of disc bulge, ligamentum flavum hypertrophy, and epidural  lipomatosis.     L5-S1:  There is a mild and diffuse disc bulge, slightly eccentric to the left. There is mild bilateral facet hypertrophy.  There is moderate left and mild  right neuroforaminal narrowing. Mild central canal narrowing is present, most  related to epidural lipomatosis.     The visualized portions of the sacroiliac joints are unremarkable.  Incidentally  imaged retroperitoneal structures are unremarkable as well.     IMPRESSION:     1. Minimal anterolisthesis of L4 on L5 secondary to moderately severe L4-L5  facet hypertrophy.   2. Moderate and diffuse L4-L5 disc bulge, slightly eccentric to the left. Moderately severe bilateral neuroforaminal narrowing is present at this level  along with mild central canal stenosis. 3. Mild and diffuse L5-S1 disc bulge with mild right and moderate left  neuroforaminal narrowing. 25 minutes of face-to-face contact were spent with the patient during today's visit extensively discussing symptoms and treatment plan. All questions were answered. More than half of this visit today was spent on counseling.      Written by Apollo Sutton as dictated by Fariba Forbes MD

## 2019-10-02 ENCOUNTER — HOSPITAL ENCOUNTER (OUTPATIENT)
Age: 59
Setting detail: OUTPATIENT SURGERY
Discharge: HOME | End: 2019-10-02
Attending: PHYSICAL MEDICINE & REHABILITATION | Admitting: PHYSICAL MEDICINE & REHABILITATION
Payer: MEDICARE

## 2019-10-02 ENCOUNTER — APPOINTMENT (OUTPATIENT)
Dept: GENERAL RADIOLOGY | Age: 59
End: 2019-10-02
Attending: PHYSICAL MEDICINE & REHABILITATION
Payer: MEDICARE

## 2019-10-02 VITALS
RESPIRATION RATE: 20 BRPM | OXYGEN SATURATION: 94 % | SYSTOLIC BLOOD PRESSURE: 136 MMHG | DIASTOLIC BLOOD PRESSURE: 89 MMHG | TEMPERATURE: 98.2 F | HEART RATE: 100 BPM

## 2019-10-02 LAB
GLUCOSE BLD STRIP.AUTO-MCNC: 135 MG/DL (ref 70–110)
GLUCOSE BLD STRIP.AUTO-MCNC: 243 MG/DL (ref 70–110)

## 2019-10-02 PROCEDURE — 74011250637 HC RX REV CODE- 250/637: Performed by: PHYSICAL MEDICINE & REHABILITATION

## 2019-10-02 PROCEDURE — 74011636320 HC RX REV CODE- 636/320: Performed by: PHYSICAL MEDICINE & REHABILITATION

## 2019-10-02 PROCEDURE — 74011250636 HC RX REV CODE- 250/636: Performed by: PHYSICAL MEDICINE & REHABILITATION

## 2019-10-02 PROCEDURE — 74011000250 HC RX REV CODE- 250: Performed by: PHYSICAL MEDICINE & REHABILITATION

## 2019-10-02 PROCEDURE — 77030003676 HC NDL SPN MPRI -A: Performed by: PHYSICAL MEDICINE & REHABILITATION

## 2019-10-02 PROCEDURE — 77030003669 HC NDL SPN COOK -B: Performed by: PHYSICAL MEDICINE & REHABILITATION

## 2019-10-02 PROCEDURE — 82962 GLUCOSE BLOOD TEST: CPT

## 2019-10-02 PROCEDURE — 77030039433 HC TY MYLEOGRAM BD -B: Performed by: PHYSICAL MEDICINE & REHABILITATION

## 2019-10-02 PROCEDURE — 76010000009 HC PAIN MGT 0 TO 30 MIN PROC: Performed by: PHYSICAL MEDICINE & REHABILITATION

## 2019-10-02 RX ORDER — LIDOCAINE HYDROCHLORIDE 10 MG/ML
INJECTION, SOLUTION EPIDURAL; INFILTRATION; INTRACAUDAL; PERINEURAL AS NEEDED
Status: DISCONTINUED | OUTPATIENT
Start: 2019-10-02 | End: 2019-10-02 | Stop reason: HOSPADM

## 2019-10-02 RX ORDER — DEXAMETHASONE SODIUM PHOSPHATE 100 MG/10ML
INJECTION INTRAMUSCULAR; INTRAVENOUS AS NEEDED
Status: DISCONTINUED | OUTPATIENT
Start: 2019-10-02 | End: 2019-10-02 | Stop reason: HOSPADM

## 2019-10-02 RX ORDER — DIAZEPAM 5 MG/1
5-20 TABLET ORAL ONCE
Status: COMPLETED | OUTPATIENT
Start: 2019-10-02 | End: 2019-10-02

## 2019-10-02 RX ADMIN — DIAZEPAM 10 MG: 5 TABLET ORAL at 14:00

## 2019-10-02 NOTE — DISCHARGE INSTRUCTIONS
[facility]   ([facility])  Dr. Adam Harmon, Dr. Chichi Boyce, Dr. Patsy Oates not drive a car, operate heavy machinery or dangerous equipment for 24 hours. * Activity as tolerated; rest for the remainder of the day. * Resume pre-block medications including those for your family doctor. * Do not drink alcoholic beverages for 24 hours. Alcohol and the medications you have received may interact and cause an adverse reaction. * You may feel better this evening and worse tomorrow, as the numbing medications wears off and the steroid has yet to begin to work. After 48 hrs the steroid should begin to release bringing you relief. * You may shower this evening and remove any bandages. * Avoid hot tubs and heating pads for 24 hours. You may use cold packs on the procedure site as tolerated for the first 24 hours. * If a headache develops, drink plenty of fluids and rest.  Take over the counter medications for headache if needed. If the headache continues longer than 24 hours, call MD at the [address] [place] [address]. [phone]    * Continue taking pain medications as needed. * You may resume your regular diet if tolerated. Otherwise, start with sips of water and advance slowly. * If Diabetic: check your blood sugar three times a day for the next 3 days. If your sugar is greater than 300 call your family doctor. If your sugar is greater than 400, have someone transport you to the nearest Emergency Room. * If you experience any of the following problems, Please Call the [address] [place] [address] at [phone].         * Shortness of Breath    * Fever of 101 or higher    * Nausea / Vomiting    * Severe Headache    * Weakness or numbness in arms or legs that is not      resolving    * Prolonged increase in pain greater than 4 days      DISCHARGE SUMMARY from Nurse      PATIENT INSTRUCTIONS:    After oral sedation, for 24 hours or while taking prescription Narcotics:  · Limit your activities  · Do not drive and operate hazardous machinery  · Do not make important personal or business decisions  · Do  not drink alcoholic beverages  · If you have not urinated within 8 hours after discharge, please contact your surgeon on call. Report the following to your surgeon:  · Excessive pain, swelling, redness or odor of or around the surgical area  · Temperature over 101  · Nausea and vomiting lasting longer than 4 hours or if unable to take medications  · Any signs of decreased circulation or nerve impairment to extremity: change in color, persistent  numbness, tingling, coldness or increase pain  · Any questions            What to do at Home:  Recommended activity: Activity as tolerated, NO DRIVING FOR 12 Hours post injection          *  Please give a list of your current medications to your Primary Care Provider. *  Please update this list whenever your medications are discontinued, doses are      changed, or new medications (including over-the-counter products) are added. *  Please carry medication information at all times in case of emergency situations. These are general instructions for a healthy lifestyle:    No smoking/ No tobacco products/ Avoid exposure to second hand smoke    Surgeon General's Warning:  Quitting smoking now greatly reduces serious risk to your health. Obesity, smoking, and sedentary lifestyle greatly increases your risk for illness    A healthy diet, regular physical exercise & weight monitoring are important for maintaining a healthy lifestyle    You may be retaining fluid if you have a history of heart failure or if you experience any of the following symptoms:  Weight gain of 3 pounds or more overnight or 5 pounds in a week, increased swelling in our hands or feet or shortness of breath while lying flat in bed.   Please call your doctor as soon as you notice any of these symptoms; do not wait until your next office visit. Recognize signs and symptoms of STROKE:    F-face looks uneven    A-arms unable to move or move unevenly    S-speech slurred or non-existent    T-time-call 911 as soon as signs and symptoms begin-DO NOT go       Back to bed or wait to see if you get better-TIME IS BRAIN.

## 2019-10-02 NOTE — PROCEDURES
PROCEDURE NOTE      Patient Name: Jagdeep Chapa    Date of Procedure: October 2, 2019    Preoperative Diagnosis:  LUMBAR RADICULOPATHY    Post Operative Diagnosis:  LUMBAR RADICULOPATHY    Procedure :    bilateral  L4 Selective Nerve Root Block    Consent:  Informed consent was obtained prior to the procedure. The patient was given the opportunity to ask questions regarding the procedure and its associated risks. In addition to the potential risks associated with the procedure itself, the patient was informed both verbally and in writing of the potential side effects of the use of glucocorticoid. The patient appeared to comprehend the informed consent and desired to have the procedure performed. Procedure: The patient was placed in the prone position on the fluoroscopy table and the back was prepped and draped in the usual sterile manner. The exact spinal level was  identified using fluoroscopy, and Lidocaine 1 % was injected locally, a # 22 gauge spinal needle was passed to the transverse process. The depth was noted and the needle redirected to pass inferior and approximately one cm anterior to the transverse process. YES  1 cc of Isovue M-200 was used to verify positioning in the epidural and paravertebral space and outlined the course of the spinal nerve into the epidural space. The same procedure was repeated at each spinal level indicated above. A total of 10 mg of preservative free Dexamethasone and 5 cc of Lidocaine was slowly injected. The patient tolerated the procedure well. The injection area was cleaned and bandaids applied. Not excessive bleeding was noted. Patient dressed and discharged to home with instructions. Discussion: The patient tolerated the procedure well.                                               Hector Goff MD  October 2, 2019

## 2019-10-02 NOTE — H&P
Date of Surgery Update:  Jagdeep Chapa was seen and examined. History and physical has been reviewed. The patient has been examined. There have been no significant clinical changes since the last office visit.       Signed By: Hector Goff MD     October 2, 2019 12:48 PM

## 2019-10-17 NOTE — MR AVS SNAPSHOT
Visit Information Date & Time Provider Department Dept. Phone Encounter #  
 12/22/2017  9:00 AM [id number], [address] [facility] [phone] [id number] Follow-up Instructions Return in about 1 month (around 1/22/2018). Upcoming Health Maintenance Date Due  
 EYE EXAM RETINAL OR DILATED Q1 3/8/1972 HEMOGLOBIN A1C Q6M 1/19/2018 FOOT EXAM Q1 3/21/2018 MICROALBUMIN Q1 4/19/2018 MEDICARE YEARLY EXAM 4/20/2018 LIPID PANEL Q1 7/19/2018 COLONOSCOPY 1/23/2022 DTaP/Tdap/Td series (2 - Td) 9/17/2025 Allergies as of 12/22/2017  Review Complete On: 12/22/2017 By: [id number] RIKY Cantu MD  
  
 Severity Noted Reaction Type Reactions Lisinopril  03/01/2016    Swelling Current Immunizations  Never Reviewed Name Date Influenza Vaccine 9/17/2015  9:38 AM  
 Influenza Vaccine (Quad) PF 12/22/2017  9:37 AM, 10/28/2016 Pneumococcal Polysaccharide (PPSV-23) 7/27/2016 Tdap 9/17/2015  9:39 AM  
  
 Not reviewed this visit You Were Diagnosed With   
  
 Codes Comments Type 2 diabetes mellitus without complication, with long-term current use of insulin (HCC)    -  Primary ICD-10-CM: E11.9, Z79.4 ICD-9-CM: 250.00, V58.67 Encounter for immunization     ICD-10-CM: K70 ICD-9-CM: V03.89 Midline low back pain with right-sided sciatica, unspecified chronicity     ICD-10-CM: M54.41 
ICD-9-CM: 218. 3 Chronic midline low back pain without sciatica     ICD-10-CM: M54.5, G89.29 ICD-9-CM: 724.2, 338.29 Vitals BP Pulse Temp Resp Height(growth percentile) Weight(growth percentile) (!) 142/93 87 97.7 °F (36.5 °C) (Oral) 18 5' 9\" (1.753 m) 205 lb (93 kg) SpO2 BMI Smoking Status 90% 30.27 kg/m2 Current Every Day Smoker Vitals History BMI and BSA Data Body Mass Index Body Surface Area  
 30.27 kg/m 2 2.13 m 2 Preferred Pharmacy Pharmacy Name Phone [address] [facility] [address] [phone] Your Updated Medication List  
  
   
This list is accurate as of: 12/22/17  9:46 AM.  Always use your most recent med list.  
  
  
  
  
 albuterol 90 mcg/actuation inhaler Commonly known as:  PROVENTIL HFA, VENTOLIN HFA, PROAIR HFA Take 2 Puffs by inhalation every six (6) hours as needed for Wheezing. amLODIPine 10 mg tablet Commonly known as:  Tiffanie Croon Take 10 mg by mouth daily. aspirin 81 mg chewable tablet Take 1 Tab by mouth daily. carboxymethylcellulose sodium 0.5 % Drop ophthalmic solution Commonly known as:  REFRESH TEARS Administer 1 Drop to both eyes two (2) times a day. cetirizine 10 mg tablet Commonly known as:  ZYRTEC Take 1 Tab by mouth daily. cholecalciferol 1,000 unit tablet Commonly known as:  VITAMIN D3 Take 1,000 Units by mouth daily. clobetasol 0.05 % ointment Commonly known as:  Renzo Blazer Apply  to affected area two (2) times a day. clotrimazole 1 % topical cream  
Commonly known as:  Florance Kaska Apply  to affected area two (2) times a day. fluticasone 50 mcg/actuation nasal spray Commonly known as:  Jannette Serene 2 Sprays by Both Nostrils route daily. fluticasone-salmeterol 250-50 mcg/dose diskus inhaler Commonly known as:  ADVAIR Take 1 Puff by inhalation every twelve (12) hours. gabapentin 300 mg capsule Commonly known as:  NEURONTIN Take 1 Cap by mouth four (4) times daily. glipiZIDE 10 mg tablet Commonly known as:  Mónica Marcos Take 10 mg by mouth three (3) times daily. hydroCHLOROthiazide 25 mg tablet Commonly known as:  HYDRODIURIL Take 1 Tab by mouth daily. losartan 50 mg tablet Commonly known as:  COZAAR Take  by mouth daily. metFORMIN 1,000 mg tablet Commonly known as:  GLUCOPHAGE Take 1,000 mg by mouth two (2) times daily (with meals). omega-3 fatty acids-vitamin e 1,000 mg Cap Commonly known as:  FISH OIL Take 1 Cap by mouth two (2) times a day. polyethylene glycol 17 gram/dose powder Commonly known as:  Ejsus Sport Take 17 g by mouth daily. pravastatin 40 mg tablet Commonly known as:  PRAVACHOL Take 1.5 Tabs by mouth nightly. Take one-half tablet by mouth at bedtime  
  
 predniSONE 10 mg tablet Commonly known as:  DELTASONE  
4 per day x 3 days, then 3 per day x 3 days, then 2 per day x 3 days, then 1 per day x 3 days, then DC  
  
 propranolol  mg SR capsule Commonly known as:  INDERAL LA Take 120 mg by mouth daily. PROTONIX 40 mg granules for oral suspension Generic drug:  pantoprazole 40 mg daily. traMADol 50 mg tablet Commonly known as:  ULTRAM  
Take 1 Tab by mouth every eight (8) hours as needed for Pain. Max Daily Amount: 150 mg.  
  
  
  
  
Prescriptions Printed Refills  
 traMADol (ULTRAM) 50 mg tablet 0 Sig: Take 1 Tab by mouth every eight (8) hours as needed for Pain. Max Daily Amount: 150 mg.  
 Class: Print Route: Oral  
  
We Performed the Following ADMIN INFLUENZA VIRUS VAC [ [facility]] INFLUENZA VIRUS VAC QUAD,SPLIT,PRESV FREE SYRINGE IM [id number] CPT(R)] Follow-up Instructions Return in about 1 month (around 1/22/2018). To-Do List   
 12/22/2017 Lab:  CBC WITH AUTOMATED DIFF   
  
 12/22/2017 Lab:  HEMOGLOBIN A1C WITH EAG   
  
 12/22/2017 Lab:  LIPID PANEL   
  
 12/22/2017 Lab:  METABOLIC PANEL, COMPREHENSIVE   
  
 12/22/2017 Lab:  [facility] [address] [address] (MW)   
  
  
Introducing [facility]! Dear Samantha Blanco: Thank you for requesting a [employer] account. Our records indicate that you already have an active [employer] account. You can access your account anytime at [email or web address] [email or web address] Did you know that you can access your hospital and ER discharge instructions at any time in PayPlug? You can also review all of your test results from your hospital stay or ER visit. Additional Information If you have questions, please visit the Frequently Asked Questions section of the PayPlug website at [email or web address] Remember, PayPlug is NOT to be used for urgent needs. For medical emergencies, dial 911. Now available from your iPhone and Android! Please provide this summary of care documentation to your next provider. Your primary care clinician is listed as Siobhan Pink. If you have any questions after today's visit, please call [phone]. room air

## 2019-10-22 ENCOUNTER — OFFICE VISIT (OUTPATIENT)
Dept: FAMILY MEDICINE CLINIC | Age: 59
End: 2019-10-22

## 2019-10-22 VITALS
TEMPERATURE: 95.2 F | HEART RATE: 76 BPM | BODY MASS INDEX: 31.71 KG/M2 | WEIGHT: 202 LBS | SYSTOLIC BLOOD PRESSURE: 131 MMHG | RESPIRATION RATE: 18 BRPM | DIASTOLIC BLOOD PRESSURE: 86 MMHG | HEIGHT: 67 IN | OXYGEN SATURATION: 99 %

## 2019-10-22 DIAGNOSIS — I10 ESSENTIAL HYPERTENSION: ICD-10-CM

## 2019-10-22 DIAGNOSIS — I42.9 CARDIOMYOPATHY, UNSPECIFIED TYPE (HCC): ICD-10-CM

## 2019-10-22 DIAGNOSIS — G89.29 CHRONIC MIDLINE LOW BACK PAIN WITHOUT SCIATICA: ICD-10-CM

## 2019-10-22 DIAGNOSIS — J30.2 OTHER SEASONAL ALLERGIC RHINITIS: ICD-10-CM

## 2019-10-22 DIAGNOSIS — M54.50 CHRONIC MIDLINE LOW BACK PAIN WITHOUT SCIATICA: ICD-10-CM

## 2019-10-22 DIAGNOSIS — J43.9 PULMONARY EMPHYSEMA, UNSPECIFIED EMPHYSEMA TYPE (HCC): ICD-10-CM

## 2019-10-22 DIAGNOSIS — M79.89 LEG SWELLING: ICD-10-CM

## 2019-10-22 DIAGNOSIS — F17.210 CIGARETTE NICOTINE DEPENDENCE WITHOUT COMPLICATION: ICD-10-CM

## 2019-10-22 DIAGNOSIS — E11.40 TYPE 2 DIABETES MELLITUS WITH DIABETIC NEUROPATHY, WITHOUT LONG-TERM CURRENT USE OF INSULIN (HCC): Primary | ICD-10-CM

## 2019-10-22 DIAGNOSIS — E11.21 TYPE 2 DIABETES MELLITUS WITH NEPHROPATHY (HCC): ICD-10-CM

## 2019-10-22 DIAGNOSIS — Z12.5 SCREENING PSA (PROSTATE SPECIFIC ANTIGEN): ICD-10-CM

## 2019-10-22 DIAGNOSIS — E78.00 HYPERCHOLESTEREMIA: ICD-10-CM

## 2019-10-22 PROBLEM — M51.36 DEGENERATION OF INTERVERTEBRAL DISC OF LUMBAR REGION: Status: ACTIVE | Noted: 2019-10-22

## 2019-10-22 RX ORDER — FUROSEMIDE 40 MG/1
40 TABLET ORAL DAILY
Qty: 90 TAB | Refills: 3 | Status: SHIPPED | OUTPATIENT
Start: 2019-10-22 | End: 2019-11-20 | Stop reason: SDUPTHER

## 2019-10-22 RX ORDER — CETIRIZINE HCL 10 MG
TABLET ORAL
Qty: 90 TAB | Refills: 3 | Status: SHIPPED | OUTPATIENT
Start: 2019-10-22 | End: 2020-11-02

## 2019-10-22 RX ORDER — CARVEDILOL 6.25 MG/1
6.25 TABLET ORAL 2 TIMES DAILY WITH MEALS
Qty: 180 TAB | Refills: 3 | Status: SHIPPED | OUTPATIENT
Start: 2019-10-22 | End: 2020-11-02

## 2019-10-22 NOTE — PROGRESS NOTES
Chief Complaint   Patient presents with    LOW BACK PAIN    Diabetes    Cholesterol Problem     1. Have you been to the ER, urgent care clinic since your last visit? Hospitalized since your last visit? No    2. Have you seen or consulted any other health care providers outside of the [address] [place] [address] since your last visit? Include any pap smears or colon screening.  No

## 2019-10-22 NOTE — PROGRESS NOTES
History of Present Illness  Lita López is a 64 y.o. male who presents today for management of    Chief Complaint   Patient presents with    LOW BACK PAIN    Diabetes    Cholesterol Problem       Patient complains of chronic low back pain. It is mildly worse today. He received 2 epidural steroid injections which was effective for about 2 months. He has been taking muscle relaxants and amitriptyline. Diabetes Mellitus:  He has diabetes mellitus, and  hypertension and hyperlipidemia. Diabetic ROS - medication compliance: compliant all of the time, diabetic diet compliance: compliant most of the time, home glucose monitoring: is performed sporadically, further diabetic ROS: no polyuria or polydipsia, no chest pain, dyspnea or TIA's, no numbness, tingling or pain in extremities. Lab review: orders written for new lab studies as appropriate; see orders.      Cardiovascular Review:  The patient has hypertension, hyperlipidemia and cardiomyopathy. Diet and Lifestyle: generally follows a low fat low cholesterol diet  Home BP Monitoring: is not measured at home. Pertinent ROS: taking medications as instructed.  Patient has not been taking carvedilol, has been on propranolol prescribed by the [place], no medication side effects noted, no TIA's, no chest pain on exertion, notes stable dyspnea on exertion, no change, noting swelling of ankles.     Problem List  Patient Active Problem List    Diagnosis Date Noted    Degeneration of intervertebral disc of lumbar region 10/22/2019    Spinal stenosis of lumbar region 06/25/2019    Cardiomyopathy ([address] 04/23/2019    Other proteinuria 03/07/2019    Type 2 diabetes mellitus with diabetic neuropathy ([address] 02/09/2018    Type 2 diabetes mellitus with nephropathy ([address] 12/22/2017    Clubbing of nail 03/23/2016    Chronic obstructive pulmonary disease ([address] 03/23/2016    JAIMEE (obstructive sleep apnea) 09/17/2015    Midline low back pain without sciatica 09/03/2015  Essential hypertension 09/03/2015    Hypercholesteremia 09/03/2015    Hip pain 09/03/2015       Past Medical History  Past Medical History:   Diagnosis Date    Back pain     [facility]     Diabetes ([facility]     Hypercholesterolemia     Hypertension     Sleep apnea     Does not use CPAP        Surgical History  Past Surgical History:   Procedure Laterality Date    COLONOSCOPY N/A 1/23/2017    COLONOSCOPY performed by Cammy Linares MD at [address] [place] [address]  2003    umbilacal     HX SHOULDER ARTHROSCOPY  2004    bilateral        Current Medications  Current Outpatient Medications   Medication Sig    furosemide (LASIX) 40 mg tablet Take 1 Tab by mouth daily.  cetirizine (ZYRTEC) 10 mg tablet TAKE 1 TABLET BY MOUTH ONCE DAILY    carvedilol (COREG) 6.25 mg tablet Take 1 Tab by mouth two (2) times daily (with meals).  meloxicam (MOBIC) 15 mg tablet Take 1 Tab by mouth daily.  amitriptyline (ELAVIL) 25 mg tablet Take 3 Tabs by mouth nightly.  ADVAIR DISKUS 250-50 mcg/dose diskus inhaler     multivit with min-folic acid 229 mcg TbER Take 1 Cap by mouth daily.  methocarbamol (ROBAXIN) 500 mg tablet Take 1 Tab by mouth every six (6) hours as needed (muscle spasm).  tiotropium (SPIRIVA WITH HANDIHALER) 18 mcg inhalation capsule Take 1 Cap by inhalation daily.  albuterol (PROVENTIL HFA, VENTOLIN HFA, PROAIR HFA) 90 mcg/actuation inhaler Take 2 Puffs by inhalation every four (4) hours as needed for Wheezing or Shortness of Breath.  aspirin 81 mg chewable tablet Take 1 Tab by mouth daily.  losartan (COZAAR) 50 mg tablet Take 1 Tab by mouth daily.  gabapentin (NEURONTIN) 300 mg capsule Take 1 Cap by mouth four (4) times daily. (Patient taking differently: Take 300 mg by mouth two (2) times a day.)    hydroCHLOROthiazide (HYDRODIURIL) 25 mg tablet Take 1 Tab by mouth daily.     pravastatin (PRAVACHOL) 40 mg tablet Take 1.5 Tabs by mouth nightly. Take one-half tablet by mouth at bedtime    carboxymethylcellulose sodium (REFRESH TEARS) 0.5 % drop ophthalmic solution Administer 1 Drop to both eyes two (2) times a day.  clobetasol (TEMOVATE) 0.05 % ointment Apply  to affected area two (2) times a day.  cholecalciferol (VITAMIN D3) 1,000 unit tablet Take 1,000 Units by mouth daily.  omega-3 fatty acids-vitamin e (FISH OIL) 1,000 mg cap Take 1 Cap by mouth two (2) times a day.  metFORMIN (GLUCOPHAGE) 1,000 mg tablet Take 1,000 mg by mouth two (2) times daily (with meals).  glipiZIDE (GLUCOTROL) 10 mg tablet Take 10 mg by mouth three (3) times daily.  amLODIPine (NORVASC) 10 mg tablet Take 10 mg by mouth daily. No current facility-administered medications for this visit. Allergies/Drug Reactions  Allergies   Allergen Reactions    Lisinopril Swelling        Family History  Family History   Problem Relation Age of Onset   [facility] Cancer Mother     Hypertension Mother     Heart Disease Mother     Diabetes Mother     No Known Problems Father         Social History  Social History     Socioeconomic History    Marital status:      Spouse name: Not on file    Number of children: Not on file    Years of education: Not on file    Highest education level: Not on file   Occupational History    Not on file   Social Needs    Financial resource strain: Not on file    Food insecurity:     Worry: Not on file     Inability: Not on file    Transportation needs:     Medical: Not on file     Non-medical: Not on file   Tobacco Use    Smoking status: Current Every Day Smoker     Packs/day: 1.00     Years: 25.00     Pack years: 25.00     Types: Cigarettes     Start date: 4/9/1986    Smokeless tobacco: Former User     Quit date: 3/16/2016   Substance and Sexual Activity    Alcohol use:  Yes     Alcohol/week: 4.0 standard drinks     Types: 4 Shots of liquor per week    Drug use: No    Sexual activity: Yes     Partners: Female Lifestyle    Physical activity:     Days per week: Not on file     Minutes per session: Not on file    Stress: Not on file   Relationships    Social connections:     Talks on phone: Not on file     Gets together: Not on file     Attends Orthodoxy service: Not on file     Active member of club or organization: Not on file     Attends meetings of clubs or organizations: Not on file     Relationship status: Not on file    Intimate partner violence:     Fear of current or ex partner: Not on file     Emotionally abused: Not on file     Physically abused: Not on file     Forced sexual activity: Not on file   Other Topics Concern    Not on file   Social History Narrative    Not on file       Review of Systems  Negative except as mentioned in HPI      Physical Exam  Vital signs:   Vitals:    10/22/19 1041   BP: 131/86   Pulse: 76   Resp: 18   Temp: 95.2 °F (35.1 °C)   TempSrc: Oral   SpO2: 99%   Weight: 202 lb (91.6 kg)   Height: 5' 7\" (1.702 m)       General: alert, oriented, not in distress  Eyes: clear conjunctivae, anicteric sclerae, full and equal ROMs  Chest/Lungs: clear breath sounds, no wheezing or crackles  Heart: normal rate, regular rhythm, no murmur  Abdomen: soft, non-distended, non-tender, normal bowel sounds, no organomegaly, no masses  Extremities: no focal deformities, no edema  Neuro: AAOx3, CN's grossly intact  Skin: no visible abnormalities      Laboratory/Tests:  Component      Latest Ref Rng & Units 6/25/2019 6/25/2019 1/22/2019 10/19/2018          10:24 AM 10:23 AM 11:16 AM 11:53 AM   Sodium      136 - 145 mmol/L  138  136   Potassium      3.5 - 5.5 mmol/L  4.0  4.0   Chloride      100 - 108 mmol/L  102  97 (L)   CO2      21 - 32 mmol/L  31  29   Anion gap      3.0 - 18 mmol/L  5  10   Glucose      74 - 99 mg/dL  161 (H)  137 (H)   BUN      7.0 - 18 MG/DL  19 (H)  12   Creatinine      0.6 - 1.3 MG/DL  1.22  0.92   BUN/Creatinine ratio      12 - 20    16  13   GFR est AA      >60 ml/min/1.73m2 >60  >60   GFR est non-AA      >60 ml/min/1.73m2  >60  >60   Calcium      8.5 - 10.1 MG/DL  9.5  8.6   Cholesterol, total      <200 MG/DL   194    Triglyceride      <150 MG/DL   194 (H)    HDL Cholesterol      40 - 60 MG/DL   46    LDL, calculated      0 - 100 MG/DL   109.2 (H)    VLDL, calculated      MG/DL   38.8    CHOL/HDL Ratio      0 - 5.0     4.2    Hemoglobin A1c (POC)      % 6.7        Component      Latest Ref Rng & Units 9/11/2018 9/11/2018           2:53 PM  2:53 PM   WBC      4.6 - 13.2 K/uL  8.5   RBC      4.70 - 5.50 M/uL  5.15   HGB      13.0 - 16.0 g/dL  14.7   HCT      36.0 - 48.0 %  46.9   MCV      74.0 - 97.0 FL  91.1   MCH      24.0 - 34.0 PG  28.5   MCHC      31.0 - 37.0 g/dL  31.3   RDW      11.6 - 14.5 %  16.0 (H)   PLATELET      706 - 440 K/uL  292   MPV      9.2 - 11.8 FL  11.7   NEUTROPHILS      40 - 73 %  45   LYMPHOCYTES      21 - 52 %  44   MONOCYTES      3 - 10 %  8   EOSINOPHILS      0 - 5 %  2   BASOPHILS      0 - 2 %  1   ABS. NEUTROPHILS      1.8 - 8.0 K/UL  3.8   ABS. LYMPHOCYTES      0.9 - 3.6 K/UL  3.7 (H)   ABS. MONOCYTES      0.05 - 1.2 K/UL  0.7   ABS. EOSINOPHILS      0.0 - 0.4 K/UL  0.2   ABS. BASOPHILS      0.0 - 0.1 K/UL  0.0   DF        AUTOMATED   Sodium      136 - 145 mmol/L 141    Potassium      3.5 - 5.5 mmol/L 3.9    Chloride      100 - 108 mmol/L 101    CO2      21 - 32 mmol/L 31    Anion gap      3.0 - 18 mmol/L 9    Glucose      74 - 99 mg/dL 147 (H)    BUN      7.0 - 18 MG/DL 15    Creatinine      0.6 - 1.3 MG/DL 1.50 (H)    BUN/Creatinine ratio      12 - 20   10 (L)    GFR est AA      >60 ml/min/1.73m2 59 (L)    GFR est non-AA      >60 ml/min/1.73m2 48 (L)    Calcium      8.5 - 10.1 MG/DL 8.8    Bilirubin, total      0.2 - 1.0 MG/DL 0.3    ALT (SGPT)      16 - 61 U/L 15 (L)    AST      15 - 37 U/L 15    Alk.  phosphatase      45 - 117 U/L 79    Protein, total      6.4 - 8.2 g/dL 7.5    Albumin      3.4 - 5.0 g/dL 3.3 (L)    Globulin      2.0 - 4.0 g/dL 4.2 (H) A-G Ratio      0.8 - 1.7   0.8        Assessment/Plan:    1. Leg swelling  - improved  - furosemide (LASIX) 40 mg tablet; Take 1 Tab by mouth daily. Dispense: 90 Tab; Refill: 3    2. Other seasonal allergic rhinitis  - cetirizine (ZYRTEC) 10 mg tablet; TAKE 1 TABLET BY MOUTH ONCE DAILY  Dispense: 90 Tab; Refill: 3    3. Essential hypertension  - controlled  - continue coreg, amlodipine and HCTZ  - carvedilol (COREG) 6.25 mg tablet; Take 1 Tab by mouth two (2) times daily (with meals). Dispense: 180 Tab; Refill: 3  - METABOLIC PANEL, COMPREHENSIVE; Future  - URINALYSIS W/ RFLX MICROSCOPIC; Future    4. Cardiomyopathy, unspecified type ([facility]  - EF 46-50%  - no signs of acute decompensation  - carvedilol (COREG) 6.25 mg tablet; Take 1 Tab by mouth two (2) times daily (with meals). Dispense: 180 Tab; Refill: 3  - METABOLIC PANEL, COMPREHENSIVE; Future    5. Type 2 diabetes mellitus with diabetic neuropathy, without long-term current use of insulin (HCC)  - controlled  - CBC WITH AUTOMATED DIFF; Future  - HEMOGLOBIN A1C WITH EAG; Future  - METABOLIC PANEL, COMPREHENSIVE; Future  - MICROALBUMIN, UR, RAND W/ MICROALB/CREAT RATIO; Future    6. Type 2 diabetes mellitus with nephropathy (HCC)  - stable  - CBC WITH AUTOMATED DIFF; Future  - HEMOGLOBIN A1C WITH EAG; Future  - METABOLIC PANEL, COMPREHENSIVE; Future  - MICROALBUMIN, UR, RAND W/ MICROALB/CREAT RATIO; Future    7. Pulmonary emphysema, unspecified emphysema type ([facility]  - stable  - continue current meds    8. Chronic midline low back pain without sciatica  - improved  - on Robaxin, gabapentin and amitriptyline  - ortho follow-up    9. Hypercholesteremia  - continue pravastatin  - LIPID PANEL; Future  - METABOLIC PANEL, COMPREHENSIVE; Future    10. Screening PSA (prostate specific antigen)  - PSA SCREENING (SCREENING); Future    11.  Cigarette nicotine dependence without complication  - smoking cessation counseling  - will do low-dose CT in the future for lung cancer screening        Follow-up and Dispositions    · Return in about 3 months (around 1/22/2020) for DM, HTN, HLD. I have discussed the diagnosis with the patient and the intended plan as seen in the above orders. The patient has received an after-visit summary and questions were answered concerning future plans. I have discussed medication side effects and warnings with the patient as well. I have reviewed the plan of care with the patient, accepted their input and they are in agreement with the treatment goals.        Lacy Davidson MD  October 22, 2019

## 2019-11-15 ENCOUNTER — OFFICE VISIT (OUTPATIENT)
Dept: FAMILY MEDICINE CLINIC | Age: 59
End: 2019-11-15

## 2019-11-15 VITALS
RESPIRATION RATE: 18 BRPM | OXYGEN SATURATION: 77 % | WEIGHT: 212 LBS | SYSTOLIC BLOOD PRESSURE: 111 MMHG | BODY MASS INDEX: 33.27 KG/M2 | HEIGHT: 67 IN | DIASTOLIC BLOOD PRESSURE: 75 MMHG | HEART RATE: 115 BPM | TEMPERATURE: 96.7 F

## 2019-11-15 DIAGNOSIS — J43.9 PULMONARY EMPHYSEMA, UNSPECIFIED EMPHYSEMA TYPE (HCC): Primary | ICD-10-CM

## 2019-11-15 DIAGNOSIS — F17.210 CIGARETTE NICOTINE DEPENDENCE WITHOUT COMPLICATION: ICD-10-CM

## 2019-11-15 RX ORDER — ALBUTEROL SULFATE 90 UG/1
2 AEROSOL, METERED RESPIRATORY (INHALATION)
Qty: 1 INHALER | Refills: 3 | Status: SHIPPED | OUTPATIENT
Start: 2019-11-15

## 2019-11-15 NOTE — PROGRESS NOTES
History of Present Illness  Xander Park is a 64 y.o. male who presents today for management of    Chief Complaint   Patient presents with    Breathing Problem    Dizziness     when he stands up and walk, pt becomes dizzy     Shaking     pt noticed last year, he has not control sometime he gets tremors        Patient had right ankle fracture one week ago. COPD Review:  The patient is being seen for follow up of COPD. Oxygen: He currently is not on home oxygen therapy. Symptoms: becomes dyspneic after 0 blocks  can climb 1 flights of stairs  wheezing. Patient does smoke cigarettes.       Problem List  Patient Active Problem List    Diagnosis Date Noted    Degeneration of intervertebral disc of lumbar region 10/22/2019    Cigarette nicotine dependence without complication 78/74/9084    Spinal stenosis of lumbar region 06/25/2019    Cardiomyopathy ([facility] 04/23/2019    Other proteinuria 03/07/2019    Type 2 diabetes mellitus with diabetic neuropathy ([address] 02/09/2018    Type 2 diabetes mellitus with nephropathy ([facility] [address] 12/22/2017    Clubbing of nail 03/23/2016    Chronic obstructive pulmonary disease ([address] 03/23/2016    JAIMEE (obstructive sleep apnea) 09/17/2015    Midline low back pain without sciatica 09/03/2015    Essential hypertension 09/03/2015    Hypercholesteremia 09/03/2015    Hip pain 09/03/2015       Past Medical History  Past Medical History:   Diagnosis Date    Back pain     Chronic obstructive pulmonary disease ([address]     Diabetes ([address]     Hypercholesterolemia     Hypertension     Sleep apnea     Does not use CPAP        Surgical History  Past Surgical History:   Procedure Laterality Date    COLONOSCOPY N/A 1/23/2017    COLONOSCOPY performed by Paige Bonilla MD at [address] [place] [address]  2003    umbilacal     HX SHOULDER ARTHROSCOPY  2004    bilateral        Current Medications  Current Outpatient Medications   Medication Sig    fluticasone-umeclidinium-vilanterol (TRELEGY ELLIPTA) 100-62.5-25 mcg inhaler Take 1 Puff by inhalation daily.  albuterol (PROVENTIL HFA, VENTOLIN HFA, PROAIR HFA) 90 mcg/actuation inhaler Take 2 Puffs by inhalation every four (4) hours as needed for Wheezing or Shortness of Breath.  furosemide (LASIX) 40 mg tablet Take 1 Tab by mouth daily.  cetirizine (ZYRTEC) 10 mg tablet TAKE 1 TABLET BY MOUTH ONCE DAILY    carvedilol (COREG) 6.25 mg tablet Take 1 Tab by mouth two (2) times daily (with meals).  meloxicam (MOBIC) 15 mg tablet Take 1 Tab by mouth daily.  amitriptyline (ELAVIL) 25 mg tablet Take 3 Tabs by mouth nightly.  multivit with min-folic acid 274 mcg TbER Take 1 Cap by mouth daily.  methocarbamol (ROBAXIN) 500 mg tablet Take 1 Tab by mouth every six (6) hours as needed (muscle spasm).  aspirin 81 mg chewable tablet Take 1 Tab by mouth daily.  losartan (COZAAR) 50 mg tablet Take 1 Tab by mouth daily.  gabapentin (NEURONTIN) 300 mg capsule Take 1 Cap by mouth four (4) times daily. (Patient taking differently: Take 300 mg by mouth two (2) times a day.)    hydroCHLOROthiazide (HYDRODIURIL) 25 mg tablet Take 1 Tab by mouth daily.  pravastatin (PRAVACHOL) 40 mg tablet Take 1.5 Tabs by mouth nightly. Take one-half tablet by mouth at bedtime    carboxymethylcellulose sodium (REFRESH TEARS) 0.5 % drop ophthalmic solution Administer 1 Drop to both eyes two (2) times a day.  clobetasol (TEMOVATE) 0.05 % ointment Apply  to affected area two (2) times a day.  cholecalciferol (VITAMIN D3) 1,000 unit tablet Take 1,000 Units by mouth daily.  omega-3 fatty acids-vitamin e (FISH OIL) 1,000 mg cap Take 1 Cap by mouth two (2) times a day.  metFORMIN (GLUCOPHAGE) 1,000 mg tablet Take 1,000 mg by mouth two (2) times daily (with meals).  glipiZIDE (GLUCOTROL) 10 mg tablet Take 10 mg by mouth three (3) times daily.  amLODIPine (NORVASC) 10 mg tablet Take 10 mg by mouth daily. No current facility-administered medications for this visit. Allergies/Drug Reactions  Allergies   Allergen Reactions    Lisinopril Swelling        Family History  Family History   Problem Relation Age of Onset   [facility] Cancer Mother     Hypertension Mother     Heart Disease Mother     Diabetes Mother     No Known Problems Father         Social History  Social History     Socioeconomic History    Marital status:      Spouse name: Not on file    Number of children: Not on file    Years of education: Not on file    Highest education level: Not on file   Occupational History    Not on file   Social Needs    Financial resource strain: Not on file    Food insecurity:     Worry: Not on file     Inability: Not on file    Transportation needs:     Medical: Not on file     Non-medical: Not on file   Tobacco Use    Smoking status: Current Every Day Smoker     Packs/day: 1.00     Years: 25.00     Pack years: 25.00     Types: Cigarettes     Start date: 4/9/1986    Smokeless tobacco: Former User     Quit date: 3/16/2016   Substance and Sexual Activity    Alcohol use:  Yes     Alcohol/week: 4.0 standard drinks     Types: 4 Shots of liquor per week    Drug use: No    Sexual activity: Yes     Partners: Female   Lifestyle    Physical activity:     Days per week: Not on file     Minutes per session: Not on file    Stress: Not on file   Relationships    Social connections:     Talks on phone: Not on file     Gets together: Not on file     Attends Jewish service: Not on file     Active member of club or organization: Not on file     Attends meetings of clubs or organizations: Not on file     Relationship status: Not on file    Intimate partner violence:     Fear of current or ex partner: Not on file     Emotionally abused: Not on file     Physically abused: Not on file     Forced sexual activity: Not on file   Other Topics Concern    Not on file   Social History Narrative    Not on file Review of Systems  Negative except as mentioned in HPI      Physical Exam  Vital signs:   Vitals:    11/15/19 1509   BP: 111/75   Pulse: (!) 115   Resp: 18   Temp: 96.7 °F (35.9 °C)   TempSrc: Oral   SpO2: (!) 77%   Weight: 212 lb (96.2 kg)   Height: 5' 7\" (1.702 m)       General: alert, oriented, not in distress  Eyes: clear conjunctivae, anicteric sclerae, full and equal ROMs  Chest/Lungs: clear breath sounds, no wheezing or crackles  Heart: normal rate, regular rhythm, no murmur  Extremities: no focal deformities, no edema  Neuro: AAOx3, CN's grossly intact  Skin: no visible abnormalities    Laboratory/Tests:  CT CHEST 4/2019   LUNGS:  Parenchyma:  Mild scattered relatively symmetric subpleural reticular lines and/or haziness,  mostly in the lower lung zones posteriorly bilaterally, nonspecific, but fairly  typical of dependent partial atelectasis and/or scarring. Minimal similar  bandlike partial atelectasis or scarring right middle lobe and lingula.     There is no evident mass, consolidation, ground glass opacity, or diffuse or  nodular interstitial thickening.     No evident bronchiectasis.     PLEURA:  No evident pleural effusion,   pneumothorax,  or pleural based  mass.     AIRWAYS:  Central airways are patent.     MEDIASTINUM:  There is mild aortic/coronary atherosclerosis. Great vessels are  of normal  caliber. Mild cardiomegaly. No pericardial effusion.     LYMPH NODES:  Mild mediastinal lymphadenopathy with numerous scattered mostly normal sized  bilateral hilar and mediastinal lymph nodes, with single enlarged subcarinal  lymph node and probable small bilateral hilar lymph nodes.     UPPER ABDOMEN: Minimal spondylosis. Old healed right ninth, 10th and 11th rib  fractures.     BONES/OSSEOUS: No acute or aggressive abnormalities identified.        MISC:  Chest wall is unremarkable. IMPRESSION:  1. No prior recent exams.  Findings consistent with mild mostly bibasilar partial atelectasis or multifocal scarring. No evidence of diffuse interstitial lung disease or bronchiectasis. Cardiomegaly      Assessment/Plan:    1. Pulmonary emphysema, unspecified emphysema type (HCC)  - AMB POC SPIROMETRY - severe airway obstruction  - stop Advair and Spiriva - change to Trelegy  - fluticasone-umeclidinium-vilanterol (TRELEGY ELLIPTA) 100-62.5-25 mcg inhaler; Take 1 Puff by inhalation daily. Dispense: 1 Inhaler; Refill: 2  - albuterol (PROVENTIL HFA, VENTOLIN HFA, PROAIR HFA) 90 mcg/actuation inhaler; Take 2 Puffs by inhalation every four (4) hours as needed for Wheezing or Shortness of Breath. Dispense: 1 Inhaler; Refill: 3  - REFERRAL TO PULMONARY DISEASE    2. Cigarette nicotine dependence without complication  - action stage with respect to tobacco use. I advised patient to quit, and offered support. Follow-up and Dispositions    · Return for ROV. I have discussed the diagnosis with the patient and the intended plan as seen in the above orders. The patient has received an after-visit summary and questions were answered concerning future plans. I have discussed medication side effects and warnings with the patient as well. I have reviewed the plan of care with the patient, accepted their input and they are in agreement with the treatment goals.        Anny Kaye MD  November 15, 2019

## 2019-11-20 DIAGNOSIS — M79.89 LEG SWELLING: ICD-10-CM

## 2019-11-20 NOTE — TELEPHONE ENCOUNTER
Requested Prescriptions     Pending Prescriptions Disp Refills    furosemide (LASIX) 40 mg tablet 90 Tab 3     Sig: Take 1 Tab by mouth daily.

## 2019-11-21 RX ORDER — FUROSEMIDE 40 MG/1
40 TABLET ORAL DAILY
Qty: 90 TAB | Refills: 3 | Status: SHIPPED | OUTPATIENT
Start: 2019-11-21 | End: 2020-08-13 | Stop reason: DRUGHIGH

## 2019-12-31 ENCOUNTER — HOSPITAL ENCOUNTER (OUTPATIENT)
Dept: LAB | Age: 59
Discharge: HOME | End: 2019-12-31
Payer: MEDICARE

## 2019-12-31 LAB
BASOPHILS # BLD: 0 K/UL (ref 0–0.1)
BASOPHILS NFR BLD: 0 % (ref 0–2)
DIFFERENTIAL METHOD BLD: ABNORMAL
EOSINOPHIL # BLD: 0.2 K/UL (ref 0–0.4)
EOSINOPHIL NFR BLD: 2 % (ref 0–5)
ERYTHROCYTE [DISTWIDTH] IN BLOOD BY AUTOMATED COUNT: 16.3 % (ref 11.6–14.5)
HCT VFR BLD AUTO: 49.5 % (ref 36–48)
HGB BLD-MCNC: 15.6 G/DL (ref 13–16)
LYMPHOCYTES # BLD: 3.1 K/UL (ref 0.9–3.6)
LYMPHOCYTES NFR BLD: 33 % (ref 21–52)
MCH RBC QN AUTO: 29.5 PG (ref 24–34)
MCHC RBC AUTO-ENTMCNC: 31.5 G/DL (ref 31–37)
MCV RBC AUTO: 93.8 FL (ref 74–97)
MONOCYTES # BLD: 0.7 K/UL (ref 0.05–1.2)
MONOCYTES NFR BLD: 8 % (ref 3–10)
NEUTS SEG # BLD: 5.2 K/UL (ref 1.8–8)
NEUTS SEG NFR BLD: 57 % (ref 40–73)
PLATELET # BLD AUTO: 283 K/UL (ref 135–420)
PMV BLD AUTO: 11.5 FL (ref 9.2–11.8)
RBC # BLD AUTO: 5.28 M/UL (ref 4.7–5.5)
WBC # BLD AUTO: 9.2 K/UL (ref 4.6–13.2)

## 2019-12-31 PROCEDURE — 85025 COMPLETE CBC W/AUTO DIFF WBC: CPT

## 2019-12-31 PROCEDURE — 86003 ALLG SPEC IGE CRUDE XTRC EA: CPT

## 2019-12-31 PROCEDURE — 82785 ASSAY OF IGE: CPT

## 2019-12-31 PROCEDURE — 36415 COLL VENOUS BLD VENIPUNCTURE: CPT

## 2020-01-03 LAB — IGE SERPL-ACNC: 619 IU/ML (ref 6–495)

## 2020-01-04 LAB
A ALTERNATA IGE QN: 0.12 KU/L
A FUMIGATUS IGE QN: <0.1 KU/L
AMER ROACH IGE QN: <0.1 KU/L
AMER SYCAMORE IGE QN: <0.1 KU/L
BAHIA GRASS IGE QN: <0.1 KU/L
BERMUDA GRASS IGE QN: <0.1 KU/L
BOXELDER IGE QN: 0.36 KU/L
C HERBARUM IGE QN: <0.1 KU/L
CAT DANDER IGG QN: <0.1 KU/L
CLASS DESCRIPTION, 600268: ABNORMAL
COMMON RAGWEED IGE QN: <0.1 KU/L
D FARINAE IGE QN: 0.16 KU/L
D PTERONYSS IGE QN: 0.15 KU/L
DEPRECATED IGE QN: <0.1 KU/L
DOG DANDER IGE QN: <0.1 KU/L
ENGL PLANTAIN IGE QN: <0.1 KU/L
JOHNSON GRASS IGE QN: <0.1 KU/L
M RACEMOSUS IGE QN: <0.1 KU/L
MT JUNIPER IGE QN: <0.1 KU/L
MUGWORT IGE QN: <0.1 KU/L
NETTLE IGE QN: <0.1 KU/L
P NOTATUM IGE QN: <0.1 KU/L
S BOTRYOSUM IGE QN: <0.1 KU/L
SHEEP SORREL IGE QN: <0.1 KU/L
SWEET GUM IGE QN: <0.1 KU/L
TIMOTHY IGE QN: <0.1 KU/L
WHITE BIRCH IGE QN: <0.1 KU/L
WHITE ELM IGG QN: <0.1 KU/L
WHITE HICKORY IGE QN: <0.1 KU/L
WHITE MULBERRY IGE QN: <0.1 KU/L
WHITE OAK IGE QN: <0.1 KU/L

## 2020-01-06 DIAGNOSIS — M47.816 LUMBAR FACET ARTHROPATHY: ICD-10-CM

## 2020-01-06 DIAGNOSIS — M54.16 LUMBAR RADICULOPATHY: ICD-10-CM

## 2020-01-06 DIAGNOSIS — M54.50 LUMBAR SPINE PAIN: ICD-10-CM

## 2020-01-06 DIAGNOSIS — M48.062 SPINAL STENOSIS OF LUMBAR REGION WITH NEUROGENIC CLAUDICATION: ICD-10-CM

## 2020-01-06 DIAGNOSIS — M54.2 CERVICAL PAIN: ICD-10-CM

## 2020-01-06 RX ORDER — MELOXICAM 15 MG/1
15 TABLET ORAL DAILY
Qty: 30 TAB | Refills: 2 | OUTPATIENT
Start: 2020-01-06

## 2020-01-06 RX ORDER — AMITRIPTYLINE HYDROCHLORIDE 25 MG/1
75 TABLET, FILM COATED ORAL
Qty: 90 TAB | Refills: 2 | OUTPATIENT
Start: 2020-01-06

## 2020-01-06 NOTE — TELEPHONE ENCOUNTER
Patient is out of the medication    Last Visit: 10/2/19 block done with MD Sabas Yoder, last OV 9/17/19 with MD London Stanley  Next Appointment: 10/17/19 no show   Previous Refill Encounter(s): 8/8/19 Elavil #90 with 2 refills, 9/17/19 Mobic #30 with 2 refills    Requested Prescriptions     Pending Prescriptions Disp Refills    amitriptyline (ELAVIL) 25 mg tablet 90 Tab 2     Sig: Take 3 Tabs by mouth nightly.  meloxicam (MOBIC) 15 mg tablet 30 Tab 2     Sig: Take 1 Tab by mouth daily.

## 2020-01-20 ENCOUNTER — OFFICE VISIT (OUTPATIENT)
Dept: FAMILY MEDICINE CLINIC | Age: 60
End: 2020-01-20

## 2020-01-20 ENCOUNTER — HOSPITAL ENCOUNTER (OUTPATIENT)
Dept: LAB | Age: 60
Discharge: HOME | End: 2020-01-20
Payer: MEDICARE

## 2020-01-20 VITALS
WEIGHT: 202.2 LBS | BODY MASS INDEX: 31.74 KG/M2 | HEIGHT: 67 IN | OXYGEN SATURATION: 87 % | SYSTOLIC BLOOD PRESSURE: 111 MMHG | TEMPERATURE: 96.2 F | HEART RATE: 89 BPM | RESPIRATION RATE: 18 BRPM | DIASTOLIC BLOOD PRESSURE: 73 MMHG

## 2020-01-20 DIAGNOSIS — M48.062 SPINAL STENOSIS OF LUMBAR REGION WITH NEUROGENIC CLAUDICATION: ICD-10-CM

## 2020-01-20 DIAGNOSIS — Z12.5 SCREENING PSA (PROSTATE SPECIFIC ANTIGEN): ICD-10-CM

## 2020-01-20 DIAGNOSIS — E11.40 TYPE 2 DIABETES MELLITUS WITH DIABETIC NEUROPATHY, WITHOUT LONG-TERM CURRENT USE OF INSULIN (HCC): Primary | ICD-10-CM

## 2020-01-20 DIAGNOSIS — Z87.891 PERSONAL HISTORY OF TOBACCO USE, PRESENTING HAZARDS TO HEALTH: ICD-10-CM

## 2020-01-20 DIAGNOSIS — M54.16 LUMBAR RADICULOPATHY: ICD-10-CM

## 2020-01-20 DIAGNOSIS — F51.01 PRIMARY INSOMNIA: ICD-10-CM

## 2020-01-20 DIAGNOSIS — Z23 ENCOUNTER FOR IMMUNIZATION: ICD-10-CM

## 2020-01-20 DIAGNOSIS — I42.9 CARDIOMYOPATHY, UNSPECIFIED TYPE (HCC): ICD-10-CM

## 2020-01-20 DIAGNOSIS — M54.2 CERVICAL PAIN: ICD-10-CM

## 2020-01-20 DIAGNOSIS — E78.00 HYPERCHOLESTEREMIA: ICD-10-CM

## 2020-01-20 DIAGNOSIS — I10 ESSENTIAL HYPERTENSION: ICD-10-CM

## 2020-01-20 DIAGNOSIS — F17.210 CIGARETTE NICOTINE DEPENDENCE WITHOUT COMPLICATION: ICD-10-CM

## 2020-01-20 DIAGNOSIS — M54.50 LUMBAR SPINE PAIN: ICD-10-CM

## 2020-01-20 DIAGNOSIS — E11.40 TYPE 2 DIABETES MELLITUS WITH DIABETIC NEUROPATHY, WITHOUT LONG-TERM CURRENT USE OF INSULIN (HCC): ICD-10-CM

## 2020-01-20 LAB
ALBUMIN SERPL-MCNC: 3.4 G/DL (ref 3.4–5)
ALBUMIN/GLOB SERPL: 0.8 {RATIO} (ref 0.8–1.7)
ALP SERPL-CCNC: 94 U/L (ref 45–117)
ALT SERPL-CCNC: 15 U/L (ref 16–61)
ANION GAP SERPL CALC-SCNC: 4 MMOL/L (ref 3–18)
APPEARANCE UR: CLEAR
AST SERPL-CCNC: 15 U/L (ref 10–38)
BACTERIA URNS QL MICRO: ABNORMAL /HPF
BASOPHILS # BLD: 0 K/UL (ref 0–0.1)
BASOPHILS NFR BLD: 0 % (ref 0–2)
BILIRUB SERPL-MCNC: 0.5 MG/DL (ref 0.2–1)
BILIRUB UR QL: NEGATIVE
BUN SERPL-MCNC: 21 MG/DL (ref 7–18)
BUN/CREAT SERPL: 16 (ref 12–20)
CALCIUM SERPL-MCNC: 10.4 MG/DL (ref 8.5–10.1)
CHLORIDE SERPL-SCNC: 99 MMOL/L (ref 100–111)
CHOLEST SERPL-MCNC: 183 MG/DL
CO2 SERPL-SCNC: 33 MMOL/L (ref 21–32)
COLOR UR: YELLOW
CREAT SERPL-MCNC: 1.35 MG/DL (ref 0.6–1.3)
CREAT UR-MCNC: 140 MG/DL (ref 30–125)
DIFFERENTIAL METHOD BLD: ABNORMAL
EOSINOPHIL # BLD: 0.3 K/UL (ref 0–0.4)
EOSINOPHIL NFR BLD: 3 % (ref 0–5)
EPITH CASTS URNS QL MICRO: ABNORMAL /LPF (ref 0–5)
ERYTHROCYTE [DISTWIDTH] IN BLOOD BY AUTOMATED COUNT: 16.5 % (ref 11.6–14.5)
EST. AVERAGE GLUCOSE BLD GHB EST-MCNC: 166 MG/DL
GLOBULIN SER CALC-MCNC: 4.1 G/DL (ref 2–4)
GLUCOSE SERPL-MCNC: 132 MG/DL (ref 74–99)
GLUCOSE UR STRIP.AUTO-MCNC: NEGATIVE MG/DL
HBA1C MFR BLD: 7.4 % (ref 4.2–5.6)
HCT VFR BLD AUTO: 49.6 % (ref 36–48)
HDLC SERPL-MCNC: 37 MG/DL (ref 40–60)
HDLC SERPL: 4.9 {RATIO} (ref 0–5)
HGB BLD-MCNC: 15.7 G/DL (ref 13–16)
HGB UR QL STRIP: NEGATIVE
KETONES UR QL STRIP.AUTO: NEGATIVE MG/DL
LDLC SERPL CALC-MCNC: 111.6 MG/DL (ref 0–100)
LEUKOCYTE ESTERASE UR QL STRIP.AUTO: NEGATIVE
LIPID PROFILE,FLP: ABNORMAL
LYMPHOCYTES # BLD: 3.4 K/UL (ref 0.9–3.6)
LYMPHOCYTES NFR BLD: 38 % (ref 21–52)
MCH RBC QN AUTO: 28.9 PG (ref 24–34)
MCHC RBC AUTO-ENTMCNC: 31.7 G/DL (ref 31–37)
MCV RBC AUTO: 91.2 FL (ref 74–97)
MICROALBUMIN UR-MCNC: 134 MG/DL (ref 0–3)
MICROALBUMIN/CREAT UR-RTO: 957 MG/G (ref 0–30)
MONOCYTES # BLD: 0.8 K/UL (ref 0.05–1.2)
MONOCYTES NFR BLD: 9 % (ref 3–10)
NEUTS SEG # BLD: 4.4 K/UL (ref 1.8–8)
NEUTS SEG NFR BLD: 50 % (ref 40–73)
NITRITE UR QL STRIP.AUTO: NEGATIVE
PH UR STRIP: 6 [PH] (ref 5–8)
PLATELET # BLD AUTO: 299 K/UL (ref 135–420)
PMV BLD AUTO: 11.2 FL (ref 9.2–11.8)
POTASSIUM SERPL-SCNC: 4.6 MMOL/L (ref 3.5–5.5)
PROT SERPL-MCNC: 7.5 G/DL (ref 6.4–8.2)
PROT UR STRIP-MCNC: 300 MG/DL
PSA SERPL-MCNC: 0.6 NG/ML (ref 0–4)
RBC # BLD AUTO: 5.44 M/UL (ref 4.7–5.5)
RBC #/AREA URNS HPF: ABNORMAL /HPF (ref 0–5)
SODIUM SERPL-SCNC: 136 MMOL/L (ref 136–145)
SP GR UR REFRACTOMETRY: 1.01 (ref 1–1.03)
T4 FREE SERPL-MCNC: 1.4 NG/DL (ref 0.7–1.5)
TRIGL SERPL-MCNC: 172 MG/DL (ref ?–150)
TSH SERPL DL<=0.05 MIU/L-ACNC: 0.92 UIU/ML (ref 0.36–3.74)
UROBILINOGEN UR QL STRIP.AUTO: 0.2 EU/DL (ref 0.2–1)
VLDLC SERPL CALC-MCNC: 34.4 MG/DL
WBC # BLD AUTO: 8.9 K/UL (ref 4.6–13.2)
WBC URNS QL MICRO: ABNORMAL /HPF (ref 0–4)

## 2020-01-20 PROCEDURE — 85025 COMPLETE CBC W/AUTO DIFF WBC: CPT

## 2020-01-20 PROCEDURE — 80061 LIPID PANEL: CPT

## 2020-01-20 PROCEDURE — 83036 HEMOGLOBIN GLYCOSYLATED A1C: CPT

## 2020-01-20 PROCEDURE — 81001 URINALYSIS AUTO W/SCOPE: CPT

## 2020-01-20 PROCEDURE — 84153 ASSAY OF PSA TOTAL: CPT

## 2020-01-20 PROCEDURE — 36415 COLL VENOUS BLD VENIPUNCTURE: CPT

## 2020-01-20 PROCEDURE — 80053 COMPREHEN METABOLIC PANEL: CPT

## 2020-01-20 PROCEDURE — 84439 ASSAY OF FREE THYROXINE: CPT

## 2020-01-20 PROCEDURE — 82043 UR ALBUMIN QUANTITATIVE: CPT

## 2020-01-20 RX ORDER — CYCLOBENZAPRINE HCL 10 MG
TABLET ORAL
COMMUNITY
End: 2020-09-09 | Stop reason: SDUPTHER

## 2020-01-20 RX ORDER — AMITRIPTYLINE HYDROCHLORIDE 50 MG/1
50 TABLET, FILM COATED ORAL
Qty: 90 TAB | Refills: 1 | Status: SHIPPED | OUTPATIENT
Start: 2020-01-20 | End: 2020-08-26 | Stop reason: SDUPTHER

## 2020-01-20 NOTE — PROGRESS NOTES
Chief Complaint   Patient presents with    Diabetes    Hypertension    COPD     pt use oxygen at home 2L, pt went to grab a piece of wife brownie, passed out and hit head on window, denies injuries        1. Have you been to the ER, urgent care clinic since your last visit? Hospitalized since your last visit? No    2. Have you seen or consulted any other health care providers outside of the [address] [place] [address] since your last visit? Include any pap smears or colon screening.  No

## 2020-01-20 NOTE — PROGRESS NOTES
History of Present Illness  Timoteo Balderas is a 64 y.o. male who presents today for management of    Chief Complaint   Patient presents with    Diabetes    Hypertension    COPD     pt use oxygen at home 2L, pt went to grab a piece of wife brownie, passed out and hit head on window, denies injuries      Patient was seen by pulmonary disease. He was placed on home oxygen which he uses as needed. He was referred to cardiology. He has an appointment this week. Diabetes Mellitus:  He has diabetes mellitus, and  hypertension, hyperlipidemia and cardiomyopathy EF 46-50%. Diabetic ROS - medication compliance: compliant all of the time, diabetic diet compliance: compliant all of the time, home glucose monitoring: values are usually normal, further diabetic ROS: no polyuria or polydipsia, no chest pain, dyspnea or TIA's, no numbness, tingling or pain in extremities. Lab review: orders written for new lab studies as appropriate; see orders. Cardiovascular Review:  Diet and Lifestyle: generally follows a low fat low cholesterol diet, generally follows a low sodium diet, sedentary, smoker 1 cigarette every 3 days  Home BP Monitoring: is not measured at home. Pertinent ROS: taking medications as instructed, no medication side effects noted, no TIA's, no chest pain on exertion, notes stable dyspnea on exertion, no change, no swelling of ankles.      Problem List  Patient Active Problem List    Diagnosis Date Noted    Degeneration of intervertebral disc of lumbar region 10/22/2019    Cigarette nicotine dependence without complication 22/52/7281    Spinal stenosis of lumbar region 06/25/2019    Cardiomyopathy ([facility] [address] 04/23/2019    Other proteinuria 03/07/2019    Type 2 diabetes mellitus with diabetic neuropathy ([address] 02/09/2018    Clubbing of nail 03/23/2016    Chronic obstructive pulmonary disease ([address] 03/23/2016    JAIMEE (obstructive sleep apnea) 09/17/2015    Midline low back pain without sciatica 09/03/2015    Essential hypertension 09/03/2015    Hypercholesteremia 09/03/2015    Hip pain 09/03/2015       Past Medical History  Past Medical History:   Diagnosis Date    Back pain     [facility] obstructive pulmonary disease ([facility]     Diabetes ([facility] [address]     Hypercholesterolemia     Hypertension     Sleep apnea     Does not use CPAP        Surgical History  Past Surgical History:   Procedure Laterality Date    COLONOSCOPY N/A 1/23/2017    COLONOSCOPY performed by Héctor Waite MD at [address]  2003    umbilacal     HX SHOULDER ARTHROSCOPY  2004    bilateral        Current Medications  Current Outpatient Medications   Medication Sig    cyclobenzaprine (FLEXERIL) 10 mg tablet Take  by mouth three (3) times daily as needed for Muscle Spasm(s).  amitriptyline (ELAVIL) 50 mg tablet Take 1 Tab by mouth nightly.  fluticasone-umeclidinium-vilanterol (TRELEGY ELLIPTA) 100-62.5-25 mcg inhaler Take 1 Puff by inhalation daily.  furosemide (LASIX) 40 mg tablet Take 1 Tab by mouth daily.  albuterol (PROVENTIL HFA, VENTOLIN HFA, PROAIR HFA) 90 mcg/actuation inhaler Take 2 Puffs by inhalation every four (4) hours as needed for Wheezing or Shortness of Breath.  cetirizine (ZYRTEC) 10 mg tablet TAKE 1 TABLET BY MOUTH ONCE DAILY    carvedilol (COREG) 6.25 mg tablet Take 1 Tab by mouth two (2) times daily (with meals).  multivit with min-folic acid 536 mcg TbER Take 1 Cap by mouth daily.  aspirin 81 mg chewable tablet Take 1 Tab by mouth daily.  losartan (COZAAR) 50 mg tablet Take 1 Tab by mouth daily.  gabapentin (NEURONTIN) 300 mg capsule Take 1 Cap by mouth four (4) times daily. (Patient taking differently: Take 300 mg by mouth two (2) times a day.)    pravastatin (PRAVACHOL) 40 mg tablet Take 1.5 Tabs by mouth nightly.  Take one-half tablet by mouth at bedtime    carboxymethylcellulose sodium (REFRESH TEARS) 0.5 % drop ophthalmic solution Administer 1 Drop to both eyes two (2) times a day.  clobetasol (TEMOVATE) 0.05 % ointment Apply  to affected area two (2) times a day.  cholecalciferol (VITAMIN D3) 1,000 unit tablet Take 1,000 Units by mouth daily.  omega-3 fatty acids-vitamin e (FISH OIL) 1,000 mg cap Take 1 Cap by mouth two (2) times a day.  metFORMIN (GLUCOPHAGE) 1,000 mg tablet Take 1,000 mg by mouth two (2) times daily (with meals).  glipiZIDE (GLUCOTROL) 10 mg tablet Take 10 mg by mouth three (3) times daily.  amLODIPine (NORVASC) 10 mg tablet Take 10 mg by mouth daily. No current facility-administered medications for this visit. Allergies/Drug Reactions  Allergies   Allergen Reactions    Lisinopril Swelling        Family History  Family History   Problem Relation Age of Onset   [facility]rist[facility] Cancer Mother     Hypertension Mother     Heart Disease Mother     Diabetes Mother     No Known Problems Father         Social History  Social History     Socioeconomic History    Marital status:      Spouse name: Not on file    Number of children: Not on file    Years of education: Not on file    Highest education level: Not on file   Occupational History    Not on file   Social Needs    Financial resource strain: Not on file    Food insecurity:     Worry: Not on file     Inability: Not on file    Transportation needs:     Medical: Not on file     Non-medical: Not on file   Tobacco Use    Smoking status: Current Every Day Smoker     Packs/day: 1.00     Years: 25.00     Pack years: 25.00     Types: Cigarettes     Start date: 4/9/1986    Smokeless tobacco: Former User     Quit date: 3/16/2016   Substance and Sexual Activity    Alcohol use:  Yes     Alcohol/week: 4.0 standard drinks     Types: 4 Shots of liquor per week    Drug use: No    Sexual activity: Yes     Partners: Female   Lifestyle    Physical activity:     Days per week: Not on file     Minutes per session: Not on file    Stress: Not on file   Relationships    Social connections:     Talks on phone: Not on file     Gets together: Not on file     Attends Protestant service: Not on file     Active member of club or organization: Not on file     Attends meetings of clubs or organizations: Not on file     Relationship status: Not on file    Intimate partner violence:     Fear of current or ex partner: Not on file     Emotionally abused: Not on file     Physically abused: Not on file     Forced sexual activity: Not on file   Other Topics Concern    Not on file   Social History Narrative    Not on file       Review of Systems  Review of Systems   Constitutional: Negative. HENT: Negative. Respiratory: Positive for shortness of breath. Cardiovascular: Negative. Gastrointestinal: Negative. Genitourinary: Negative. Musculoskeletal: Positive for joint pain. Neurological: Negative. Psychiatric/Behavioral: Negative.           Physical Exam  Vital signs:   Vitals:    01/20/20 1200   BP: 111/73   Pulse: 89   Resp: 18   Temp: 96.2 °F (35.7 °C)   TempSrc: Oral   SpO2: (!) 87%   Weight: 202 lb 3.2 oz (91.7 kg)   Height: 5' 7\" (1.702 m)       General: alert, oriented, not in distress  Eyes: clear conjunctivae, anicteric sclerae, full and equal ROMs  Chest/Lungs: clear breath sounds, no wheezing or crackles  Heart: normal rate, regular rhythm, no murmur  Extremities: no focal deformities, no edema  Neuro: AAOx3, CN's grossly intact  Skin: no visible abnormalities      Laboratory/Tests:    Component      Latest Ref Rng & Units 6/25/2019 6/25/2019 1/22/2019 1/22/2019          10:24 AM 10:23 AM 11:16 AM 11:16 AM   Sodium      136 - 145 mmol/L  138     Potassium      3.5 - 5.5 mmol/L  4.0     Chloride      100 - 108 mmol/L  102     CO2      21 - 32 mmol/L  31     Anion gap      3.0 - 18 mmol/L  5     Glucose      74 - 99 mg/dL  161 (H)     BUN      7.0 - 18 MG/DL  19 (H)     Creatinine      0.6 - 1.3 MG/DL  1.22     BUN/Creatinine ratio      12 - 20    16     GFR est AA      >60 ml/min/1.73m2  >60     GFR est non-AA      >60 ml/min/1.73m2  >60     Calcium      8.5 - 10.1 MG/DL  9.5     Cholesterol, total      <200 MG/DL    194   Triglyceride      <150 MG/DL    194 (H)   HDL Cholesterol      40 - 60 MG/DL    46   LDL, calculated      0 - 100 MG/DL    109.2 (H)   VLDL, calculated      MG/DL    38.8   CHOL/HDL Ratio      0 - 5.0      4.2   Hemoglobin A1c, (calculated)      4.2 - 5.6 %   8.6 (H)    Est. average glucose      mg/dL   200    Hemoglobin A1c (POC)      % 6.7        Assessment/Plan:       1. Type 2 diabetes mellitus with diabetic neuropathy, without long-term current use of insulin (HCC)  - controlled  - HEMOGLOBIN A1C; Future  - CBC WITH AUTOMATED DIFF; Future  - METABOLIC PANEL, COMPREHENSIVE; Future  - MICROALBUMIN, UR, RAND W/ MICROALB/CREAT RATIO; Future    2. Essential hypertension  - controlled  - continue coreg, amlodipine and Lasix  - stop HCTZ  - carvedilol (COREG) 6.25 mg tablet; Take 1 Tab by mouth two (2) times daily (with meals). Dispense: 180 Tab; Refill: 3  - METABOLIC PANEL, COMPREHENSIVE; Future  - URINALYSIS W/ RFLX MICROSCOPIC; Future     3. Cardiomyopathy, unspecified type ([facility]  - EF 46-50%  - no signs of acute decompensation  - carvedilol (COREG) 6.25 mg tablet; Take 1 Tab by mouth two (2) times daily (with meals). Dispense: 180 Tab; Refill: 3  - has appointment with cardiology in 3 days  - d/c propranolol  - METABOLIC PANEL, COMPREHENSIVE; Future    4. Pulmonary emphysema, unspecified emphysema type ([facility]  - stable  - now on home oxygen  - continue current meds     5. Chronic midline low back pain without sciatica  - on Robaxin, gabapentin and amitriptyline  - ortho follow-up     6. Hypercholesteremia  - continue pravastatin  - LIPID PANEL; Future  - METABOLIC PANEL, COMPREHENSIVE; Future     7. Screening PSA (prostate specific antigen)  - PSA SCREENING (SCREENING); Future     8.  Cigarette nicotine dependence without complication  - smoking cessation counseling  - check low-dose CT lung cancer screening    Follow-up and Dispositions    · Return in about 3 months (around 4/20/2020) for DM, HTN, HLD. I have discussed the diagnosis with the patient and the intended plan as seen in the above orders. The patient has received an after-visit summary and questions were answered concerning future plans. I have discussed medication side effects and warnings with the patient as well. I have reviewed the plan of care with the patient, accepted their input and they are in agreement with the treatment goals. Lila Suero MD  January 20, 2020      Discussed with patient current guidelines for screening for lung cancer. Current recommendations are to obtain yearly screening LDCT yearly for age 46-80, or until smoke free for 15 years. Patient has 35 pack year history of cigarette smoking and currently 1 cigarette every 3 days. Discussed with patient risks and benefits of screening, including over-diagnosis, false positive rate, and total radiation exposure. Patient currently exhibits no signs or symptoms suggestive of lung cancer. Discussed with patient importance of compliance with yearly annual lung cancer screenings and willingness to undergo diagnosis and treatment if screening scan is positive. In addition, patient was counseled regarding (remaining smoke free/total smoking cessation).

## 2020-01-20 NOTE — PROGRESS NOTES
After obtaining consent, and per orders of Dr. Zain Kelly, injection of Zoster Vaccine Recombinant given by Saskia Abel LPN. Patient instructed to remain in clinic for 20 minutes afterwards, and to report any adverse reaction to me immediately.

## 2020-01-21 DIAGNOSIS — E78.00 HYPERCHOLESTEREMIA: Primary | ICD-10-CM

## 2020-01-21 RX ORDER — ATORVASTATIN CALCIUM 40 MG/1
40 TABLET, FILM COATED ORAL DAILY
Qty: 90 TAB | Refills: 1 | Status: SHIPPED | OUTPATIENT
Start: 2020-01-21 | End: 2020-07-29

## 2020-01-21 NOTE — PROGRESS NOTES
Please inform patient that HbA1c improved from 8.8 to 7.4%. cholesterol is elevated. Will change pravastatin to atorvastatin. Rx sent to pharmacy. Please inform patient. Follow-up in 3 months.

## 2020-01-23 ENCOUNTER — OFFICE VISIT (OUTPATIENT)
Dept: CARDIOLOGY CLINIC | Age: 60
End: 2020-01-23

## 2020-01-23 VITALS
WEIGHT: 199 LBS | OXYGEN SATURATION: 91 % | SYSTOLIC BLOOD PRESSURE: 122 MMHG | HEIGHT: 67 IN | HEART RATE: 123 BPM | DIASTOLIC BLOOD PRESSURE: 85 MMHG | BODY MASS INDEX: 31.23 KG/M2

## 2020-01-23 DIAGNOSIS — I27.20 PULMONARY HTN (HCC): Primary | ICD-10-CM

## 2020-01-23 DIAGNOSIS — R07.9 CHEST PAIN, UNSPECIFIED TYPE: ICD-10-CM

## 2020-01-23 DIAGNOSIS — R06.00 DYSPNEA, UNSPECIFIED TYPE: ICD-10-CM

## 2020-01-23 NOTE — PROGRESS NOTES
[facility]    Mr. Gage Dave is a 82-year-old male with a history of COPD, diabetes, hypertension, hyperlipidemia, sleep apnea and pulmonary hypertension    Patient is here today for initial evaluation. He denies any prior history of myocardial infarction or congestive heart failure. Mr. Cristian Carmona was asked to come see me for the evaluation of dyspnea and pulmonary hypertension. Over last few year his shortness of breath is getting worse up to the point that he cannot walk more than 2 blocks without getting short of breath. He occasionally has chest pressure lasting less than 3 or 4 minutes especially with exertion. This happens for last 6-month. There is no resting symptoms. There is no nausea or vomiting. He gets occasional exertional sweating. He uses 2 pillows at night. He admits that he has not been using CPAP machine which was prescribed more than 10 years ago for his sleep apnea. He denies significant lower extremity edema. He denies any palpitation, presyncope or syncope. Denies any nausea, vomiting, abdominal pain, fever, chills, sputum production. No hematuria or other bleeding complaints    Past Medical History:   Diagnosis Date    Back pain     Cardiomyopathy ([address]     LVEF 45% (2019)    Chronic obstructive pulmonary disease (HCC)     Diabetes [facility]     Hypercholesterolemia     Hypertension     Pulmonary hypertension ([facility]     Sleep apnea     Does not use CPAP    Tobacco abuse          Past Surgical History:   Procedure Laterality Date    COLONOSCOPY N/A 1/23/2017    COLONOSCOPY performed by Lucas Kaminski MD at [address] [place] [address]  2003    umbilacal     HX SHOULDER ARTHROSCOPY  2004    bilateral       Current Outpatient Medications   Medication Sig    atorvastatin (LIPITOR) 40 mg tablet Take 1 Tab by mouth daily.     cyclobenzaprine (FLEXERIL) 10 mg tablet Take  by mouth three (3) times daily as needed for Muscle Spasm(s).  amitriptyline (ELAVIL) 50 mg tablet Take 1 Tab by mouth nightly.  fluticasone-umeclidinium-vilanterol (TRELEGY ELLIPTA) 100-62.5-25 mcg inhaler Take 1 Puff by inhalation daily.  furosemide (LASIX) 40 mg tablet Take 1 Tab by mouth daily.  albuterol (PROVENTIL HFA, VENTOLIN HFA, PROAIR HFA) 90 mcg/actuation inhaler Take 2 Puffs by inhalation every four (4) hours as needed for Wheezing or Shortness of Breath.  cetirizine (ZYRTEC) 10 mg tablet TAKE 1 TABLET BY MOUTH ONCE DAILY    carvedilol (COREG) 6.25 mg tablet Take 1 Tab by mouth two (2) times daily (with meals).  multivit with min-folic acid 995 mcg TbER Take 1 Cap by mouth daily.  aspirin 81 mg chewable tablet Take 1 Tab by mouth daily.  losartan (COZAAR) 50 mg tablet Take 1 Tab by mouth daily.  gabapentin (NEURONTIN) 300 mg capsule Take 1 Cap by mouth four (4) times daily. (Patient taking differently: Take 300 mg by mouth two (2) times a day.)    carboxymethylcellulose sodium (REFRESH TEARS) 0.5 % drop ophthalmic solution Administer 1 Drop to both eyes two (2) times a day.  clobetasol (TEMOVATE) 0.05 % ointment Apply  to affected area two (2) times a day.  cholecalciferol (VITAMIN D3) 1,000 unit tablet Take 1,000 Units by mouth daily.  omega-3 fatty acids-vitamin e (FISH OIL) 1,000 mg cap Take 1 Cap by mouth two (2) times a day.  metFORMIN (GLUCOPHAGE) 1,000 mg tablet Take 1,000 mg by mouth two (2) times daily (with meals).  glipiZIDE (GLUCOTROL) 10 mg tablet Take 10 mg by mouth three (3) times daily.  amLODIPine (NORVASC) 10 mg tablet Take 10 mg by mouth daily. No current facility-administered medications for this visit.         Allergies and Sensitivities:  Allergies   Allergen Reactions    Lisinopril Swelling       Family History:  Family History   Problem Relation Age of Onset   Judieth Jessie Cancer Mother     Hypertension Mother     Heart Disease Mother     Diabetes Mother     No Known Problems Father        Social History:  Social History     Tobacco Use    Smoking status: Current Every Day Smoker     Packs/day: 1.00     Years: 35.00     Pack years: 35.00     Types: Cigarettes     Start date: 4/9/1986    Smokeless tobacco: Former User     Quit date: 3/16/2016   Substance Use Topics    Alcohol use: Yes     Alcohol/week: 4.0 standard drinks     Types: 4 Shots of liquor per week    Drug use: No     He  reports that he has been smoking cigarettes. He started smoking about 33 years ago. He has a 35.00 pack-year smoking history. He quit smokeless tobacco use about 3 years ago. He  reports current alcohol use of about 4.0 standard drinks of alcohol per week. Review of Systems:  Cardiac symptoms as noted above in HPI. All others negative. Denies fatigue, malaise, skin rash, joint pain, blurring vision, photophobia, neck pain, hemoptysis, chronic cough, nausea, vomiting, hematuria, burning micturition, BRBPR, chronic headaches. Physical Exam:  BP Readings from Last 3 Encounters:   01/23/20 122/85   01/20/20 111/73   11/15/19 111/75         Pulse Readings from Last 3 Encounters:   01/23/20 (!) 123   01/20/20 89   11/15/19 (!) 115          Wt Readings from Last 3 Encounters:   01/23/20 199 lb (90.3 kg)   01/20/20 202 lb 3.2 oz (91.7 kg)   11/15/19 212 lb (96.2 kg)       Constitutional: Oriented to person, place, and time. HENT: Head: Normocephalic and atraumatic. Eyes: Conjunctivae and extraocular motions are normal.   Neck: No JVD present. Carotid bruit is not appreciated. Cardiovascular: Regular rhythm. No murmur, gallop or rubs appreciated  Lung: Breath sounds normal. No respiratory distress. No ronchi or rales appreciated  Abdominal: No tenderness. No rebound and no guarding. Musculoskeletal: There is no lower extremity edema. No cynosis  Lymphadenopathy:  No cervical or supraclavicular adenopathy appriciated. Neurological: No gross motor deficit noted.   Skin: No visible skin rash noted.   No Ear discharge noted  Psychiatric: Normal mood and affect. Good distal pulse    Review of Data  LABS:   Lab Results   Component Value Date/Time    Sodium 136 01/20/2020 12:55 PM    Potassium 4.6 01/20/2020 12:55 PM    Chloride 99 (L) 01/20/2020 12:55 PM    CO2 33 (H) 01/20/2020 12:55 PM    Glucose 132 (H) 01/20/2020 12:55 PM    BUN 21 (H) 01/20/2020 12:55 PM    Creatinine 1.35 (H) 01/20/2020 12:55 PM     Lipids Latest Ref Rng & Units 1/20/2020 1/22/2019 9/11/2018 5/8/2018 12/22/2017   Chol, Total <200 MG/ 194 194 195 187   HDL 40 - 60 MG/DL 37(L) 46 37(L) 51 58   LDL 0 - 100 MG/. 6(H) 109. 2(H) 82.8 98.4 90.6   Trig <150 MG/(H) 194(H) 371(H) 228(H) 192(H)   Chol/HDL Ratio 0 - 5.0   4.9 4.2 5.2(H) 3.8 3.2   Some recent data might be hidden     Lab Results   Component Value Date/Time    ALT (SGPT) 15 (L) 01/20/2020 12:55 PM     Lab Results   Component Value Date/Time    Hemoglobin A1c 7.4 (H) 01/20/2020 12:55 PM    Hemoglobin A1c (POC) 6.7 06/25/2019 10:24 AM       EKG    ECHO (04/19)  Left Ventricle Normal cavity size. Upper normal wall thickness observed. There is mildly decreased systolic function. The estimated ejection fraction is 46 - 50%. Visually measured ejection fraction. Global hypokinesis observed. There is mild (grade 1) left ventricular diastolic dysfunction. Left Atrium Normal cavity size. Right Ventricle Normal global systolic function. The cavity size is mildly dilated. Right Atrium The cavity size is mildly dilated. Aortic Valve No stenosis and no regurgitation. Mild aortic valve sclerosis with no evidence of reduced excursion. Mitral Valve No stenosis. Mitral valve non-specific thickening. w/o reduced excursion. Trace regurgitation. Tricuspid Valve Tricuspid valve not well visualized. No stenosis. Moderate tricuspid valve regurgitation. Pulmonary arterial systolic pressure is 63.6 mmHg. Moderate to severe pulmonary hypertension.    Pulmonic Valve Pulmonic valve normal doppler findings. Aorta Normal aortic root, ascending aortic, and aortic arch. STRESS TEST (EST, PHARM, NUC, ECHO etc)    CATHETERIZATION    IMPRESSION & PLAN:  Mr. Efrain Valverde is 60-year male with a history of cardiomyopathy, hypertension, hyperlipidemia, diabetes, sleep apnea, pulmonary hypertension    Cardiomyopathy:  LVEF 45-50% in March 2019. Currently on Coreg and losartan. NYHA class II symptoms. Dyspnea slowly getting worse  Will obtain echo to evaluate EF and pulmonary hypertension in setting of dyspnea and chest pain    Chest pain /dyspnea: Will obtain echo to evaluate EF and pulmonary hypertension in setting of dyspnea and chest pain  Will order nuclear stress test for risk stratification  Currently patient is on atorvastatin, Coreg, aspirin. Diabetes:  Goal hemoglobin A1c less than 7.5 is recommended from cardiovascular standpoint. Defer to PCP    Hyperlipidemia:  Last . Goal LDL 70 discussed with patient. Currently on atorvastatin 40. If this is not at goal in next 6-month, would recommend increasing dose    Hypertension:  BP normal with current medications. Continue same  Will obtain echo to evaluate EF and pulmonary hypertension in setting of dyspnea and chest pain    Pulmonary hypertension:  Likely from COPD and sleep apnea. Not using CPAP machine. I have strongly advised [facility] and start using [facility] machine. This is likely from underlying pulmonary pathology  Will obtain echo to evaluate EF and pulmonary hypertension in setting of dyspnea and chest pain  May need right heart catheterization depending on test finding    This plan was discussed with patient who is in agreement. Thank you for allowing me to participate in patient care. Please feel free to call me if you have any question or concern. Kelsie Cobb MD  Please note: This document has been produced using voice recognition software.  Unrecognized errors in transcription may be present.

## 2020-01-23 NOTE — PATIENT INSTRUCTIONS
Echo and  Nuclear Stress- 
Please call central scheduling at [phone] All testing/lab work is completed at [facility], [facility] Contact office three days after testing for results

## 2020-01-27 ENCOUNTER — TELEPHONE (OUTPATIENT)
Dept: FAMILY MEDICINE CLINIC | Age: 60
End: 2020-01-27

## 2020-01-27 NOTE — TELEPHONE ENCOUNTER
Pt returned phone call, advised him of lab results per Dr. ORTIZ and to stop taking pravastatin and to start new cholesterol medication that was sent to [address] [facility].  He verbalized understanding

## 2020-01-27 NOTE — TELEPHONE ENCOUNTER
----- Message from Anne Presley MD sent at 1/21/2020 10:20 AM EST -----  Please inform patient that HbA1c improved from 8.8 to 7.4%. cholesterol is elevated. Will change pravastatin to atorvastatin. Rx sent to pharmacy. Please inform patient. Follow-up in 3 months.

## 2020-01-28 ENCOUNTER — HOSPITAL ENCOUNTER (OUTPATIENT)
Dept: NON INVASIVE DIAGNOSTICS | Age: 60
Discharge: HOME | End: 2020-01-28
Attending: INTERNAL MEDICINE
Payer: MEDICARE

## 2020-01-28 ENCOUNTER — HOSPITAL ENCOUNTER (OUTPATIENT)
Dept: NUCLEAR MEDICINE | Age: 60
Discharge: HOME | End: 2020-01-28
Attending: INTERNAL MEDICINE
Payer: MEDICARE

## 2020-01-28 ENCOUNTER — NURSE NAVIGATOR (OUTPATIENT)
Dept: OTHER | Age: 60
End: 2020-01-28

## 2020-01-28 VITALS
WEIGHT: 199 LBS | BODY MASS INDEX: 31.23 KG/M2 | SYSTOLIC BLOOD PRESSURE: 122 MMHG | DIASTOLIC BLOOD PRESSURE: 85 MMHG | HEIGHT: 67 IN

## 2020-01-28 VITALS
HEIGHT: 69 IN | DIASTOLIC BLOOD PRESSURE: 96 MMHG | BODY MASS INDEX: 30.3 KG/M2 | SYSTOLIC BLOOD PRESSURE: 149 MMHG | WEIGHT: 204.6 LBS

## 2020-01-28 DIAGNOSIS — I27.20 PULMONARY HTN (HCC): ICD-10-CM

## 2020-01-28 DIAGNOSIS — R07.9 CHEST PAIN, UNSPECIFIED TYPE: ICD-10-CM

## 2020-01-28 DIAGNOSIS — R06.00 DYSPNEA, UNSPECIFIED TYPE: ICD-10-CM

## 2020-01-28 LAB
AV VELOCITY RATIO: 0.68
ECHO AO ASC DIAM: 3.13 CM
ECHO AO ROOT DIAM: 3.08 CM
ECHO AV AREA PEAK VELOCITY: 2.5 CM2
ECHO AV AREA/BSA PEAK VELOCITY: 1.2 CM2/M2
ECHO AV PEAK GRADIENT: 6.3 MMHG
ECHO AV PEAK VELOCITY: 125.44 CM/S
ECHO IVC SNIFF: 2.12 CM
ECHO LA MAJOR AXIS: 3.97 CM
ECHO LA TO AORTIC ROOT RATIO: 1.29
ECHO LV EDV A2C: 134.4 ML
ECHO LV EDV A4C: 115.8 ML
ECHO LV EDV BP: 127.1 ML (ref 67–155)
ECHO LV EDV INDEX A4C: 57.4 ML/M2
ECHO LV EDV INDEX BP: 63 ML/M2
ECHO LV EDV NDEX A2C: 66.6 ML/M2
ECHO LV EDV TEICHHOLZ: 1.14 ML
ECHO LV EJECTION FRACTION A2C: 37 %
ECHO LV EJECTION FRACTION A4C: 53 %
ECHO LV EJECTION FRACTION BIPLANE: 43.9 % (ref 55–100)
ECHO LV ESV A2C: 85.4 ML
ECHO LV ESV A4C: 54.3 ML
ECHO LV ESV BP: 71.3 ML (ref 22–58)
ECHO LV ESV INDEX A2C: 42.3 ML/M2
ECHO LV ESV INDEX A4C: 26.9 ML/M2
ECHO LV ESV INDEX BP: 35.3 ML/M2
ECHO LV ESV TEICHHOLZ: 0.75 ML
ECHO LV INTERNAL DIMENSION DIASTOLIC: 6.21 CM (ref 4.2–5.9)
ECHO LV INTERNAL DIMENSION SYSTOLIC: 5.18 CM
ECHO LV IVSD: 1.07 CM (ref 0.6–1)
ECHO LV MASS 2D: 356.6 G (ref 88–224)
ECHO LV MASS INDEX 2D: 176.7 G/M2 (ref 49–115)
ECHO LV POSTERIOR WALL DIASTOLIC: 1.14 CM (ref 0.6–1)
ECHO LVOT DIAM: 2.19 CM
ECHO LVOT PEAK GRADIENT: 2.9 MMHG
ECHO LVOT PEAK VELOCITY: 84.84 CM/S
ECHO LVOT SV: 46.6 ML
ECHO LVOT VTI: 12.39 CM
ECHO MV A VELOCITY: 83.36 CM/S
ECHO MV E DECELERATION TIME (DT): 186 MS
ECHO MV E VELOCITY: 68.8 CM/S
ECHO MV E/A RATIO: 0.83
ECHO PULMONARY ARTERY SYSTOLIC PRESSURE (PASP): 69.8 MMHG
ECHO RA MINOR AXIS: 4.98 CM
ECHO TV REGURGITANT MAX VELOCITY: 393 CM/S
ECHO TV REGURGITANT PEAK GRADIENT: 61.8 MMHG
LVFS 2D: 16.55 %
LVOT MG: 1.7 MMHG
LVOT MV: 0.62 CM/S
LVSV (MOD BI): 27.01 ML
LVSV (MOD SINGLE 4C): 29.76 ML
LVSV (MOD SINGLE): 23.75 ML
LVSV (TEICH): 32.04 ML
MV DEC SLOPE: 3.7
STRESS BASELINE HR: 105 BPM
STRESS ESTIMATED WORKLOAD: 1.4 METS
STRESS EXERCISE DUR MIN: NORMAL
STRESS PEAK DIAS BP: 98 MMHG
STRESS PEAK SYS BP: 162 MMHG
STRESS PERCENT HR ACHIEVED: 82 %
STRESS POST PEAK HR: 134 BPM
STRESS RATE PRESSURE PRODUCT: NORMAL BPM*MMHG
STRESS ST DEPRESSION: 0 MM
STRESS ST ELEVATION: 0 MM
STRESS TARGET HR: 163 BPM

## 2020-01-28 PROCEDURE — 93017 CV STRESS TEST TRACING ONLY: CPT

## 2020-01-28 PROCEDURE — 74011250636 HC RX REV CODE- 250/636: Performed by: INTERNAL MEDICINE

## 2020-01-28 PROCEDURE — 93306 TTE W/DOPPLER COMPLETE: CPT

## 2020-01-28 PROCEDURE — A9500 TC99M SESTAMIBI: HCPCS

## 2020-01-28 RX ADMIN — REGADENOSON 0.4 MG: 0.08 INJECTION, SOLUTION INTRAVENOUS at 11:16

## 2020-01-28 NOTE — NURSE NAVIGATOR
Referring Provider: Felix Arvizu MD      Lung Cancer Risk Profile:   Age: 64  Gender: Male  Height: 67\"  Weight: 199#    Smoking History:  Smoking Status: current use  # years smokin  # years quit: 0  Packs/day: 1  Pack years: 29    Patient discussed smoking cessation with PCP: Yes, per provider note    Patient participated in shared decision making process with PCP: Yes, per provider note    Patient is currently experiencing symptoms: No, per patient report    If yes what symptoms:     Co-Morbidities:  COPD    Cancer History:      Additional Risk Factors:    Exposure to second hand smoke      Patient's smoking history discussed via phone. Patient meets LDCT lung cancer screening criteria.  Call transferred to central scheduling to schedule exam.      [place] RN, [address] [address] [facility] Nurse Navigator

## 2020-02-04 ENCOUNTER — TELEPHONE (OUTPATIENT)
Dept: FAMILY MEDICINE CLINIC | Age: 60
End: 2020-02-04

## 2020-02-05 ENCOUNTER — TELEPHONE (OUTPATIENT)
Dept: CARDIOLOGY CLINIC | Age: 60
End: 2020-02-05

## 2020-02-05 ENCOUNTER — HOSPITAL ENCOUNTER (OUTPATIENT)
Dept: CT IMAGING | Age: 60
Discharge: HOME | End: 2020-02-05
Attending: INTERNAL MEDICINE
Payer: MEDICARE

## 2020-02-05 VITALS — BODY MASS INDEX: 30.45 KG/M2 | HEIGHT: 67 IN | WEIGHT: 194 LBS

## 2020-02-05 DIAGNOSIS — Z87.891 PERSONAL HISTORY OF TOBACCO USE, PRESENTING HAZARDS TO HEALTH: ICD-10-CM

## 2020-02-05 PROCEDURE — G0297 LDCT FOR LUNG CA SCREEN: HCPCS

## 2020-02-05 NOTE — TELEPHONE ENCOUNTER
Pt in office. Two patient Identifiers confirmed. Pt inquired on results of nuclear stess and echo. Advised pt that that Dr Elvia Chris was not in office today and I would consult and advise via TC upon his return. Pt verbalized understanding.

## 2020-02-05 NOTE — TELEPHONE ENCOUNTER
Pt walked into office, pt requesting cardio results. Nurse advised patient to request information from Dr. Leda Mendoza. Pt verbalized understanding. This encounter will be closed.

## 2020-02-06 ENCOUNTER — TELEPHONE (OUTPATIENT)
Dept: FAMILY MEDICINE CLINIC | Age: 60
End: 2020-02-06

## 2020-02-06 DIAGNOSIS — J43.9 PULMONARY EMPHYSEMA, UNSPECIFIED EMPHYSEMA TYPE (HCC): ICD-10-CM

## 2020-02-06 DIAGNOSIS — R91.1 PULMONARY NODULE, LEFT: Primary | ICD-10-CM

## 2020-02-06 NOTE — TELEPHONE ENCOUNTER
Pt called in and stated that the pharmacy was wanting  almost 1000 dollars combined for both fluticasone-umeclidinium-vilanterol (TRELEGY ELLIPTA) 100-62.5-25 mcg inhaler and the albuterol (PROVENTIL HFA, VENTOLIN HFA, PROAIR HFA) 90 mcg/actuation inhaler and was wanting to know if Dr. Oneil Clayton could have them sent to the [address] [place] [address], [address] so he can get them filled there. Please advise.

## 2020-02-06 NOTE — TELEPHONE ENCOUNTER
Contacted pt at [facility] number. Two patient Identifiers confirmed. Advised pt per Dr Richelle Malin. Pt verbalized understanding and scheduled for Monday.

## 2020-02-06 NOTE — PROGRESS NOTES
Please inform patient that Chest CT scan showed a lung nodule 5mm on the left lung. Most nodules do not cause any harm, but needs to be monitored. Recommended to have a repeat CT scan in 6 months (ordered). Advise to keep appointment in April 2020.

## 2020-02-07 ENCOUNTER — TELEPHONE (OUTPATIENT)
Dept: FAMILY MEDICINE CLINIC | Age: 60
End: 2020-02-07

## 2020-02-07 NOTE — TELEPHONE ENCOUNTER
----- Message from Jose M Newman MD sent at 2/6/2020  2:01 PM EST -----  Please inform patient that Chest CT scan showed a lung nodule 5mm on the left lung. Most nodules do not cause any harm, but needs to be monitored. Recommended to have a repeat CT scan in 6 months (ordered). Advise to keep appointment in April 2020.

## 2020-02-07 NOTE — TELEPHONE ENCOUNTER
Spoke with patient informed him about CT chest results showing 5 mm nodule, likely harmless- and to repeat it in 6 months. He verbalized understanding.

## 2020-02-10 ENCOUNTER — HOSPITAL ENCOUNTER (OUTPATIENT)
Dept: PREADMISSION TESTING | Age: 60
Discharge: HOME | End: 2020-02-10
Payer: MEDICARE

## 2020-02-10 ENCOUNTER — OFFICE VISIT (OUTPATIENT)
Dept: CARDIOLOGY CLINIC | Age: 60
End: 2020-02-10

## 2020-02-10 VITALS
WEIGHT: 207 LBS | OXYGEN SATURATION: 81 % | SYSTOLIC BLOOD PRESSURE: 124 MMHG | BODY MASS INDEX: 32.42 KG/M2 | HEART RATE: 110 BPM | DIASTOLIC BLOOD PRESSURE: 79 MMHG

## 2020-02-10 DIAGNOSIS — R94.39 ABNORMAL STRESS TEST: Primary | ICD-10-CM

## 2020-02-10 DIAGNOSIS — R94.39 ABNORMAL STRESS TEST: ICD-10-CM

## 2020-02-10 LAB
ALBUMIN SERPL-MCNC: 3.1 G/DL (ref 3.4–5)
ALBUMIN/GLOB SERPL: 0.8 {RATIO} (ref 0.8–1.7)
ALP SERPL-CCNC: 83 U/L (ref 45–117)
ALT SERPL-CCNC: 14 U/L (ref 16–61)
ANION GAP SERPL CALC-SCNC: 5 MMOL/L (ref 3–18)
AST SERPL-CCNC: 14 U/L (ref 10–38)
BASOPHILS # BLD: 0 K/UL (ref 0–0.1)
BASOPHILS NFR BLD: 0 % (ref 0–2)
BILIRUB SERPL-MCNC: 0.6 MG/DL (ref 0.2–1)
BUN SERPL-MCNC: 16 MG/DL (ref 7–18)
BUN/CREAT SERPL: 14 (ref 12–20)
CALCIUM SERPL-MCNC: 9.4 MG/DL (ref 8.5–10.1)
CHLORIDE SERPL-SCNC: 101 MMOL/L (ref 100–111)
CO2 SERPL-SCNC: 30 MMOL/L (ref 21–32)
CREAT SERPL-MCNC: 1.18 MG/DL (ref 0.6–1.3)
DIFFERENTIAL METHOD BLD: ABNORMAL
EOSINOPHIL # BLD: 0.1 K/UL (ref 0–0.4)
EOSINOPHIL NFR BLD: 2 % (ref 0–5)
ERYTHROCYTE [DISTWIDTH] IN BLOOD BY AUTOMATED COUNT: 16.2 % (ref 11.6–14.5)
GLOBULIN SER CALC-MCNC: 4 G/DL (ref 2–4)
GLUCOSE SERPL-MCNC: 125 MG/DL (ref 74–99)
HCT VFR BLD AUTO: 48.7 % (ref 36–48)
HGB BLD-MCNC: 15 G/DL (ref 13–16)
INR PPP: 1.1 (ref 0.8–1.2)
LYMPHOCYTES # BLD: 2.6 K/UL (ref 0.9–3.6)
LYMPHOCYTES NFR BLD: 36 % (ref 21–52)
MCH RBC QN AUTO: 28.3 PG (ref 24–34)
MCHC RBC AUTO-ENTMCNC: 30.8 G/DL (ref 31–37)
MCV RBC AUTO: 91.9 FL (ref 74–97)
MONOCYTES # BLD: 0.5 K/UL (ref 0.05–1.2)
MONOCYTES NFR BLD: 7 % (ref 3–10)
NEUTS SEG # BLD: 4 K/UL (ref 1.8–8)
NEUTS SEG NFR BLD: 55 % (ref 40–73)
PLATELET # BLD AUTO: 275 K/UL (ref 135–420)
PMV BLD AUTO: 11.3 FL (ref 9.2–11.8)
POTASSIUM SERPL-SCNC: 4.7 MMOL/L (ref 3.5–5.5)
PROT SERPL-MCNC: 7.1 G/DL (ref 6.4–8.2)
PROTHROMBIN TIME: 13.7 SEC (ref 11.5–15.2)
RBC # BLD AUTO: 5.3 M/UL (ref 4.7–5.5)
SODIUM SERPL-SCNC: 136 MMOL/L (ref 136–145)
WBC # BLD AUTO: 7.2 K/UL (ref 4.6–13.2)

## 2020-02-10 PROCEDURE — 85025 COMPLETE CBC W/AUTO DIFF WBC: CPT

## 2020-02-10 PROCEDURE — 36415 COLL VENOUS BLD VENIPUNCTURE: CPT

## 2020-02-10 PROCEDURE — 80053 COMPREHEN METABOLIC PANEL: CPT

## 2020-02-10 PROCEDURE — 85610 PROTHROMBIN TIME: CPT

## 2020-02-10 RX ORDER — SODIUM CHLORIDE 0.9 % (FLUSH) 0.9 %
5-40 SYRINGE (ML) INJECTION AS NEEDED
Status: CANCELLED | OUTPATIENT
Start: 2020-02-10

## 2020-02-10 RX ORDER — SODIUM CHLORIDE 9 MG/ML
1000 INJECTION, SOLUTION INTRAVENOUS CONTINUOUS
Status: CANCELLED | OUTPATIENT
Start: 2020-02-10

## 2020-02-10 RX ORDER — SODIUM CHLORIDE 0.9 % (FLUSH) 0.9 %
5-40 SYRINGE (ML) INJECTION EVERY 8 HOURS
Status: CANCELLED | OUTPATIENT
Start: 2020-02-10

## 2020-02-10 RX ORDER — ASPIRIN 325 MG
162 TABLET ORAL DAILY
Status: CANCELLED | OUTPATIENT
Start: 2020-02-10

## 2020-02-10 NOTE — PATIENT INSTRUCTIONS
Instructions    Patients Name:      1. You are scheduled to have a heart cath on February 19, 2020  at 10:00am Please check in at 8:00am.      2. Please go to [facility], where the Emergency room is, and park in the outpatient parking lot. Once you enter, go to the 2nd floor and check in with the  there. The  will either give you directions or assist you in getting to the cath holding area. 3. You are not to eat or drink anything after midnight the morning of the procedure. 4. If you are diabetic, do not take your insulin/sugar pill the morning of the procedure. 5. MEDICATION INSTRUCTIONS:   Please take your morning medications with a small sip of water: DO NOT TAKE LASIX DAY OF PROCEDURE       [x]          Please make sure to take your Blood pressure medication . [x]          Take your Aspirin and/or Plavix. [x]          Stop your Glucophage (Metformin) on  February 17, 2020 and do not resume it until after you have the blood work after the procedure. Pre -procedure LABWORK is to be done within one week of your procedure date      6. We encourage families to wait in the waiting room on the first floor while the procedure is being done. The Doctor will come out and talk with you as soon as the procedure is over. 7. There is the possibility that you may spend the night in the hospital, depending on the results of the procedure. This will be determined after the procedure is done. If angioplasty or stent is planned, you will stay at least one day. 8. If you or your family have any questions, please call our office Monday -Friday, 9:00 a.m.-4:30 p.m.,  At [phone], and ask to speak to one of the nurses. 9. Be sure you have someone to drive you home, you will not be able to drive after the procedure.       Shruthi will be calling to verify address prior to shipping you the heart monitor

## 2020-02-10 NOTE — PROGRESS NOTES
[facility]    Mr. Sun Baxter is a 64 y.o. male with a history of COPD, diabetes, hypertension, hyperlipidemia, sleep apnea and pulmonary hypertension    Patient is here today for follow-up appointment. He continues to have dyspnea with exertion. Occasionally he had chest pain. For that reason he underwent nuclear stress test and echocardiogram.  Since last visit, he does not have any chest pain. He continues to have dyspnea. He is using 2 pillows at night. He has occasional lower extremity swelling  Denies any nausea, vomiting, abdominal pain, fever, chills, sputum production. No hematuria or other bleeding complaints    Past Medical History:   Diagnosis Date    Back pain     Cardiomyopathy ([facility]     LVEF 45% (2019)    Chronic obstructive pulmonary disease (HCC)     Diabetes ([facility]     Hypercholesterolemia     Hypertension     Pulmonary hypertension ([facility]     Sleep apnea     Does not use CPAP    Tobacco abuse          Past Surgical History:   Procedure Laterality Date    COLONOSCOPY N/A 1/23/2017    COLONOSCOPY performed by Kvng Finnegan MD at [address] [place] [address]  2003    umbilacal     HX SHOULDER ARTHROSCOPY  2004    bilateral       Current Outpatient Medications   Medication Sig    fluticasone-umeclidinium-vilanterol (TRELEGY ELLIPTA) 100-62.5-25 mcg inhaler Take 1 Puff by inhalation daily.  atorvastatin (LIPITOR) 40 mg tablet Take 1 Tab by mouth daily.  cyclobenzaprine (FLEXERIL) 10 mg tablet Take  by mouth three (3) times daily as needed for Muscle Spasm(s).  amitriptyline (ELAVIL) 50 mg tablet Take 1 Tab by mouth nightly.  furosemide (LASIX) 40 mg tablet Take 1 Tab by mouth daily.  albuterol (PROVENTIL HFA, VENTOLIN HFA, PROAIR HFA) 90 mcg/actuation inhaler Take 2 Puffs by inhalation every four (4) hours as needed for Wheezing or Shortness of Breath.     cetirizine (ZYRTEC) 10 mg tablet TAKE 1 TABLET BY MOUTH ONCE DAILY    carvedilol (COREG) 6.25 mg tablet Take 1 Tab by mouth two (2) times daily (with meals).  aspirin 81 mg chewable tablet Take 1 Tab by mouth daily.  losartan (COZAAR) 50 mg tablet Take 1 Tab by mouth daily.  gabapentin (NEURONTIN) 300 mg capsule Take 1 Cap by mouth four (4) times daily. (Patient taking differently: Take 300 mg by mouth two (2) times a day.)    carboxymethylcellulose sodium (REFRESH TEARS) 0.5 % drop ophthalmic solution Administer 1 Drop to both eyes two (2) times a day.  metFORMIN (GLUCOPHAGE) 1,000 mg tablet Take 1,000 mg by mouth two (2) times daily (with meals).  glipiZIDE (GLUCOTROL) 10 mg tablet Take 10 mg by mouth three (3) times daily.  amLODIPine (NORVASC) 10 mg tablet Take 10 mg by mouth daily.  clobetasol (TEMOVATE) 0.05 % ointment Apply  to affected area two (2) times a day.  cholecalciferol (VITAMIN D3) 1,000 unit tablet Take 1,000 Units by mouth daily. No current facility-administered medications for this visit. Allergies and Sensitivities:  Allergies   Allergen Reactions    Lisinopril Swelling       Family History:  Family History   Problem Relation Age of Onset   Nils Dannie Cancer Mother     Hypertension Mother     Heart Disease Mother     Diabetes Mother     No Known Problems Father        Social History:  Social History     Tobacco Use    Smoking status: Current Every Day Smoker     Packs/day: 1.00     Years: 35.00     Pack years: 35.00     Types: Cigarettes     Start date: 4/9/1986    Smokeless tobacco: Former User     Quit date: 3/16/2016   Substance Use Topics    Alcohol use: Yes     Alcohol/week: 4.0 standard drinks     Types: 4 Shots of liquor per week    Drug use: No     He  reports that he has been smoking cigarettes. He started smoking about 33 years ago. He has a 35.00 pack-year smoking history. He quit smokeless tobacco use about 3 years ago.   He  reports current alcohol use of about 4.0 standard drinks of alcohol per week. Review of Systems:  Cardiac symptoms as noted above in HPI. All others negative. Denies fatigue, malaise, skin rash, joint pain, blurring vision, photophobia, neck pain, hemoptysis, chronic cough, nausea, vomiting, hematuria, burning micturition, BRBPR, chronic headaches. Physical Exam:  BP Readings from Last 3 Encounters:   02/10/20 124/79   01/28/20 122/85   01/28/20 (!) 149/96         Pulse Readings from Last 3 Encounters:   02/10/20 (!) 110   01/23/20 (!) 123   01/20/20 89          Wt Readings from Last 3 Encounters:   02/10/20 207 lb (93.9 kg)   02/05/20 194 lb 0.1 oz (88 kg)   01/28/20 199 lb (90.3 kg)       Constitutional: Oriented to person, place, and time. HENT: Head: Normocephalic and atraumatic. Neck: No JVD present. Carotid bruit is not appreciated. Cardiovascular: Regular rhythm. No murmur, gallop or rubs appreciated  Lung: Breath sounds normal. No respiratory distress. No ronchi or rales appreciated  Abdominal: No tenderness. No rebound and no guarding. Musculoskeletal: There is no lower extremity edema. No cynosis    Review of Data  LABS:   Lab Results   Component Value Date/Time    Sodium 136 01/20/2020 12:55 PM    Potassium 4.6 01/20/2020 12:55 PM    Chloride 99 (L) 01/20/2020 12:55 PM    CO2 33 (H) 01/20/2020 12:55 PM    Glucose 132 (H) 01/20/2020 12:55 PM    BUN 21 (H) 01/20/2020 12:55 PM    Creatinine 1.35 (H) 01/20/2020 12:55 PM     Lipids Latest Ref Rng & Units 1/20/2020 1/22/2019 9/11/2018 5/8/2018 12/22/2017   Chol, Total <200 MG/ 194 194 195 187   HDL 40 - 60 MG/DL 37(L) 46 37(L) 51 58   LDL 0 - 100 MG/. 6(H) 109. 2(H) 82.8 98.4 90.6   Trig <150 MG/(H) 194(H) 371(H) 228(H) 192(H)   Chol/HDL Ratio 0 - 5.0   4.9 4.2 5.2(H) 3.8 3.2   Some recent data might be hidden     Lab Results   Component Value Date/Time    ALT (SGPT) 15 (L) 01/20/2020 12:55 PM     Lab Results   Component Value Date/Time    Hemoglobin A1c 7.4 (H) 01/20/2020 12:55 PM    Hemoglobin A1c (POC) 6.7 06/25/2019 10:24 AM       EKG    ECHO (01/20)  Left Ventricle Normal cavity size, wall thickness and diastolic function. Mild-to-moderate systolic dysfunction. The estimated ejection fraction is 40 - 45%. Visually measured ejection fraction. Global hypokinesis observed. Wall Scoring The left ventricular wall motion is globally hypokinetic. Left Atrium Normal cavity size. Left Atrium volume index is 28.44 mL/m2. Right Ventricle Normal global systolic function. Severely dilated right ventricle. Abnormal septal motion. Diastolic flattening of interventricular septum consistent with right ventricle volume overload. Pacing wire present . Right Atrium Moderately dilated right atrium. Pacemaker wire present in the right atrium. Aortic Valve Trileaflet valve structure, no stenosis and no regurgitation. Mitral Valve No stenosis. Mitral valve non-specific thickening. Trace regurgitation. Tricuspid Valve No stenosis. Dilated annulus. Moderate regurgitation. Pulmonary arterial systolic pressure is 23.4 mmHg. Moderate to severe pulmonary hypertension. Pulmonic Valve Normal valve structure and no stenosis. Trace regurgitation. STRESS TEST (01/20)  · Baseline ECG: Normal sinus rhythm. · Gated SPECT: Left ventricular function post-stress was abnormal. Calculated ejection fraction is 38%. · Myocardial perfusion imaging defect 1: There is a defect that is small to moderate in size present in the inferior location(s) that is predominantly fixed. The possibility of artifact and infarction cannot be excluded. · There was no convincing evidence of significant reversible defect to suggest on going major ischemia.   · Myocardial perfusion imaging supports an intermediate risk stress test.    CATHETERIZATION    IMPRESSION & PLAN:  Mr. Jevon Calixto is 60-year male with a history of cardiomyopathy, hypertension, hyperlipidemia, diabetes, sleep apnea, pulmonary hypertension    Cardiomyopathy:  LVEF 45-50% in March 2019  Last echo with EF 40-45% in January 2020   Currently on Coreg and losartan. NYHA class II symptoms. Because of abnormal stress test and ongoing symptoms, recommend left heart catheterization along with right heart catheterization because of pulmonary hypertension  DW patient regarding management strategy which includes medical management vs. Ischemia evaluation ( non-invasive vs. Invasive). Risk, benefit and alternatives of each strategy discussed in detail. Risk, benefit, complication of LHC , RHC and possible PCI ( including but not limited to bleeding, vascular trauma requiring surgery,  infection, heart failure, stroke, MI, emergent bypass surgery, severe allergic reactions, kidney failure, dialysis and death ) were discussed with patient and wife and they are willing to proceed with procedure. Will be using moderate sedation    Chest pain /dyspnea:  Because of abnormal stress test and ongoing symptoms, recommend left heart catheterization along with right heart catheterization because of pulmonary hypertension  DW patient regarding management strategy which includes medical management vs. Ischemia evaluation ( non-invasive vs. Invasive). Risk, benefit and alternatives of each strategy discussed in detail. Risk, benefit, complication of LHC , RHC and possible PCI ( including but not limited to bleeding, vascular trauma requiring surgery,  infection, heart failure, stroke, MI, emergent bypass surgery, severe allergic reactions, kidney failure, dialysis and death ) were discussed with patient and wife and they are willing to proceed with procedure. Will be using moderate sedation    Diabetes:  Goal hemoglobin A1c less than 7.5 is recommended from cardiovascular standpoint. Defer to PCP    Hyperlipidemia:  Last . Goal LDL 70 discussed with patient. Currently on atorvastatin 40.   If this is not at goal in next 6-month, would recommend increasing dose    Hypertension:  Continue current medication    COPD/pulmonary hypertension:  Defer to Dr. Shanice Harrington    Tobacco abuse:  Still smoke 8/10 cig a day. He is working towards quit smoking    This plan was discussed with patient who is in agreement. Thank you for allowing me to participate in patient care. Please feel free to call me if you have any question or concern. Niki Wayne MD  Please note: This document has been produced using voice recognition software. Unrecognized errors in transcription may be present.

## 2020-02-10 NOTE — PROGRESS NOTES
1. Have you been to the ER, urgent care clinic since your last visit? Hospitalized since your last visit? No    2. Have you seen or consulted any other health care providers outside of the [address] [place] [address] since your last visit? Include any pap smears or colon screening.  No

## 2020-02-14 ENCOUNTER — HOSPITAL ENCOUNTER (OUTPATIENT)
Dept: CT IMAGING | Age: 60
Discharge: HOME | End: 2020-02-14
Attending: INTERNAL MEDICINE
Payer: MEDICARE

## 2020-02-14 DIAGNOSIS — R91.1 PULMONARY NODULE, LEFT: ICD-10-CM

## 2020-02-14 PROCEDURE — 71250 CT THORAX DX C-: CPT

## 2020-02-19 ENCOUNTER — HOSPITAL ENCOUNTER (OUTPATIENT)
Age: 60
Setting detail: OUTPATIENT SURGERY
Discharge: HOME | End: 2020-02-19
Attending: INTERNAL MEDICINE | Admitting: INTERNAL MEDICINE
Payer: MEDICARE

## 2020-02-19 VITALS
HEIGHT: 69 IN | DIASTOLIC BLOOD PRESSURE: 90 MMHG | BODY MASS INDEX: 29.44 KG/M2 | OXYGEN SATURATION: 96 % | RESPIRATION RATE: 17 BRPM | TEMPERATURE: 98.1 F | WEIGHT: 198.8 LBS | HEART RATE: 100 BPM | SYSTOLIC BLOOD PRESSURE: 142 MMHG

## 2020-02-19 DIAGNOSIS — R94.39 ABNORMAL NUCLEAR STRESS TEST: ICD-10-CM

## 2020-02-19 LAB
ACT BLD: 164 SECS (ref 79–138)
ACT BLD: 241 SECS (ref 79–138)
GLUCOSE BLD STRIP.AUTO-MCNC: 130 MG/DL (ref 70–110)
GLUCOSE BLD STRIP.AUTO-MCNC: 168 MG/DL (ref 70–110)

## 2020-02-19 PROCEDURE — C9754 PERC AV FISTULA, DIRECT: HCPCS

## 2020-02-19 PROCEDURE — 74011636320 HC RX REV CODE- 636/320: Performed by: INTERNAL MEDICINE

## 2020-02-19 PROCEDURE — 77030012597: Performed by: INTERNAL MEDICINE

## 2020-02-19 PROCEDURE — 77030029997 HC DEV COM RDL R BND TELE -B: Performed by: INTERNAL MEDICINE

## 2020-02-19 PROCEDURE — 77030013797 HC KT TRNSDUC PRSSR EDWD -A: Performed by: INTERNAL MEDICINE

## 2020-02-19 PROCEDURE — 74011250637 HC RX REV CODE- 250/637: Performed by: INTERNAL MEDICINE

## 2020-02-19 PROCEDURE — 77030015766: Performed by: INTERNAL MEDICINE

## 2020-02-19 PROCEDURE — 76210000002 HC OR PH I REC 3 TO 3.5 HR: Performed by: INTERNAL MEDICINE

## 2020-02-19 PROCEDURE — 85347 COAGULATION TIME ACTIVATED: CPT

## 2020-02-19 PROCEDURE — C1894 INTRO/SHEATH, NON-LASER: HCPCS | Performed by: INTERNAL MEDICINE

## 2020-02-19 PROCEDURE — C1751 CATH, INF, PER/CENT/MIDLINE: HCPCS | Performed by: INTERNAL MEDICINE

## 2020-02-19 PROCEDURE — 74011000250 HC RX REV CODE- 250: Performed by: INTERNAL MEDICINE

## 2020-02-19 PROCEDURE — 99153 MOD SED SAME PHYS/QHP EA: CPT | Performed by: INTERNAL MEDICINE

## 2020-02-19 PROCEDURE — 82962 GLUCOSE BLOOD TEST: CPT

## 2020-02-19 PROCEDURE — C1769 GUIDE WIRE: HCPCS | Performed by: INTERNAL MEDICINE

## 2020-02-19 PROCEDURE — 77030003629 HC NDL PERC VASC COOK -A: Performed by: INTERNAL MEDICINE

## 2020-02-19 PROCEDURE — 93460 R&L HRT ART/VENTRICLE ANGIO: CPT | Performed by: INTERNAL MEDICINE

## 2020-02-19 PROCEDURE — C1887 CATHETER, GUIDING: HCPCS | Performed by: INTERNAL MEDICINE

## 2020-02-19 PROCEDURE — 74011250636 HC RX REV CODE- 250/636: Performed by: INTERNAL MEDICINE

## 2020-02-19 PROCEDURE — 99152 MOD SED SAME PHYS/QHP 5/>YRS: CPT | Performed by: INTERNAL MEDICINE

## 2020-02-19 PROCEDURE — 77030012468 HC VLV BLEEDBK CNTRL ABBT -B: Performed by: INTERNAL MEDICINE

## 2020-02-19 PROCEDURE — 93571 IV DOP VEL&/PRESS C FLO 1ST: CPT | Performed by: INTERNAL MEDICINE

## 2020-02-19 PROCEDURE — 77030013761 HC KT HRT LFT ANGI -B: Performed by: INTERNAL MEDICINE

## 2020-02-19 RX ORDER — SODIUM CHLORIDE 9 MG/ML
1000 INJECTION, SOLUTION INTRAVENOUS CONTINUOUS
Status: DISCONTINUED | OUTPATIENT
Start: 2020-02-19 | End: 2020-02-19 | Stop reason: HOSPADM

## 2020-02-19 RX ORDER — VERAPAMIL HYDROCHLORIDE 2.5 MG/ML
INJECTION, SOLUTION INTRAVENOUS AS NEEDED
Status: DISCONTINUED | OUTPATIENT
Start: 2020-02-19 | End: 2020-02-19 | Stop reason: HOSPADM

## 2020-02-19 RX ORDER — SODIUM CHLORIDE 0.9 % (FLUSH) 0.9 %
5-40 SYRINGE (ML) INJECTION EVERY 8 HOURS
Status: DISCONTINUED | OUTPATIENT
Start: 2020-02-19 | End: 2020-02-19 | Stop reason: HOSPADM

## 2020-02-19 RX ORDER — SODIUM CHLORIDE 0.9 % (FLUSH) 0.9 %
5-40 SYRINGE (ML) INJECTION AS NEEDED
Status: DISCONTINUED | OUTPATIENT
Start: 2020-02-19 | End: 2020-02-19 | Stop reason: HOSPADM

## 2020-02-19 RX ORDER — ASPIRIN 81 MG/1
162 TABLET ORAL DAILY
Status: DISCONTINUED | OUTPATIENT
Start: 2020-02-19 | End: 2020-02-19 | Stop reason: HOSPADM

## 2020-02-19 RX ORDER — MAGNESIUM SULFATE 100 %
4 CRYSTALS MISCELLANEOUS AS NEEDED
Status: DISCONTINUED | OUTPATIENT
Start: 2020-02-19 | End: 2020-02-19 | Stop reason: HOSPADM

## 2020-02-19 RX ORDER — FENTANYL CITRATE 50 UG/ML
INJECTION, SOLUTION INTRAMUSCULAR; INTRAVENOUS AS NEEDED
Status: DISCONTINUED | OUTPATIENT
Start: 2020-02-19 | End: 2020-02-19 | Stop reason: HOSPADM

## 2020-02-19 RX ORDER — DEXTROSE MONOHYDRATE 100 MG/ML
125-250 INJECTION, SOLUTION INTRAVENOUS AS NEEDED
Status: DISCONTINUED | OUTPATIENT
Start: 2020-02-19 | End: 2020-02-19 | Stop reason: HOSPADM

## 2020-02-19 RX ORDER — MIDAZOLAM HYDROCHLORIDE 1 MG/ML
INJECTION, SOLUTION INTRAMUSCULAR; INTRAVENOUS AS NEEDED
Status: DISCONTINUED | OUTPATIENT
Start: 2020-02-19 | End: 2020-02-19 | Stop reason: HOSPADM

## 2020-02-19 RX ORDER — INSULIN LISPRO 100 [IU]/ML
INJECTION, SOLUTION INTRAVENOUS; SUBCUTANEOUS
Status: DISCONTINUED
Start: 2020-02-19 | End: 2020-02-19 | Stop reason: WASHOUT

## 2020-02-19 RX ORDER — LIDOCAINE HYDROCHLORIDE 10 MG/ML
INJECTION INFILTRATION; PERINEURAL AS NEEDED
Status: DISCONTINUED | OUTPATIENT
Start: 2020-02-19 | End: 2020-02-19 | Stop reason: HOSPADM

## 2020-02-19 RX ORDER — HEPARIN SODIUM 200 [USP'U]/100ML
INJECTION, SOLUTION INTRAVENOUS AS NEEDED
Status: DISCONTINUED | OUTPATIENT
Start: 2020-02-19 | End: 2020-02-19 | Stop reason: HOSPADM

## 2020-02-19 RX ORDER — INSULIN LISPRO 100 [IU]/ML
INJECTION, SOLUTION INTRAVENOUS; SUBCUTANEOUS ONCE
Status: DISCONTINUED | OUTPATIENT
Start: 2020-02-19 | End: 2020-02-19 | Stop reason: HOSPADM

## 2020-02-19 RX ORDER — HEPARIN SODIUM 1000 [USP'U]/ML
INJECTION, SOLUTION INTRAVENOUS; SUBCUTANEOUS AS NEEDED
Status: DISCONTINUED | OUTPATIENT
Start: 2020-02-19 | End: 2020-02-19 | Stop reason: HOSPADM

## 2020-02-19 RX ADMIN — ASPIRIN 81 MG: 81 TABLET, COATED ORAL at 08:50

## 2020-02-19 NOTE — PERIOP NOTES
1225 Patient arrived to PACU. Assumed care. Connected to monitor. VSS. Will continue to monitor    1310 updated patients wife with PACU status. 1550 patient discharged. No changes in assessment. Catheter sites both clean, dry, and intact. No complaints from patient. Education complete and patient stating no questions at this time. Patient states an understanding to follow up with Dr. Eulalio Astudillo office if questions arise.

## 2020-02-19 NOTE — PERIOP NOTES
Patient's blood glucose 168. Called cath lab to see if Dr. Lorie Gilford wanted any insulin coverage. He said no coverage. No insulin was given.

## 2020-02-19 NOTE — H&P
Please see clinic note from  for detail. I saw and examined patient and confirmed above. No interval change. Labs reviewed. Procedure explained to patient and all risk and benefit discussed with patient. Risk, benefit, complication of LHC and possible PCI ( including but not limited to bleeding, infection, heart failure, stroke, MI, emergent bypass surgery, kidney failure, dialysis and death ) were discussed with patient and willing to proceed with procedure. Proceed as planned. History and physical has been reviewed.  There have been no significant clinical changes since the completion of the originally dated History and Physical.  Will be using moderate sedation.    ------------------------------------------------------------------------------------------------------------------

## 2020-02-19 NOTE — DISCHARGE INSTRUCTIONS
Patient Education     DISCHARGE SUMMARY from Nurse    PATIENT INSTRUCTIONS:    After general anesthesia or intravenous sedation, for 24 hours or while taking prescription Narcotics:  · Limit your activities  · Do not drive and operate hazardous machinery  · Do not make important personal or business decisions  · Do  not drink alcoholic beverages  · If you have not urinated within 8 hours after discharge, please contact your surgeon on call. Report the following to your surgeon:  · Excessive pain, swelling, redness or odor of or around the surgical area  · Temperature over 100.5  · Nausea and vomiting lasting longer than 4 hours or if unable to take medications  · Any signs of decreased circulation or nerve impairment to extremity: change in color, persistent  numbness, tingling, coldness or increase pain  · Any questions    What to do at Home:  Recommended activity: Activity as tolerated and no driving for today, No heavy lifting or deep bending movements. Keep dressings on for 24 hours-No shower/baths. If you experience any of the following symptoms cold extremities, bleeding, abnormal changes, please follow up with Dr. Betty Robertsr office. *  Please give a list of your current medications to your Primary Care Provider. *  Please update this list whenever your medications are discontinued, doses are      changed, or new medications (including over-the-counter products) are added. *  Please carry medication information at all times in case of emergency situations. These are general instructions for a healthy lifestyle:    No smoking/ No tobacco products/ Avoid exposure to second hand smoke  Surgeon General's Warning:  Quitting smoking now greatly reduces serious risk to your health.     Obesity, smoking, and sedentary lifestyle greatly increases your risk for illness    A healthy diet, regular physical exercise & weight monitoring are important for maintaining a healthy lifestyle    You may be retaining fluid if you have a history of heart failure or if you experience any of the following symptoms:  Weight gain of 3 pounds or more overnight or 5 pounds in a week, increased swelling in our hands or feet or shortness of breath while lying flat in bed. Please call your doctor as soon as you notice any of these symptoms; do not wait until your next office visit. The discharge information has been reviewed with the patient and spouse. The patient and spouse verbalized understanding. Discharge medications reviewed with the patient and spouse and appropriate educational materials and side effects teaching were provided. ___________________________________________________________________________________________________________________________________     Angiogram: What to Expect at Home  Your Recovery  An angiogram is an X-ray test that uses dye and a camera to take pictures of the blood flow in an artery or a vein. The doctor inserted a thin, flexible tube (catheter) into a blood vessel in your groin. In some cases, the catheter is placed in a blood vessel in the arm. An angiogram is done for many reasons. For example, you may have an angiogram to find the source of bleeding, such as an ulcer. Or it may be done to look for blocked blood vessels in your lungs. After an angiogram, your groin or arm may have a bruise and feel sore for a day or two. You can do light activities around the house but nothing strenuous for several days. Your doctor may give you specific instructions on when you can do your normal activities again, such as driving and going back to work. This care sheet gives you a general idea about how long it will take for you to recover. But each person recovers at a different pace. Follow the steps below to feel better as quickly as possible. How can you care for yourself at home?   Activity    · Do not do strenuous exercise and do not lift, pull, or push anything heavy until your doctor says it is okay. This may be for a day or two. You can walk around the house and do light activity, such as cooking.     · If the catheter was placed in your groin, try not to walk up stairs for the first couple of days.     · If the catheter was placed in your arm near your wrist, do not bend your wrist deeply for the first couple of days. Be careful using your hand to get into and out of a chair or bed.     · If your doctor recommends it, get more exercise. Walking is a good choice. Bit by bit, increase the amount you walk every day. Try for at least 30 minutes on most days of the week. Diet    · Drink plenty of fluids to help your body flush out the dye. If you have kidney, heart, or liver disease and have to limit fluids, talk with your doctor before you increase the amount of fluids you drink.     · You can eat your normal diet. If your stomach is upset, try bland, low-fat foods like plain rice, broiled chicken, toast, and yogurt. Medicines    · Be safe with medicines. Read and follow all instructions on the label. ? If the doctor gave you a prescription medicine for pain, take it as prescribed. ? If you are not taking a prescription pain medicine, ask your doctor if you can take an over-the-counter medicine.     · If you take blood thinners, such as warfarin (Coumadin), clopidogrel (Plavix), or aspirin, be sure to talk to your doctor. He or she will tell you if and when to start taking those medicines again. Make sure that you understand exactly what your doctor wants you to do.     · Your doctor will tell you if and when you can restart your medicines. He or she will also give you instructions about taking any new medicines.    Care of the catheter site    · You will have a dressing over the cut (incision). A dressing helps the incision heal and protects it.  Your doctor will tell you how to take care of this.     · Put ice or a cold pack on the area for 10 to 20 minutes at a time to help with soreness or swelling. Put a thin cloth between the ice and your skin.     · You may shower 24 to 48 hours after the procedure, if your doctor okays it. Pat the incision dry.     · Do not soak the catheter site until it is healed. Don't take a bath for 1 week, or until your doctor tells you it is okay.     · Watch for bleeding from the site. A small amount of blood (up to the size of a quarter) on the bandage can be normal.     · If you are bleeding, lie down and press on the area for 15 minutes to try to make it stop. If the bleeding does not stop, call your doctor or seek immediate medical care. Follow-up care is a key part of your treatment and safety. Be sure to make and go to all appointments, and call your doctor if you are having problems. It's also a good idea to know your test results and keep a list of the medicines you take. When should you call for help? Call 911 anytime you think you may need emergency care. For example, call if:    · You passed out (lost consciousness).     · You have severe trouble breathing.     · You have sudden chest pain and shortness of breath, or you cough up blood.    Call your doctor now or seek immediate medical care if:    · You are bleeding from the area where the catheter was put in your artery.     · You have a fast-growing, painful lump at the catheter site.     · You have signs of infection, such as:  ? Increased pain, swelling, warmth, or redness. ? Red streaks leading from the incision. ? Pus draining from the incision. ? A fever.    Watch closely for any changes in your health, and be sure to contact your doctor if:    · You don't get better as expected. Where can you learn more? Go to http://laurita-venu.info/. Enter A940 in the search box to learn more about \"Angiogram: What to Expect at Home. \"  Current as of: March 28, 2019  Content Version: 12.2  © 5780-1809 [employer].  Care instructions adapted under license by [employer] (which disclaims liability or warranty for this information). If you have questions about a medical condition or this instruction, always ask your healthcare professional. [address] any warranty or liability for your use of this information.

## 2020-02-19 NOTE — Clinical Note
Right groin and right radial clipped, prepped with ChloraPrep and draped. Wet prep solution applied at: 1028. Wet prep solution dried at: 1031. Wet prep elapsed drying time: 3 mins.

## 2020-02-19 NOTE — Clinical Note
TRANSFER - OUT REPORT:     Verbal report given to: Aiden Gee RN. Report consisted of patient's Situation, Background, Assessment and   Recommendations(SBAR). Opportunity for questions and clarification was provided. Patient transported with a Cardiac Cath Tech / Patient Care Tech. Patient transported to: PACU.

## 2020-02-20 NOTE — PERIOP NOTES
Clinical Pharmacy Services: Medication History    Yoni Bonner Jr. is a 88 y.o. male presenting to [facility] for   Chief Complaint   Patient presents with    Chest Pain       He  has a past medical history of Bronchitis, CAD (coronary artery disease), Carotid arterial disease (CMS/[facility]), Chronic bronchitis (CMS/[facility]), COPD (chronic obstructive pulmonary disease) (CMS/[facility]), Gout, Hyperlipidemia, Hypertension, Ischemic cardiomyopathy, Mild aortic stenosis, Rheumatic fever, Syncopal episodes, Tobacco use, and Type 2 diabetes mellitus (CMS/[facility]).    Allergies as of 02/20/2020 - Reviewed 02/20/2020   Allergen Reaction Noted    No known drug allergy Unknown (See Comments) 02/04/2015       Medication information was obtained from: spouse and med list  Pharmacy and Phone Number:     Prior to Admission Medications       Prescriptions Last Dose Informant Patient Reported? Taking?    albuterol (PROVENTIL) (2.5 MG/3ML) 0.083% nebulizer solution  Spouse/Significant Other No Yes    Take 2.5 mg by nebulization Every 4 (Four) Hours As Needed for Wheezing or Shortness of Air.    albuterol sulfate  (90 Base) MCG/ACT inhaler  Spouse/Significant Other Yes Yes    Inhale 2 puffs Every 4 (Four) Hours As Needed for Wheezing.    aspirin 81 MG EC tablet  Spouse/Significant Other No Yes    Take 1 tablet by mouth Daily.    atorvastatin (LIPITOR) 10 MG tablet  Spouse/Significant Other Yes Yes    Take 10 mg by mouth Every Night.    carvedilol (COREG) 3.125 MG tablet  Spouse/Significant Other No Yes    Take 1 tablet by mouth Every 12 (Twelve) Hours.    clopidogrel (PLAVIX) 75 MG tablet  Spouse/Significant Other Yes Yes    Take 75 mg by mouth Daily.    isosorbide mononitrate (IMDUR) 30 MG 24 hr tablet  Spouse/Significant Other Yes Yes    Take 30 mg by mouth Daily.    lisinopril (PRINIVIL,ZESTRIL) 5 MG tablet  Spouse/Significant Other Yes Yes    Take 5 mg by mouth Daily.    nitroglycerin (NITROSTAT) 0.4 MG SL tablet   Pre-Op Summary    Pt arrived via car with family/friend and is oriented to time, place, person and situation. Patient with steady gait with no assistive devices. Visit Vitals  /82 (BP 1 Location: Right arm, BP Patient Position: At rest)   Pulse (!) 109   Temp 97.6 °F (36.4 °C)   Resp 18   Ht 5' 9\" (1.753 m)   Wt 90.2 kg (198 lb 12.8 oz)   SpO2 94%   BMI 29.36 kg/m²       Peripheral IV located on Left hand and right Antecubital.     Patients belongings are located with family. Patient's point of contact is his wife Karen Pagan and their contact number is: [phone]. They will be in the waiting room. They are able to receive medication information. They will be their ride home. Spouse/Significant Other No Yes    1 under the tongue as needed for angina, may repeat q5mins for up three doses                Medication notes:     This medication list is complete to the best of my knowledge as of 2/20/2020    Please call if questions.    Ivis Cole [facility]  Medication History Technician  [phone]    2/20/2020 5:27 PM

## 2020-04-14 ENCOUNTER — TELEPHONE (OUTPATIENT)
Dept: FAMILY MEDICINE CLINIC | Age: 60
End: 2020-04-14

## 2020-04-14 DIAGNOSIS — I10 ESSENTIAL HYPERTENSION: Primary | ICD-10-CM

## 2020-04-14 RX ORDER — HYDROCHLOROTHIAZIDE 25 MG/1
25 TABLET ORAL DAILY
Qty: 90 TAB | Refills: 1 | Status: SHIPPED | OUTPATIENT
Start: 2020-04-14 | End: 2020-04-27 | Stop reason: SDUPTHER

## 2020-04-14 NOTE — TELEPHONE ENCOUNTER
HCTZ refilled. Please inform patient that repeat CT of the chest was unchanged. He was actually supposed to get the repeat CT scan 6 months after the first one, but it was done after 9 days instead (?). Will repeat chest CT in 6 months.

## 2020-04-14 NOTE — TELEPHONE ENCOUNTER
Pt is asking to get a refill for hydrozhlorot  25mg water pill. Medication was last prescribed by Vishnu Gamez. Pt was last seen on 1/2020. Pt is also requesting a call back regarding his CT scan result.

## 2020-04-16 NOTE — TELEPHONE ENCOUNTER
Nurse spoke to patient, advised refill completed and for patient to get CT done in 6 months. Pt verbalized understanding. This encounter will be closed.

## 2020-04-27 ENCOUNTER — TELEPHONE (OUTPATIENT)
Dept: FAMILY MEDICINE CLINIC | Age: 60
End: 2020-04-27

## 2020-04-27 DIAGNOSIS — I10 ESSENTIAL HYPERTENSION: ICD-10-CM

## 2020-04-27 RX ORDER — HYDROCHLOROTHIAZIDE 25 MG/1
25 TABLET ORAL DAILY
Qty: 90 TAB | Refills: 1 | Status: SHIPPED | OUTPATIENT
Start: 2020-04-27 | End: 2020-05-28

## 2020-04-27 NOTE — TELEPHONE ENCOUNTER
Medication sent to [place] [facility] on 4/14/20.  I Resent to Emerick Fabry via verbal with readback

## 2020-04-27 NOTE — TELEPHONE ENCOUNTER
Pt would like for his hydrochlorothiazide to be sent to [facility] on [address] [place]. Asking for medication to be sent again.

## 2020-05-28 ENCOUNTER — VIRTUAL VISIT (OUTPATIENT)
Dept: FAMILY MEDICINE CLINIC | Age: 60
End: 2020-05-28

## 2020-05-28 ENCOUNTER — OFFICE VISIT (OUTPATIENT)
Dept: CARDIOLOGY CLINIC | Age: 60
End: 2020-05-28

## 2020-05-28 VITALS
HEIGHT: 69 IN | SYSTOLIC BLOOD PRESSURE: 118 MMHG | OXYGEN SATURATION: 88 % | WEIGHT: 208.6 LBS | BODY MASS INDEX: 30.9 KG/M2 | DIASTOLIC BLOOD PRESSURE: 78 MMHG | HEART RATE: 95 BPM | TEMPERATURE: 98.5 F | RESPIRATION RATE: 18 BRPM

## 2020-05-28 DIAGNOSIS — E11.40 TYPE 2 DIABETES MELLITUS WITH DIABETIC NEUROPATHY, WITHOUT LONG-TERM CURRENT USE OF INSULIN (HCC): ICD-10-CM

## 2020-05-28 DIAGNOSIS — E78.00 HYPERCHOLESTEREMIA: ICD-10-CM

## 2020-05-28 DIAGNOSIS — I10 ESSENTIAL HYPERTENSION: ICD-10-CM

## 2020-05-28 DIAGNOSIS — J43.9 PULMONARY EMPHYSEMA, UNSPECIFIED EMPHYSEMA TYPE (HCC): ICD-10-CM

## 2020-05-28 DIAGNOSIS — R55 SYNCOPE, UNSPECIFIED SYNCOPE TYPE: Primary | ICD-10-CM

## 2020-05-28 DIAGNOSIS — I42.9 CARDIOMYOPATHY, UNSPECIFIED TYPE (HCC): ICD-10-CM

## 2020-05-28 NOTE — PATIENT INSTRUCTIONS
STOP HYDROCHLOROTHIAZIDE Orders Event Monitor- if previous can not be found will order another preventice study CT of head without contrast - Please call central scheduling at [phone] All testing/lab work is completed at [facility] [address] **Call office three days after testing for results**

## 2020-05-28 NOTE — PROGRESS NOTES
Ronny Cuevas presents today for   Chief Complaint   Patient presents with    Dizziness    Syncope    Hypertension    Follow-up    Shortness of Breath       Ronny Cuevas preferred language for health care discussion is english/other. Is someone accompanying this pt? Yes, spouse    Is the patient using any DME equipment during [address]? n    Depression Screening:  3 most recent PHQ Screens 2/10/2020   Little interest or pleasure in doing things Not at all   Feeling down, depressed, irritable, or hopeless Not at all   Total Score PHQ 2 0       Learning Assessment:  Learning Assessment 7/12/2018   PRIMARY LEARNER Patient   HIGHEST LEVEL OF EDUCATION - PRIMARY LEARNER  GRADUATED HIGH SCHOOL OR GED   BARRIERS PRIMARY LEARNER NONE   CO-LEARNER CAREGIVER No   PRIMARY LANGUAGE ENGLISH   LEARNER PREFERENCE PRIMARY LISTENING     READING     DEMONSTRATION   ANSWERED BY patient   RELATIONSHIP SELF       Abuse Screening:  Abuse Screening Questionnaire 7/12/2018   Do you ever feel afraid of your partner? N   Are you in a relationship with someone who physically or mentally threatens you? N   Is it safe for you to go home? Y       Fall Risk  No flowsheet data found. Pt currently taking Anticoagulant therapy? n    Coordination of Care:  1. Have you been to the ER, urgent care clinic since your last visit? Hospitalized since your last visit? n    2. Have you seen or consulted any other health care providers outside of the [address] [place] [address] since your last visit?  Include any pap smears or colon screening. y

## 2020-05-28 NOTE — PROGRESS NOTES
Tre Howell is a 64 y.o. male who was seen by synchronous (real-time) audio-video technology using doxy. me on 5/28/2020. Location of the patient: Home    Location of the provider: [facility]    Consent:  He and/or health care decision maker is aware that that he may receive a bill for this telehealth service, depending on his insurance coverage, and has provided verbal consent to proceed: Yes    Subjective:   Tre Howell is a 64 y.o. male who presents today for management of    Chief Complaint   Patient presents with    Loss of Consciousness       Syncope  Patient complains of syncope. Onset was 1 year ago, with worsening course since that time. Patient describes the episode as episode witnessed and the following was observed: sudden loss of consciousness without abnormal movements and mental status was normal immediately upon regaining consciousness. The patient denies general feeling of lightheadedness, visual aura, headache, tachycardia/palpitations, nausea, excessive thirst.    Diabetes Mellitus:  He has diabetes mellitus, and  hypertension, hyperlipidemia and cardiomyopathy EF 46-50%. Diabetic ROS - medication compliance: compliant all of the time, diabetic diet compliance: compliant all of the time, home glucose monitoring: values are usually normal, further diabetic ROS: no polyuria or polydipsia, no chest pain, dyspnea or TIA's, no numbness, tingling or pain in extremities. Lab review: orders written for new lab studies as appropriate; see orders. Cardiovascular Review:  Diet and Lifestyle: generally follows a low fat low cholesterol diet, generally follows a low sodium diet, sedentary, smoker 1 cigarette every 3 days  Home BP Monitoring: is not measured at home. Pertinent ROS: taking medications as instructed, no medication side effects noted, no TIA's, no chest pain on exertion, notes stable dyspnea on exertion, no change, no swelling of ankles. Problem List  Patient Active Problem List    Diagnosis Date Noted    Pulmonary nodule, left 02/06/2020    Primary insomnia 01/20/2020    Degeneration of intervertebral disc of lumbar region 10/22/2019    Cigarette nicotine dependence without complication 54/11/7593    Spinal stenosis of lumbar region 06/25/2019    Cardiomyopathy ([facility] 04/23/2019    Other proteinuria 03/07/2019    Type 2 diabetes mellitus with diabetic neuropathy ([facility] 02/09/2018    Clubbing of nail 03/23/2016    Chronic obstructive pulmonary disease ([facility] 03/23/2016    JAIMEE (obstructive sleep apnea) 09/17/2015    Midline low back pain without sciatica 09/03/2015    Essential hypertension 09/03/2015    Hypercholesteremia 09/03/2015    Hip pain 09/03/2015       Current Medications  Current Outpatient Medications   Medication Sig    hydroCHLOROthiazide (HYDRODIURIL) 25 mg tablet Take 1 Tab by mouth daily.  fluticasone-umeclidinium-vilanterol (TRELEGY ELLIPTA) 100-62.5-25 mcg inhaler Take 1 Puff by inhalation daily.  atorvastatin (LIPITOR) 40 mg tablet Take 1 Tab by mouth daily.  cyclobenzaprine (FLEXERIL) 10 mg tablet Take  by mouth three (3) times daily as needed for Muscle Spasm(s).  amitriptyline (ELAVIL) 50 mg tablet Take 1 Tab by mouth nightly.  furosemide (LASIX) 40 mg tablet Take 1 Tab by mouth daily.  albuterol (PROVENTIL HFA, VENTOLIN HFA, PROAIR HFA) 90 mcg/actuation inhaler Take 2 Puffs by inhalation every four (4) hours as needed for Wheezing or Shortness of Breath.  cetirizine (ZYRTEC) 10 mg tablet TAKE 1 TABLET BY MOUTH ONCE DAILY    carvedilol (COREG) 6.25 mg tablet Take 1 Tab by mouth two (2) times daily (with meals).  aspirin 81 mg chewable tablet Take 1 Tab by mouth daily.  losartan (COZAAR) 50 mg tablet Take 1 Tab by mouth daily.  gabapentin (NEURONTIN) 300 mg capsule Take 1 Cap by mouth four (4) times daily.  (Patient taking differently: Take 300 mg by mouth two (2) times a day.)  carboxymethylcellulose sodium (REFRESH TEARS) 0.5 % drop ophthalmic solution Administer 1 Drop to both eyes two (2) times a day.  clobetasol (TEMOVATE) 0.05 % ointment Apply  to affected area two (2) times a day.  cholecalciferol (VITAMIN D3) 1,000 unit tablet Take 1,000 Units by mouth daily.  metFORMIN (GLUCOPHAGE) 1,000 mg tablet Take 1,000 mg by mouth two (2) times daily (with meals).  glipiZIDE (GLUCOTROL) 10 mg tablet Take 10 mg by mouth three (3) times daily.  amLODIPine (NORVASC) 10 mg tablet Take 10 mg by mouth daily. No current facility-administered medications for this visit. Allergies/Drug Reactions  Allergies   Allergen Reactions    Lisinopril Swelling        Social History  Social History     Socioeconomic History    Marital status:      Spouse name: Not on file    Number of children: Not on file    Years of education: Not on file    Highest education level: Not on file   Occupational History    Not on file   Social Needs    Financial resource strain: Not on file    Food insecurity     Worry: Not on file     Inability: Not on file    Transportation needs     Medical: Not on file     Non-medical: Not on file   Tobacco Use    Smoking status: Current Every Day Smoker     Packs/day: 0.50     Years: 35.00     Pack years: 17.50     Types: Cigarettes     Start date: 4/9/1986    Smokeless tobacco: Former User     Quit date: 3/16/2016    Tobacco comment: in process of quitting    Substance and Sexual Activity    Alcohol use:  Yes     Alcohol/week: 4.0 standard drinks     Types: 4 Shots of liquor per week    Drug use: No    Sexual activity: Yes     Partners: Female   Lifestyle    Physical activity     Days per week: Not on file     Minutes per session: Not on file    Stress: Not on file   Relationships    Social connections     Talks on phone: Not on file     Gets together: Not on file     Attends Alevism service: Not on file     Active member of club or organization: Not on file     Attends meetings of clubs or organizations: Not on file     Relationship status: Not on file    Intimate partner violence     Fear of current or ex partner: Not on file     Emotionally abused: Not on file     Physically abused: Not on file     Forced sexual activity: Not on file   Other Topics Concern    Not on file   Social History Narrative    Not on file       Review of Systems  Review of Systems   Constitutional: Negative. HENT: Negative. Respiratory: Positive for shortness of breath. Cardiovascular: Negative. Gastrointestinal: Negative. Genitourinary: Negative. Musculoskeletal: Negative. Neurological: Positive for loss of consciousness. Negative for dizziness and headaches. Objective:     General: alert, cooperative, no distress   Mental  status: mental status: alert, oriented to person, place, and time, normal mood, behavior, speech, dress, motor activity, and thought processes   Resp: resp: normal effort and no respiratory distress   Neuro: neuro: no gross deficits   Skin: skin: no discoloration or lesions of concern on visible areas     Due to this being a TeleHealth evaluation, many elements of the physical examination are unable to be assessed. Assessment & Plan:   1. Syncope  - DDx: vasovagal, seizure, arrhythmia  - REFERRAL TO NEUROLOGY  - advised patient to discuss with cardiology during his appointment today    2. Type 2 diabetes mellitus with diabetic neuropathy, without long-term current use of insulin (HCC)  - fairly controlled    3. Essential hypertension  - controlled  - continue coreg, amlodipine and Lasix     4. Cardiomyopathy, unspecified type (HCC)  - EF 46-50%  - no signs of acute decompensation  - cardiology follow-up     5. Pulmonary emphysema, unspecified emphysema type ([facility]  - stable  - now on home oxygen  - pulmo follow-up     6.  Chronic midline low back pain without sciatica  - on Robaxin, gabapentin and amitriptyline  - ortho follow-up     7. Hypercholesteremia  - continue pravastatin  - LIPID PANEL; Future  - METABOLIC PANEL, COMPREHENSIVE; Future     8. Lung nodule  - smoking cessation counseling  - check chest CT in 2 months      Follow-up and Dispositions    · Return in about 2 months (around 7/28/2020) for DM, HTN, HLD. We discussed the expected course, resolution and complications of the diagnosis(es) in detail. Medication risks, benefits, costs, interactions, and alternatives were discussed as indicated. I advised him to contact the office if his condition worsens, changes or fails to improve as anticipated. He expressed understanding with the diagnosis(es) and plan. Pursuant to the emergency declaration under the [facility] waiver authority and the [employer] Act, this Virtual  Visit was conducted, with patient's consent, to reduce the patient's risk of exposure to COVID-19 and provide continuity of care for an established patient. Services were provided through a video synchronous discussion virtually to substitute for in-person clinic visit.     Love Han MD

## 2020-05-28 NOTE — PROGRESS NOTES
[facility]    Mr. Prabha Sandoval is a 64 y.o. male with a history of COPD, diabetes, hypertension, hyperlipidemia, sleep apnea and pulmonary hypertension    Patient is here today for follow-up appointment. Today he is accompanied with the wife. They are telling me that he has been having some syncopal episodes which appears to be happening once or twice a week now. This has started few months ago. There is no warning signs. Usually patient has difficulty spitting out and he has been coughing excessively and usually this happens after this coughing spell. Sometimes it happens without any spells. He was seen by PCP and has been referred to neurology team.  He does not recall having any palpitation. This is usually associated with some wide opening of the eyes and some shaking movement lasting for less than 10 seconds. Usually he regains consciousness or this episode passes away within 10-second and he is then back to normal.  He does not complain of any chest pain. He continues to have some cough and dyspnea. He is supposed to see Dr. Parker Goodness  Denies any nausea, vomiting, abdominal pain, fever, chills, sputum production. No hematuria or other bleeding complaints    Past Medical History:   Diagnosis Date    Back pain     Cardiomyopathy ([address]     LVEF 45% (2019)    Chronic obstructive pulmonary disease (HCC)     Diabetes ([facility]     Hypercholesterolemia     Hypertension     Pulmonary hypertension ([facility]     Sleep apnea     Does not use CPAP    Tobacco abuse          Past Surgical History:   Procedure Laterality Date    COLONOSCOPY N/A 1/23/2017    COLONOSCOPY performed by Misti Gr MD at [address] [place] [address] St  2003    umbilacal     HX SHOULDER ARTHROSCOPY  2004    bilateral       Current Outpatient Medications   Medication Sig    hydroCHLOROthiazide (HYDRODIURIL) 25 mg tablet Take 1 Tab by mouth daily.     fluticasone-umeclidinium-vilanterol (TRELEGY ELLIPTA) 100-62.5-25 mcg inhaler Take 1 Puff by inhalation daily.  atorvastatin (LIPITOR) 40 mg tablet Take 1 Tab by mouth daily.  cyclobenzaprine (FLEXERIL) 10 mg tablet Take  by mouth three (3) times daily as needed for Muscle Spasm(s).  amitriptyline (ELAVIL) 50 mg tablet Take 1 Tab by mouth nightly.  furosemide (LASIX) 40 mg tablet Take 1 Tab by mouth daily.  albuterol (PROVENTIL HFA, VENTOLIN HFA, PROAIR HFA) 90 mcg/actuation inhaler Take 2 Puffs by inhalation every four (4) hours as needed for Wheezing or Shortness of Breath.  cetirizine (ZYRTEC) 10 mg tablet TAKE 1 TABLET BY MOUTH ONCE DAILY    carvedilol (COREG) 6.25 mg tablet Take 1 Tab by mouth two (2) times daily (with meals).  aspirin 81 mg chewable tablet Take 1 Tab by mouth daily.  losartan (COZAAR) 50 mg tablet Take 1 Tab by mouth daily.  gabapentin (NEURONTIN) 300 mg capsule Take 1 Cap by mouth four (4) times daily. (Patient taking differently: Take 300 mg by mouth two (2) times a day.)    carboxymethylcellulose sodium (REFRESH TEARS) 0.5 % drop ophthalmic solution Administer 1 Drop to both eyes two (2) times a day.  clobetasol (TEMOVATE) 0.05 % ointment Apply  to affected area two (2) times a day.  cholecalciferol (VITAMIN D3) 1,000 unit tablet Take 1,000 Units by mouth daily.  metFORMIN (GLUCOPHAGE) 1,000 mg tablet Take 1,000 mg by mouth two (2) times daily (with meals).  glipiZIDE (GLUCOTROL) 10 mg tablet Take 10 mg by mouth three (3) times daily.  amLODIPine (NORVASC) 10 mg tablet Take 10 mg by mouth daily. No current facility-administered medications for this visit.         Allergies and Sensitivities:  Allergies   Allergen Reactions    Lisinopril Swelling       Family History:  Family History   Problem Relation Age of Onset   Blase Liming Cancer Mother     Hypertension Mother     Heart Disease Mother     Diabetes Mother     No Known Problems Father Social History:  Social History     Tobacco Use    Smoking status: Current Every Day Smoker     Packs/day: 0.50     Years: 35.00     Pack years: 17.50     Types: Cigarettes     Start date: 4/9/1986    Smokeless tobacco: Former User     Quit date: 3/16/2016    Tobacco comment: in process of quitting    Substance Use Topics    Alcohol use: Yes     Alcohol/week: 4.0 standard drinks     Types: 4 Shots of liquor per week    Drug use: No     He  reports that he has been smoking cigarettes. He started smoking about 34 years ago. He has a 17.50 pack-year smoking history. He quit smokeless tobacco use about 4 years ago. He  reports current alcohol use of about 4.0 standard drinks of alcohol per week. Review of Systems:  Cardiac symptoms as noted above in HPI. All others negative. Denies fatigue, malaise, skin rash, joint pain, blurring vision, photophobia, neck pain, hemoptysis, chronic cough, nausea, vomiting, hematuria, burning micturition, BRBPR, chronic headaches. Physical Exam:  BP Readings from Last 3 Encounters:   05/28/20 118/78   02/19/20 142/90   02/10/20 124/79         Pulse Readings from Last 3 Encounters:   05/28/20 95   02/19/20 100   02/10/20 (!) 110          Wt Readings from Last 3 Encounters:   05/28/20 208 lb 9.6 oz (94.6 kg)   02/19/20 198 lb 12.8 oz (90.2 kg)   02/10/20 207 lb (93.9 kg)       Constitutional: Oriented to person, place, and time. HENT: Head: Normocephalic and atraumatic. Neck: No JVD present. Carotid bruit is not appreciated. Cardiovascular: Regular rhythm. No murmur, gallop or rubs appreciated  Lung: Breath sounds normal. No respiratory distress. No ronchi or rales appreciated  Abdominal: No tenderness. No rebound and no guarding. Musculoskeletal: There is no lower extremity edema.  No cynosis    Review of Data  LABS:   Lab Results   Component Value Date/Time    Sodium 136 02/10/2020 10:49 AM    Potassium 4.7 02/10/2020 10:49 AM    Chloride 101 02/10/2020 10:49 AM    CO2 30 02/10/2020 10:49 AM    Glucose 125 (H) 02/10/2020 10:49 AM    BUN 16 02/10/2020 10:49 AM    Creatinine 1.18 02/10/2020 10:49 AM     Lipids Latest Ref Rng & Units 1/20/2020 1/22/2019 9/11/2018 5/8/2018 12/22/2017   Chol, Total <200 MG/ 194 194 195 187   HDL 40 - 60 MG/DL 37(L) 46 37(L) 51 58   LDL 0 - 100 MG/. 6(H) 109. 2(H) 82.8 98.4 90.6   Trig <150 MG/(H) 194(H) 371(H) 228(H) 192(H)   Chol/HDL Ratio 0 - 5.0   4.9 4.2 5.2(H) 3.8 3.2   Some recent data might be hidden     Lab Results   Component Value Date/Time    ALT (SGPT) 14 (L) 02/10/2020 10:49 AM     Lab Results   Component Value Date/Time    Hemoglobin A1c 7.4 (H) 01/20/2020 12:55 PM    Hemoglobin A1c (POC) 6.7 06/25/2019 10:24 AM       EKG    ECHO (01/20)  Left Ventricle Normal cavity size, wall thickness and diastolic function. Mild-to-moderate systolic dysfunction. The estimated ejection fraction is 40 - 45%. Visually measured ejection fraction. Global hypokinesis observed. Wall Scoring The left ventricular wall motion is globally hypokinetic. Left Atrium Normal cavity size. Left Atrium volume index is 28.44 mL/m2. Right Ventricle Normal global systolic function. Severely dilated right ventricle. Abnormal septal motion. Diastolic flattening of interventricular septum consistent with right ventricle volume overload. Pacing wire present . Right Atrium Moderately dilated right atrium. Pacemaker wire present in the right atrium. Aortic Valve Trileaflet valve structure, no stenosis and no regurgitation. Mitral Valve No stenosis. Mitral valve non-specific thickening. Trace regurgitation. Tricuspid Valve No stenosis. Dilated annulus. Moderate regurgitation. Pulmonary arterial systolic pressure is 15.5 mmHg. Moderate to severe pulmonary hypertension. Pulmonic Valve Normal valve structure and no stenosis. Trace regurgitation. STRESS TEST (01/20)  · Baseline ECG: Normal sinus rhythm.   · Gated SPECT: Left ventricular function post-stress was abnormal. Calculated ejection fraction is 38%. · Myocardial perfusion imaging defect 1: There is a defect that is small to moderate in size present in the inferior location(s) that is predominantly fixed. The possibility of artifact and infarction cannot be excluded. · There was no convincing evidence of significant reversible defect to suggest on going major ischemia. · Myocardial perfusion imaging supports an intermediate risk stress test.    CATHETERIZATION (02/20)  Left Main   The vessel is angiographically normal.   Left Anterior Descending   Fluoroscopic calcification noted. Mild 20-30% luminal irregularities. Diagonal branches without any significant obstructive disease. LAD wraps around the apex and supplied distal inferior wall   Ramus Intermedius   Smallmedium caliber vessel without any obstructive disease   Left Circumflex   The vessel is angiographically normal. OM branch: Normal   Right Coronary Artery   Proximal-mid borderline eccentric 60-70% discrete stenosis IFR of proximal-mid RCA lesion was 0.96 suggesting physiologically not significant stenosis. Medical management is recommended     Left Ventricle The left ventricular size is normal. The left ventricular systolic function is normal. LV end diastolic pressure is normal. LV EDP is: 10. The estimated EF = 40 - 45%. Aortic Valve There is no aortic valve stenosis. Wall Motion          Right Heart     Right Heart Cath Right heart cath Hemodynamics:     Right atrial: 10mmHg  Right ventricle: 17/2/8 mmHg  Pulmonary artery: 48/19/31 mmHg  Pulmonary capillary wedge pressure: mean 10 mmHG  Cardiac output: 4.95 L/min by Chey  Cardiac index: 2.41 L/min by Chey    Oxygen saturation (%):   RA:58  PA:59  FA / LV/ RADIAL:87  No evidence of intracardiac shunts by saturation measurements     IMPRESSION & PLAN:  Mr. Jose Banerjee is 64 y. o.with a history of cardiomyopathy, hypertension, hyperlipidemia, diabetes, sleep apnea, pulmonary hypertension    Cardiomyopathy:  LVEF 45-50% in March 2019  Last echo with EF 4045% in January 2020  Ischemic in nature, cardiac cath in February 2020, no significant obstructive disease. NYHA class II symptoms. Currently on Coreg and losartan. He is also on Lasix. No significant fluid overload on exam    Chest pain /dyspnea:  Chest pain has resolved. Occasional shortness of breath. Going to follow-up with Dr. Meek Lewis cath in February 2020. Unremarkable except borderline RCA lesion which was physiologically not significant by IFR  Continue risk factor modification    Syncope: This appears to be situational, posttussive syncope after excessive coughing spells. I will reach out to PCP regarding treatment of dry cough. He also is supposed to see Dr. Alena Harry next week. He tells me that he had event monitor placed somewhere in February or March. I will try to obtain that record from different healthcare system. I do not see any records of that in our system. If I cannot find it, I will order repeat event monitor. He has stopped driving. He was advised not to drive anymore and he understands that  He is not orthostatic in the clinic today. I will reduced and stop hydrochlorothiazide    Diabetes:  Goal hemoglobin A1c less than 7.5 is recommended from cardiovascular standpoint. Defer to PCP    Hyperlipidemia:  Last . Goal LDL 70 discussed with patient. Currently on atorvastatin 40. If this is not at goal in next 6-month, would recommend increasing dose    Hypertension:  Continue current medication    COPD/pulmonary hypertension:  Defer to Dr. Alena Harry    Tobacco abuse:  Still smoke 8/10 cig a day. He is working towards quit smoking    This plan was discussed with patient who is in agreement. Thank you for allowing me to participate in patient care. Please feel free to call me if you have any question or concern. Antoine Meyer MD  Please note:  This document has been produced using voice recognition software. Unrecognized errors in transcription may be present.

## 2020-06-05 ENCOUNTER — HOSPITAL ENCOUNTER (OUTPATIENT)
Dept: CT IMAGING | Age: 60
Discharge: HOME | End: 2020-06-05
Attending: INTERNAL MEDICINE
Payer: MEDICARE

## 2020-06-05 DIAGNOSIS — R55 SYNCOPE, UNSPECIFIED SYNCOPE TYPE: ICD-10-CM

## 2020-06-05 PROCEDURE — 70450 CT HEAD/BRAIN W/O DYE: CPT

## 2020-06-24 ENCOUNTER — VIRTUAL VISIT (OUTPATIENT)
Dept: NEUROLOGY | Age: 60
End: 2020-06-24

## 2020-06-24 DIAGNOSIS — R55 SYNCOPE, UNSPECIFIED SYNCOPE TYPE: Primary | ICD-10-CM

## 2020-06-24 DIAGNOSIS — R41.9 ALTERATION OF AWARENESS: ICD-10-CM

## 2020-06-24 NOTE — PROGRESS NOTES
Ronny Cuevas is a 64 y.o. male who was seen by synchronous (real-time) audio-video technology on 6/24/2020. Consent: Ronny Cuevas, who was seen by synchronous (real-time) audio-video technology, and/or his healthcare decision maker, is aware that this patient-initiated, Telehealth encounter on 6/24/2020 is a billable service, with coverage as determined by his insurance carrier. He is aware that he may receive a bill and has provided verbal consent to proceed: Yes. Assessment & Plan:   Diagnoses and all orders for this visit:    1. Syncope, unspecified syncope type  -     EEG AWAKE AND ASLEEP; Future    2. Alteration of awareness  -     EEG AWAKE AND ASLEEP; Future    A 64years old male patient with medical history mentioned below question of episodes of passing out. Episodes usually follow an intense cough. Also has passed out during pulmonary function testing. He checks before the passing out spell. No marked postictal confusion. Denied feeling lightheaded. No chest pain or palpitation before the passing of felt. No incontinence or tongue bite. Episode lasts for about 5 seconds. Most likely has vasovagal syncope [posttussive]. Will get EEG to rule out the possibility of seizure risk. Advised him to follow-up closely with his primary care provider and pulmonary medicine for control of his cough/COPD. We will see him as needed in 3 months time. Subjective:   Ronny Cuevas is a 64 y.o. male who was seen for No chief complaint on file. A 64years old male patient with medical history of COPD, diabetes, hypertension, hyperlipidemia, sleep apnea and pulmonary hypertension here for evaluation of multiple passing out spells of about 1 year duration. Frequency is variable. He claims that his passing out spells are mostly after intense cough.   His wife mentions that after repeated coughing, he starts to shake while awake, then he passes out for a few seconds [about 5 seconds]. At that time he is unresponsive. Does not have any postictal confusion. No tongue bite or foaming from his nose. No incontinence to bowel or bladder. He had one episode of fall but no head trauma. No postictal weakness of his extremities. Last episode was yesterday. Patient denied feeling dizzy/lightheaded or spinning sensation before the passing out spell. Denied any chest pain or palpitation. No sweating. Currently claims that his coughing is better. He has a history of COPD and obstructive sleep apnea. At one point, during the pulmonary function test, he has passed out. Following with pulmonary. Had a CT scan of the head from May 2020 which was unremarkable. Prior to Admission medications    Medication Sig Start Date End Date Taking? Authorizing Provider   fluticasone-umeclidinium-vilanterol (TRELEGY ELLIPTA) 100-62.5-25 mcg inhaler Take 1 Puff by inhalation daily. 2/6/20   Blaise Ayala MD   atorvastatin (LIPITOR) 40 mg tablet Take 1 Tab by mouth daily. 1/21/20   Blaise Ayala MD   cyclobenzaprine (FLEXERIL) 10 mg tablet Take  by mouth three (3) times daily as needed for Muscle Spasm(s). Provider, Historical   amitriptyline (ELAVIL) 50 mg tablet Take 1 Tab by mouth nightly. 1/20/20   Blaise Ayala MD   furosemide (LASIX) 40 mg tablet Take 1 Tab by mouth daily. 11/21/19   Blaise Ayala MD   albuterol (PROVENTIL HFA, VENTOLIN HFA, PROAIR HFA) 90 mcg/actuation inhaler Take 2 Puffs by inhalation every four (4) hours as needed for Wheezing or Shortness of Breath. 11/15/19   Blaise Ayala MD   cetirizine (ZYRTEC) 10 mg tablet TAKE 1 TABLET BY MOUTH ONCE DAILY 10/22/19   Blaise Ayala MD   carvedilol (COREG) 6.25 mg tablet Take 1 Tab by mouth two (2) times daily (with meals). 10/22/19   Doris Ching MD   aspirin 81 mg chewable tablet Take 1 Tab by mouth daily.  7/12/18   Saúl Davis MD   losartan (COZAAR) 50 mg tablet Take 1 Tab by mouth daily. 6/8/18   Gray Bell MD   gabapentin (NEURONTIN) 300 mg capsule Take 1 Cap by mouth four (4) times daily. Patient taking differently: Take 300 mg by mouth two (2) times a day. 8/28/17   Gray Bell MD   carboxymethylcellulose sodium (REFRESH TEARS) 0.5 % drop ophthalmic solution Administer 1 Drop to both eyes two (2) times a day. 12/5/16   Gray Bell MD   clobetasol (TEMOVATE) 0.05 % ointment Apply  to affected area two (2) times a day. 7/27/16   Gray Bell MD   cholecalciferol (VITAMIN D3) 1,000 unit tablet Take 1,000 Units by mouth daily. Provider, Historical   metFORMIN (GLUCOPHAGE) 1,000 mg tablet Take 1,000 mg by mouth two (2) times daily (with meals). Provider, Historical   glipiZIDE (GLUCOTROL) 10 mg tablet Take 10 mg by mouth three (3) times daily. Provider, Historical   amLODIPine (NORVASC) 10 mg tablet Take 10 mg by mouth daily. Provider, Historical     Allergies   Allergen Reactions    Lisinopril Swelling           Review of Systems   Constitutional: Positive for malaise/fatigue. Negative for chills, fever and weight loss. HENT: Negative for hearing loss and tinnitus. Eyes: Negative for blurred vision and double vision. Respiratory: Positive for cough, sputum production and shortness of breath. Negative for hemoptysis. Cardiovascular: Positive for leg swelling. Negative for chest pain. Gastrointestinal: Positive for heartburn. Negative for nausea and vomiting. Genitourinary: Negative for dysuria, frequency and urgency. Musculoskeletal: Positive for back pain. Negative for neck pain. Skin: Negative for itching and rash. Neurological: Positive for tremors (when coughing). Negative for dizziness, tingling, focal weakness, seizures and headaches. Endo/Heme/Allergies: Does not bruise/bleed easily. Psychiatric/Behavioral: Negative for depression. The patient has insomnia (wakes up a lot).         Objective:   Vital Signs: (As obtained by patient/caregiver at home)  There were no vitals taken for this visit. [INSTRUCTIONS:  \"[x]\" Indicates a positive item  \"[]\" Indicates a negative item  -- DELETE ALL ITEMS NOT EXAMINED]    Constitutional: [] Appears well-developed and well-nourished [x] No apparent distress      [] Abnormal -     Mental status: [x] Alert and awake  [x] Oriented to person/place/time [x] Able to follow commands    [] Abnormal -     Eyes:   EOM    [x]  Normal    [] Abnormal -   Sclera  []  Normal    [] Abnormal -          Discharge []  None visible   [] Abnormal -     HENT: [x] Normocephalic, atraumatic  [] Abnormal -   [x] Mouth/Throat: Mucous membranes are moist    External Ears [] Normal  [] Abnormal -    Neck: [x] No visualized mass [] Abnormal -     Pulmonary/Chest: [x] Respiratory effort normal   [x] No visualized signs of difficulty breathing or respiratory distress        [] Abnormal -      Musculoskeletal:   [x] Normal gait with no signs of ataxia         [x] Normal range of motion of neck        [] Abnormal -     Neurological:        [x] No Facial Asymmetry (Cranial nerve 7 motor function) (limited exam due to video visit)          [x] No gaze palsy        [] Abnormal -       Mental status: Awake, alert, oriented to the day, month, and year; follows simple and complex commands.   Speech and languge: fluent, coherent, and comprehension intact  CN: EOMI,  no facial asymmetry noted, moves head from side to side with no difficulty, intact shoulder shrug, tongue is midline.    Motor: no pronator drift, moves arms and legs symmetrically, normal gait.    Coordination: Intact rapid alternating movements, able to touch his nose with his index finger and stretch out his  arms with no difficulty; normal gait.   Gait: Normal        Skin:        [] No significant exanthematous lesions or discoloration noted on facial skin         [] Abnormal -            Psychiatric:       [x] Normal Affect [] Abnormal -        [] No Hallucinations    Other pertinent observable physical exam findings:-        We discussed the expected course, resolution and complications of the diagnosis(es) in detail. Medication risks, benefits, costs, interactions, and alternatives were discussed as indicated. I advised him to contact the office if his condition worsens, changes or fails to improve as anticipated. He expressed understanding with the diagnosis(es) and plan. Cassandra Cole is a 64 y.o. male who was evaluated by a video visit encounter for concerns as above. Patient identification was verified prior to start of the visit. A caregiver was present when appropriate. Due to this being a TeleHealth encounter (During [facility] public [facility] emergency), evaluation of the following organ systems was limited: Vitals/Constitutional/EENT/Resp/CV/GI//MS/Neuro/Skin/Heme-Lymph-Imm. Pursuant to the emergency declaration under the [facility], [facility] waiver authority and the [employer] Act, this Virtual  Visit was conducted, with patient's (and/or legal guardian's) consent, to reduce the patient's risk of exposure to COVID-19 and provide necessary medical care. Services were provided through a video synchronous discussion virtually to substitute for in-person clinic visit. Patient and provider were located at their individual homes.       Paul Hart MD

## 2020-06-24 NOTE — PROGRESS NOTES
Stanley Terry is a 64 y.o. male new patient on virtual visit today to discuss syncope; as referred by Gerri Mello MD.    Spouse present to assist with visit. Mobile number [phone] will be used for today's visit.

## 2020-07-06 ENCOUNTER — TELEPHONE (OUTPATIENT)
Dept: CARDIOLOGY CLINIC | Age: 60
End: 2020-07-06

## 2020-07-06 NOTE — TELEPHONE ENCOUNTER
Attempted to contact pt at  number, no answer. Lvm for pt to return call to office at [phone]. Will continue to try to contact pt.

## 2020-07-06 NOTE — TELEPHONE ENCOUNTER
Received incoming fax from [address] [place] [address] stating pt has serious report analysis of sinus rhythm with rub/v-tach (6 beats).

## 2020-07-08 NOTE — TELEPHONE ENCOUNTER
Contacted pt at [facility] number. Two patient Identifiers confirmed. Advised he had a mild seizure Friday at a gathering. Pt stated he did not go to the ER. Pt stated he did mail his monitor back 6 days early due thinking it was due. Pt scheduled for Thursday, July 30, 2020 04:00 PM for cardiac results review.

## 2020-07-14 ENCOUNTER — HOSPITAL ENCOUNTER (OUTPATIENT)
Dept: NEUROLOGY | Age: 60
Discharge: HOME | End: 2020-07-14
Attending: STUDENT IN AN ORGANIZED HEALTH CARE EDUCATION/TRAINING PROGRAM
Payer: MEDICARE

## 2020-07-14 DIAGNOSIS — R55 SYNCOPE, UNSPECIFIED SYNCOPE TYPE: ICD-10-CM

## 2020-07-14 DIAGNOSIS — R41.9 ALTERATION OF AWARENESS: ICD-10-CM

## 2020-07-14 PROCEDURE — 95819 EEG AWAKE AND ASLEEP: CPT

## 2020-07-16 NOTE — PROCEDURES
[address] [place] [address]   EEG    Name:  Dimas Craig  MR#:   [id number]  :  1960  ACCOUNT #:  [de-identified]  DATE OF SERVICE:  2020    OUTPATIENT RECORDING    REFERRING PROVIDER:  EEG was ordered by Ana Renteria MD    REASON FOR EEG:  Evaluation of passing out spells. EEG TECHNIQUE USED:  Include a standard 10-20 system with 21-channel recording and an EKG. The patient is asleep during recording. Activation procedures used include photic stimulation. EEG REPORT:  The background consists of posteriorly dominant alpha rhythm at the frequency of 8-9 Hz. No marked asymmetry between the right and left sides. No obvious spikes or sharp wave discharges. No marked focal slowing. Photic stimulation did not induce any abnormal discharges. No abnormal discharges during sleep. IMPRESSION:  This is a normal EEG. CLINICAL CORRELATION:  A normal EEG does not rule out the possibility of seizures or epilepsy.       MD JOSE [email or web address]  D:  07/15/2020 20:09  T:  2020 3:28  JOB #:  [id number]

## 2020-07-20 ENCOUNTER — TELEPHONE (OUTPATIENT)
Dept: NEUROLOGY | Age: 60
End: 2020-07-20

## 2020-07-29 DIAGNOSIS — E78.00 HYPERCHOLESTEREMIA: ICD-10-CM

## 2020-07-29 RX ORDER — ATORVASTATIN CALCIUM 40 MG/1
TABLET, FILM COATED ORAL
Qty: 90 TAB | Refills: 2 | Status: SHIPPED | OUTPATIENT
Start: 2020-07-29

## 2020-07-30 ENCOUNTER — OFFICE VISIT (OUTPATIENT)
Dept: CARDIOLOGY CLINIC | Age: 60
End: 2020-07-30

## 2020-07-30 DIAGNOSIS — Z01.812 PRE-PROCEDURE LAB EXAM: Primary | ICD-10-CM

## 2020-07-30 DIAGNOSIS — R55 SYNCOPE AND COLLAPSE: ICD-10-CM

## 2020-07-30 NOTE — PROGRESS NOTES
[facility]    Mr. Corwin Diamond is a 64 y.o. male with a history of COPD, diabetes, hypertension, hyperlipidemia, sleep apnea and pulmonary hypertension    Patient is here today for follow-up appointment. He is accompanied with wife. He says he probably passed out for 5 times since last visit. He had a event monitor placed. His overall coughing spell has improved. He has not been driving. He said he was seen by neurology team and he had a CAT scan which was unremarkable. His EEG was also normal.  He cannot correlate any symptoms related to syncope. He feels like he still has the cough however the overall coughing is better  Denies any nausea, vomiting, abdominal pain, fever, chills, sputum production. No hematuria or other bleeding complaints    Past Medical History:   Diagnosis Date    Back pain     Cardiomyopathy ([facility]     LVEF 45% (2019)    Chronic obstructive pulmonary disease (HCC)     Diabetes ([facility]     Hypercholesterolemia     Hypertension     Pulmonary hypertension ([facility]     Sleep apnea     Does not use CPAP    Tobacco abuse          Past Surgical History:   Procedure Laterality Date    COLONOSCOPY N/A 1/23/2017    COLONOSCOPY performed by Armando Aragon MD at [address] [place] [address]  2003    umbilacal     HX SHOULDER ARTHROSCOPY  2004    bilateral       Current Outpatient Medications   Medication Sig    atorvastatin (LIPITOR) 40 mg tablet Take 1 tablet by mouth once daily    fluticasone-umeclidinium-vilanterol (TRELEGY ELLIPTA) 100-62.5-25 mcg inhaler Take 1 Puff by inhalation daily.  cyclobenzaprine (FLEXERIL) 10 mg tablet Take  by mouth three (3) times daily as needed for Muscle Spasm(s).  amitriptyline (ELAVIL) 50 mg tablet Take 1 Tab by mouth nightly.  furosemide (LASIX) 40 mg tablet Take 1 Tab by mouth daily.     albuterol (PROVENTIL HFA, VENTOLIN HFA, PROAIR HFA) 90 mcg/actuation inhaler Take 2 Puffs by inhalation every four (4) hours as needed for Wheezing or Shortness of Breath.  cetirizine (ZYRTEC) 10 mg tablet TAKE 1 TABLET BY MOUTH ONCE DAILY    carvedilol (COREG) 6.25 mg tablet Take 1 Tab by mouth two (2) times daily (with meals).  aspirin 81 mg chewable tablet Take 1 Tab by mouth daily.  losartan (COZAAR) 50 mg tablet Take 1 Tab by mouth daily.  gabapentin (NEURONTIN) 300 mg capsule Take 1 Cap by mouth four (4) times daily. (Patient taking differently: Take 300 mg by mouth two (2) times a day.)    carboxymethylcellulose sodium (REFRESH TEARS) 0.5 % drop ophthalmic solution Administer 1 Drop to both eyes two (2) times a day.  cholecalciferol (VITAMIN D3) 1,000 unit tablet Take 1,000 Units by mouth daily.  glipiZIDE (GLUCOTROL) 10 mg tablet Take 10 mg by mouth three (3) times daily.  amLODIPine (NORVASC) 10 mg tablet Take 10 mg by mouth daily. No current facility-administered medications for this visit. Allergies and Sensitivities:  Allergies   Allergen Reactions    Lisinopril Swelling       Family History:  Family History   Problem Relation Age of Onset   [facility] Cancer Mother     Hypertension Mother     Heart Disease Mother     Diabetes Mother     No Known Problems Father        Social History:  Social History     Tobacco Use    Smoking status: Current Every Day Smoker     Packs/day: 0.50     Years: 35.00     Pack years: 17.50     Types: Cigarettes     Start date: 4/9/1986    Smokeless tobacco: Former User     Quit date: 3/16/2016    Tobacco comment: in process of quitting    Substance Use Topics    Alcohol use: Yes     Alcohol/week: 4.0 standard drinks     Types: 4 Shots of liquor per week    Drug use: No     He  reports that he has been smoking cigarettes. He started smoking about 34 years ago. He has a 17.50 pack-year smoking history. He quit smokeless tobacco use about 4 years ago.   He  reports current alcohol use of about 4.0 standard drinks of alcohol per week. Review of Systems:  Cardiac symptoms as noted above in HPI. All others negative. Denies fatigue, malaise, skin rash, joint pain, blurring vision, photophobia, neck pain, hemoptysis, chronic cough, nausea, vomiting, hematuria, burning micturition, BRBPR, chronic headaches. Physical Exam:  BP Readings from Last 3 Encounters:   05/28/20 118/78   02/19/20 142/90   02/10/20 124/79         Pulse Readings from Last 3 Encounters:   05/28/20 95   02/19/20 100   02/10/20 (!) 110          Wt Readings from Last 3 Encounters:   05/28/20 208 lb 9.6 oz (94.6 kg)   02/19/20 198 lb 12.8 oz (90.2 kg)   02/10/20 207 lb (93.9 kg)       Constitutional: Oriented to person, place, and time. HENT: Head: Normocephalic and atraumatic. Neck: No JVD present. Carotid bruit is not appreciated. Cardiovascular: Regular rhythm. No murmur, gallop or rubs appreciated  Lung: Breath sounds normal. No respiratory distress. No ronchi or rales appreciated  Abdominal: No tenderness. No rebound and no guarding. Musculoskeletal: There is no lower extremity edema. No cynosis    Review of Data  LABS:   Lab Results   Component Value Date/Time    Sodium 136 02/10/2020 10:49 AM    Potassium 4.7 02/10/2020 10:49 AM    Chloride 101 02/10/2020 10:49 AM    CO2 30 02/10/2020 10:49 AM    Glucose 125 (H) 02/10/2020 10:49 AM    BUN 16 02/10/2020 10:49 AM    Creatinine 1.18 02/10/2020 10:49 AM     Lipids Latest Ref Rng & Units 1/20/2020 1/22/2019 9/11/2018 5/8/2018 12/22/2017   Chol, Total <200 MG/ 194 194 195 187   HDL 40 - 60 MG/DL 37(L) 46 37(L) 51 58   LDL 0 - 100 MG/. 6(H) 109. 2(H) 82.8 98.4 90.6   Trig <150 MG/(H) 194(H) 371(H) 228(H) 192(H)   Chol/HDL Ratio 0 - 5.0   4.9 4.2 5.2(H) 3.8 3.2   Some recent data might be hidden     Lab Results   Component Value Date/Time    ALT (SGPT) 14 (L) 02/10/2020 10:49 AM     Lab Results   Component Value Date/Time    Hemoglobin A1c 7.4 (H) 01/20/2020 12:55 PM    Hemoglobin A1c (POC) 6.7 06/25/2019 10:24 AM       EKG    ECHO (01/20)  Left Ventricle Normal cavity size, wall thickness and diastolic function. Mild-to-moderate systolic dysfunction. The estimated ejection fraction is 40 - 45%. Visually measured ejection fraction. Global hypokinesis observed. Wall Scoring The left ventricular wall motion is globally hypokinetic. Left Atrium Normal cavity size. Left Atrium volume index is 28.44 mL/m2. Right Ventricle Normal global systolic function. Severely dilated right ventricle. Abnormal septal motion. Diastolic flattening of interventricular septum consistent with right ventricle volume overload. Pacing wire present . Right Atrium Moderately dilated right atrium. Pacemaker wire present in the right atrium. Aortic Valve Trileaflet valve structure, no stenosis and no regurgitation. Mitral Valve No stenosis. Mitral valve non-specific thickening. Trace regurgitation. Tricuspid Valve No stenosis. Dilated annulus. Moderate regurgitation. Pulmonary arterial systolic pressure is 87.8 mmHg. Moderate to severe pulmonary hypertension. Pulmonic Valve Normal valve structure and no stenosis. Trace regurgitation. STRESS TEST (01/20)  · Baseline ECG: Normal sinus rhythm. · Gated SPECT: Left ventricular function post-stress was abnormal. Calculated ejection fraction is 38%. · Myocardial perfusion imaging defect 1: There is a defect that is small to moderate in size present in the inferior location(s) that is predominantly fixed. The possibility of artifact and infarction cannot be excluded. · There was no convincing evidence of significant reversible defect to suggest on going major ischemia. · Myocardial perfusion imaging supports an intermediate risk stress test.      Event monitor (5/20)  Sinus rhythm with average heart rate of 110 bpm with radiation. No A. fib or pauses noted. PVC burden 1%.   One episode of 5 beat asymptomatic NSVT    CATHETERIZATION (02/20)  Left Main The vessel is angiographically normal.   Left Anterior Descending   Fluoroscopic calcification noted. Mild 20-30% luminal irregularities. Diagonal branches without any significant obstructive disease. LAD wraps around the apex and supplied distal inferior wall   Ramus Intermedius   Smallmedium caliber vessel without any obstructive disease   Left Circumflex   The vessel is angiographically normal. OM branch: Normal   Right Coronary Artery   Proximal-mid borderline eccentric 60-70% discrete stenosis IFR of proximal-mid RCA lesion was 0.96 suggesting physiologically not significant stenosis. Medical management is recommended     Left Ventricle The left ventricular size is normal. The left ventricular systolic function is normal. LV end diastolic pressure is normal. LV EDP is: 10. The estimated EF = 40 - 45%. Aortic Valve There is no aortic valve stenosis. Wall Motion          Right Heart     Right Heart Cath Right heart cath Hemodynamics:     Right atrial: 10mmHg  Right ventricle: 17/2/8 mmHg  Pulmonary artery: 48/19/31 mmHg  Pulmonary capillary wedge pressure: mean 10 mmHG  Cardiac output: 4.95 L/min by Chey  Cardiac index: 2.41 L/min by Chey    Oxygen saturation (%):   RA:58  PA:59  FA / LV/ RADIAL:87  No evidence of intracardiac shunts by saturation measurements     IMPRESSION & PLAN:  Mr. Dot Mancilla is 64 y. o.with a history of cardiomyopathy, hypertension, hyperlipidemia, diabetes, sleep apnea, pulmonary hypertension    Cardiomyopathy:  LVEF 45-50% in March 2019  Last echo with EF 4045% in January 2020  Non-Ischemic in nature, cardiac cath in February 2020, no significant obstructive disease. NYHA class II symptoms. Currently on Coreg and losartan. He is also on Lasix. No significant fluid overload on exam  Have asked patient to take Lasix only as needed    Chest pain/dyspnea:  Chest pain has resolved. Occasional shortness of breath. Going to follow-up with Dr. Vandana Baker cath in February 2020. Unremarkable except borderline RCA lesion which was physiologically not significant by IFR  Continue risk factor modification    Syncope: This appears to be situational, posttussive syncope after excessive coughing spells. He was seen by neurology team.  His CT head and EEG has been unremarkable in 2020  Event monitor in May 2020, low risk as mentioned above  He had 4 or 5 episodes of syncope, witnessed by wife  He is not driving  Discussed with patient the procedure detail about loop recorder implantation. Risk, benefit, necessity and alternatives were discussed in detail. Complication of procedure were also discussed with patient in detail including but not limited to bleeding, infection, need for emergent surgery, heart attack, etc.... Patient is agreeable with procedure. Diabetes:  Goal hemoglobin A1c less than 7.5 is recommended from cardiovascular standpoint. Defer to PCP    Hyperlipidemia:  Last . Goal LDL 70 discussed with patient. Currently on atorvastatin 40. If this is not at goal in next 6-month, would recommend increasing dose    Hypertension:  Continue current medication    COPD/pulmonary hypertension:  Defer to Dr. Nikia Amaral    Tobacco abuse:  Still smoke 8/10 cig a day. He is working towards quit smoking    This plan was discussed with patient who is in agreement. Thank you for allowing me to participate in patient care. Please feel free to call me if you have any question or concern. Abisai Watkins MD  Please note: This document has been produced using voice recognition software. Unrecognized errors in transcription may be present.

## 2020-07-30 NOTE — PATIENT INSTRUCTIONS
*Pre -procedure LABWORK is to be done between 8/3-8/5 between 7-2pm at [place] [address]. **YOU WILL NEED SOMEONE TO DRIVE YOU HOME AFTER PROCEDURE. 1. You are scheduled to have a Loop Recorder insertion on August 10, 2020  at 10:00 am Please check in at 8:00.  
 
2. Please go to [facility] main entrance. The  will either give you directions or assist you in getting to the cath holding area. 4. You are not to eat or drink anything after midnight the night prior to procedure. You may take a sip of water to take morning medications. If you are diabetic, Do Not take your insulin/sugar pill the morning of the procedure. 5. MEDICATION INSTRUCTIONS:   Please take your morning medications with the following special instructions: 
 
[]          Please make sure to take your Blood pressure medication. []          Take your Aspirin. 6. We encourage families to wait in the waiting room on the first floor while the procedure is being done. The Doctor will come out and talk with you as soon as the procedure is over. 7. There is the possibility that you may spend the night in the hospital, depending on the results of the procedure. This will be determined after the procedure is done. If angioplasty or stent is planned, you will stay at least one day. 8. If you or your family have any questions, please call our office Monday Friday, 9:00 a. m.4:30 p.m.,  At [phone], and ask to speak to one of the nurses. 9. Be sure you have someone to drive you home, you will not be able to drive after the procedure. Patient verbalized understanding of the above instructions and was advised to call office with any further questions or concerns at [phone].

## 2020-07-30 NOTE — PROGRESS NOTES
Grace Bahena presents today for   Chief Complaint   Patient presents with   [address] preferred language for health care discussion is english/other. Is someone accompanying this pt? Yes wife    Is the patient using any DME equipment during [address]? no    Depression Screening:  3 most recent PHQ Screens 7/30/2020   Little interest or pleasure in doing things Not at all   Feeling down, depressed, irritable, or hopeless Not at all   Total Score PHQ 2 0       Learning Assessment:  Learning Assessment 7/12/2018   PRIMARY LEARNER Patient   HIGHEST LEVEL OF EDUCATION - PRIMARY LEARNER  GRADUATED HIGH SCHOOL OR GED   BARRIERS PRIMARY LEARNER NONE   CO-LEARNER CAREGIVER No   PRIMARY LANGUAGE ENGLISH   LEARNER PREFERENCE PRIMARY LISTENING     READING     DEMONSTRATION   ANSWERED BY patient   RELATIONSHIP SELF       Abuse Screening:  Abuse Screening Questionnaire 7/12/2018   Do you ever feel afraid of your partner? N   Are you in a relationship with someone who physically or mentally threatens you? N   Is it safe for you to go home? Y       Fall Risk  completed    Pt currently taking Anticoagulant therapy? no    Coordination of Care:  1. Have you been to the ER, urgent care clinic since your last visit? Hospitalized since your last visit? no    2. Have you seen or consulted any other health care providers outside of the [address] [place] [address] since your last visit? Include any pap smears or colon screening.  no

## 2020-08-03 DIAGNOSIS — R55 SYNCOPE, UNSPECIFIED SYNCOPE TYPE: ICD-10-CM

## 2020-08-05 ENCOUNTER — HOSPITAL ENCOUNTER (OUTPATIENT)
Dept: LAB | Age: 60
Discharge: HOME | End: 2020-08-05
Payer: MEDICARE

## 2020-08-05 DIAGNOSIS — I10 ESSENTIAL HYPERTENSION: Primary | ICD-10-CM

## 2020-08-05 DIAGNOSIS — I50.22 CHRONIC SYSTOLIC HEART FAILURE (HCC): ICD-10-CM

## 2020-08-05 LAB
ALBUMIN SERPL-MCNC: 2.9 G/DL (ref 3.4–5)
ALBUMIN/GLOB SERPL: 0.7 {RATIO} (ref 0.8–1.7)
ALP SERPL-CCNC: 91 U/L (ref 45–117)
ALT SERPL-CCNC: 18 U/L (ref 16–61)
ANION GAP SERPL CALC-SCNC: 5 MMOL/L (ref 3–18)
AST SERPL-CCNC: 17 U/L (ref 10–38)
BILIRUB SERPL-MCNC: 0.5 MG/DL (ref 0.2–1)
BUN SERPL-MCNC: 27 MG/DL (ref 7–18)
BUN/CREAT SERPL: 17 (ref 12–20)
CALCIUM SERPL-MCNC: 8.6 MG/DL (ref 8.5–10.1)
CHLORIDE SERPL-SCNC: 99 MMOL/L (ref 100–111)
CO2 SERPL-SCNC: 33 MMOL/L (ref 21–32)
CREAT SERPL-MCNC: 1.6 MG/DL (ref 0.6–1.3)
GLOBULIN SER CALC-MCNC: 4 G/DL (ref 2–4)
GLUCOSE SERPL-MCNC: 205 MG/DL (ref 74–99)
POTASSIUM SERPL-SCNC: 3.7 MMOL/L (ref 3.5–5.5)
PROT SERPL-MCNC: 6.9 G/DL (ref 6.4–8.2)
SODIUM SERPL-SCNC: 137 MMOL/L (ref 136–145)

## 2020-08-05 PROCEDURE — 36415 COLL VENOUS BLD VENIPUNCTURE: CPT

## 2020-08-05 PROCEDURE — 80053 COMPREHEN METABOLIC PANEL: CPT

## 2020-08-05 NOTE — TELEPHONE ENCOUNTER
Pt. Stated he usually gets medication from Dr. Julio C Moore at the [place], but he is unable to get in contact with them to get refill. Pt. Was last seen on 05/28/2020. Please advise.

## 2020-08-06 RX ORDER — AMLODIPINE BESYLATE 10 MG/1
10 TABLET ORAL DAILY
Qty: 90 TAB | Refills: 0 | Status: SHIPPED | OUTPATIENT
Start: 2020-08-06 | End: 2021-04-01 | Stop reason: DRUGHIGH

## 2020-08-10 ENCOUNTER — HOSPITAL ENCOUNTER (OUTPATIENT)
Age: 60
Setting detail: OUTPATIENT SURGERY
Discharge: HOME | End: 2020-08-10
Attending: INTERNAL MEDICINE | Admitting: INTERNAL MEDICINE
Payer: MEDICARE

## 2020-08-10 VITALS
WEIGHT: 205.2 LBS | SYSTOLIC BLOOD PRESSURE: 119 MMHG | DIASTOLIC BLOOD PRESSURE: 77 MMHG | RESPIRATION RATE: 18 BRPM | BODY MASS INDEX: 30.39 KG/M2 | HEIGHT: 69 IN | HEART RATE: 86 BPM | TEMPERATURE: 98.9 F | OXYGEN SATURATION: 97 %

## 2020-08-10 DIAGNOSIS — R55 SYNCOPE: ICD-10-CM

## 2020-08-10 LAB — GLUCOSE BLD STRIP.AUTO-MCNC: 127 MG/DL (ref 70–110)

## 2020-08-10 PROCEDURE — 77030002933 HC SUT MCRYL J&J -A: Performed by: INTERNAL MEDICINE

## 2020-08-10 PROCEDURE — 82962 GLUCOSE BLOOD TEST: CPT

## 2020-08-10 PROCEDURE — 33285 INSJ SUBQ CAR RHYTHM MNTR: CPT | Performed by: INTERNAL MEDICINE

## 2020-08-10 PROCEDURE — 74011000250 HC RX REV CODE- 250: Performed by: INTERNAL MEDICINE

## 2020-08-10 PROCEDURE — C1764 EVENT RECORDER, CARDIAC: HCPCS | Performed by: INTERNAL MEDICINE

## 2020-08-10 DEVICE — MON LNQ11 REVEAL LINQ USA FW2.0
Type: IMPLANTABLE DEVICE | Status: FUNCTIONAL
Brand: REVEAL LINQ™

## 2020-08-10 RX ORDER — SODIUM CHLORIDE 9 MG/ML
20 INJECTION, SOLUTION INTRAVENOUS CONTINUOUS
Status: DISCONTINUED | OUTPATIENT
Start: 2020-08-10 | End: 2020-08-10 | Stop reason: HOSPADM

## 2020-08-10 RX ORDER — LIDOCAINE HYDROCHLORIDE AND EPINEPHRINE 10; 10 MG/ML; UG/ML
INJECTION, SOLUTION INFILTRATION; PERINEURAL AS NEEDED
Status: DISCONTINUED | OUTPATIENT
Start: 2020-08-10 | End: 2020-08-10 | Stop reason: HOSPADM

## 2020-08-10 NOTE — Clinical Note
TRANSFER - OUT REPORT:     Verbal report given to: Mayelin Burden RN. Report consisted of patient's Situation, Background, Assessment and   Recommendations(SBAR). Opportunity for questions and clarification was provided. Patient transported with a Cardiac Cath Tech / Patient Care Tech. Patient transported to: [facility].

## 2020-08-10 NOTE — DISCHARGE INSTRUCTIONS
Patient Education        DISCHARGE SUMMARY from Nurse    PATIENT INSTRUCTIONS:    After general anesthesia or intravenous sedation, for 24 hours or while taking prescription Narcotics:  · Limit your activities  · Do not drive and operate hazardous machinery  · Do not make important personal or business decisions  · Do  not drink alcoholic beverages  · If you have not urinated within 8 hours after discharge, please contact your surgeon on call. Report the following to your surgeon:  · Excessive pain, swelling, redness or odor of or around the surgical area  · Temperature over 100.5  · Nausea and vomiting lasting longer than 4 hours or if unable to take medications  · Any signs of decreased circulation or nerve impairment to extremity: change in color, persistent  numbness, tingling, coldness or increase pain  · Any questions      These are general instructions for a healthy lifestyle:    No smoking/ No tobacco products/ Avoid exposure to second hand smoke  Surgeon General's Warning:  Quitting smoking now greatly reduces serious risk to your health. Obesity, smoking, and sedentary lifestyle greatly increases your risk for illness    A healthy diet, regular physical exercise & weight monitoring are important for maintaining a healthy lifestyle    You may be retaining fluid if you have a history of heart failure or if you experience any of the following symptoms:  Weight gain of 3 pounds or more overnight or 5 pounds in a week, increased swelling in our hands or feet or shortness of breath while lying flat in bed. Please call your doctor as soon as you notice any of these symptoms; do not wait until your next office visit. The discharge information has been reviewed with the patient. The patient verbalized understanding.   Discharge medications reviewed with the patient and appropriate educational materials and side effects teaching were provided. ___________________________________________________________________________________________________________________________________    Patient armband removed and shredded      Learning About Implantable Heart Monitors  What is an implantable heart monitor? An implantable heart monitor is a small device placed under the skin of your chest. It records the electrical signals from your heart. A monitor is used to look for irregular heartbeats. It can help your doctor find out what is causing your fainting, lightheadedness, or other symptoms. It also can help your doctor check to see if treatment for an irregular heartbeat is working. The monitor may be placed near your collarbone or near the middle of your chest. Where it's placed depends on the size and type of device. The monitor may be about the size of a small pack of chewing gum or a paper clip. These monitors are used if an irregular heart rhythm or your symptoms don't happen very often. They also help your doctor monitor your heart for a long time. How is the monitor put in place? The monitor is put in during a short surgery. You will get medicine to numb the area of your chest where the monitor will be put in. You will be awake during the surgery, but you shouldn't feel any pain. Your doctor will make a small cut and place the monitor under your skin. Then he or she will close the cut with special tape or glue or with stitches that will dissolve. Then the doctor will place a bandage over the cut. The procedure will take about half an hour. You probably will be able to go home soon after it's done. You may have some minor pain where the cut was made. You will get instructions from your doctor on how to care for it at home. When your doctor says it's safe, you should be able to get back to your normal activities. How is the monitor used? Your monitor may start recording on its own when it detects an abnormal heartbeat.  Or you might use a handheld device to start the monitor when you have symptoms. Your doctor will explain which type of monitor you have and what you need to do. Your doctor will tell you how often he or she will need to check your monitor. The information from your monitor may be sent to your doctor automatically. Or you may have to do something to send it. It may be sent over a phone line or online. You will get instructions from your doctor. Your information will stay private and secure. You will also have checkups in person. You may need to keep a symptom diary while you have the monitor. This means that you will write down the times you have symptoms and what you were doing when they started. Your doctor will let you know how to do this. He or she can then look at your heart monitor records to see if your heart rhythms changed when you had symptoms. After your doctor gets the information he or she needs, the monitor will be removed from your chest.  What else should you know about living with a heart monitor? You will get a medical ID card with information about your heart monitor. Keep it with you at all times. You can safely use most household and office electronics such as kitchen appliances, electric power tools, and computers. You will need to stay away from things with strong magnetic and electrical fields. These include MRI machines (unless your heart monitor is safe for an MRI), welding equipment, and power generators. You can use a cell phone, but keep it at least 6 inches away from your heart monitor. Your doctor or the maker of your heart monitor can give you a full list of things to avoid. When should you call for help? Call your doctor now or seek immediate medical care if:  · You have symptoms of infection, such as:  ? Increased pain, swelling, warmth, or redness around the cut.  ? Red streaks leading from the cut.  ? Pus draining from the cut.  ? A fever.   Watch closely for changes in your health, and be sure to contact your doctor if:  · You have any problems with your heart monitor. Follow-up care is a key part of your treatment and safety. Be sure to make and go to all appointments, and call your doctor if you are having problems. It's also a good idea to know your test results and keep a list of the medicines you take. Where can you learn more? Go to http://laurita-venu.info/  Enter X991 in the search box to learn more about \"Learning About Implantable Heart Monitors. \"  Current as of: December 16, 2019               Content Version: 12.5  © 7645-4062 [employer]. Care instructions adapted under license by [employer] (which disclaims liability or warranty for this information). If you have questions about a medical condition or this instruction, always ask your healthcare professional. [address] any warranty or liability for your use of this information. Patient Education        Learning About Coronavirus [phone])  Coronavirus [phone]): Overview  What is coronavirus (FRULO-92)? The coronavirus disease (COVID-19) is caused by a virus. It is an illness that was first found in [place], [place], in December 2019. It has since spread worldwide. The virus can cause fever, cough, and trouble breathing. In severe cases, it can cause pneumonia and make it hard to breathe without help. It can cause death. Coronaviruses are a large group of viruses. They cause the common cold. They also cause more serious illnesses like Middle East respiratory syndrome (MERS) and severe acute respiratory syndrome (SARS). COVID-19 is caused by a novel coronavirus. That means it's a new type that has not been seen in people before. This virus spreads person-to-person through droplets from coughing and sneezing. It can also spread when you are close to someone who is infected.  And it can spread when you touch something that has the virus on it, such as a doorknob or a tabletop. What can you do to protect yourself from coronavirus (COVID-19)? The best way to protect yourself from getting sick is to:  · Avoid areas where there is an outbreak. · Avoid contact with people who may be infected. · Wash your hands often with soap or alcohol-based hand sanitizers. · Avoid crowds and try to stay at least 6 feet away from other people. · Wash your hands often, especially after you cough or sneeze. Use soap and water, and scrub for at least 20 seconds. If soap and water aren't available, use an alcohol-based hand . · Avoid touching your mouth, nose, and eyes. What can you do to avoid spreading the virus to others? To help avoid spreading the virus to others:  · Cover your mouth with a tissue when you cough or sneeze. Then throw the tissue in the trash. · Use a disinfectant to clean things that you touch often. · Wear a cloth face cover if you have to go to public areas. · Stay home if you are sick or have been exposed to the virus. Don't go to school, work, or public areas. And don't use public transportation, ride-shares, or taxis unless you have no choice. · If you are sick:  ? Leave your home only if you need to get medical care. But call the doctor's office first so they know you're coming. And wear a face cover. ? Wear the face cover whenever you're around other people. It can help stop the spread of the virus when you cough or sneeze. ? Clean and disinfect your home every day. Use household  and disinfectant wipes or sprays. Take special care to clean things that you grab with your hands. These include doorknobs, remote controls, phones, and handles on your refrigerator and microwave. And don't forget countertops, tabletops, bathrooms, and computer keyboards. When to call for help  [id number] anytime you think you may need emergency care. For example, call if:  · You have severe trouble breathing.  (You can't talk at all.)  · You have constant chest pain or pressure. · You are severely dizzy or lightheaded. · You are confused or can't think clearly. · Your face and lips have a blue color. · You pass out (lose consciousness) or are very hard to wake up. Call your doctor now if you develop symptoms such as:  · Shortness of breath. · Fever. · Cough. If you need to get care, call ahead to the doctor's office for instructions before you go. Make sure you wear a face cover to prevent exposing other people to the virus. Where can you get the latest information? The following health organizations are tracking and studying this virus. Their websites contain the most up-to-date information. Chandler Cuevao also learn what to do if you think you may have been exposed to the virus. · U.S. Centers for Disease Control and Prevention (CDC): The CDC provides updated news about the disease and travel advice. The website also tells you how to prevent the spread of infection. www.cdc.gov  · World Health Organization [facility]): WHO offers information about the virus outbreaks. WHO also has travel advice. www.who.int  Current as of: May 8, 2020               Content Version: 12.5  © 2006-2020 [employer]wise[employer]. Care instructions adapted under license by [employer] (which disclaims liability or warranty for this information). If you have questions about a medical condition or this instruction, always ask your healthcare professional. [address] any warranty or liability for your use of this information.

## 2020-08-10 NOTE — PERIOP NOTES
.  Pre-Op Summary    Pt arrived via car with family/friend and is oriented to time, place, person and situation. Patient with steady gait with none assistive devices. Visit Vitals  BP (!) 114/93 (BP Patient Position: At rest)   Pulse 93   Temp 97 °F (36.1 °C)   Resp 18   Ht 5' 9\" (1.753 m)   Wt 93.1 kg (205 lb 3.2 oz)   BMI 30.30 kg/m²       Peripheral IV located on Left forearm. Patients belongings are located with him at the bedside. Patient's point of contact is Timi Cruz Spouse, and their contact number is: [phone] . They will be leaving and coming back. They are able to receive medication information. They will be their ride home.

## 2020-08-10 NOTE — H&P
Please see clinic note for detail. I saw and examined patient and confirmed above. No interval change. Labs reviewed. Procedure explained to patient and all risk and benefit discussed with patient. Risk, benefit, complication of loop recorder implantation disicussed with patient in detail. Proceed as planned. History and physical has been reviewed.  There have been no significant clinical changes since the completion of the originally dated History and Physical.  ------------------------------------------------------------------------------------------------------------------

## 2020-08-13 ENCOUNTER — VIRTUAL VISIT (OUTPATIENT)
Dept: FAMILY MEDICINE CLINIC | Age: 60
End: 2020-08-13

## 2020-08-13 DIAGNOSIS — G47.33 OSA (OBSTRUCTIVE SLEEP APNEA): Primary | ICD-10-CM

## 2020-08-13 DIAGNOSIS — R40.0 DAYTIME SOMNOLENCE: ICD-10-CM

## 2020-08-13 DIAGNOSIS — R55 SYNCOPE, UNSPECIFIED SYNCOPE TYPE: ICD-10-CM

## 2020-08-13 RX ORDER — FLUTICASONE PROPIONATE AND SALMETEROL 50; 250 UG/1; UG/1
POWDER RESPIRATORY (INHALATION)
COMMUNITY
Start: 2020-06-11

## 2020-08-13 RX ORDER — FUROSEMIDE 20 MG/1
TABLET ORAL
COMMUNITY
Start: 2020-08-06 | End: 2020-10-06 | Stop reason: ALTCHOICE

## 2020-08-13 RX ORDER — TIOTROPIUM BROMIDE 18 UG/1
CAPSULE ORAL; RESPIRATORY (INHALATION)
COMMUNITY
Start: 2020-06-11

## 2020-08-13 RX ORDER — HYDROCHLOROTHIAZIDE 25 MG/1
TABLET ORAL
COMMUNITY
Start: 2020-07-22 | End: 2020-11-09 | Stop reason: SDUPTHER

## 2020-08-13 RX ORDER — SPIRONOLACTONE 25 MG/1
TABLET ORAL
COMMUNITY
Start: 2020-08-06 | End: 2021-03-15

## 2020-08-13 NOTE — PROGRESS NOTES
Georgia Liz is a 64 y.o. male who was seen by synchronous (real-time) audio-video technology using doxy. me on 8/13/2020. Location of the patient: Home    Location of the provider: [facility]    Consent:  He and/or health care decision maker is aware that that he may receive a bill for this telehealth service, depending on his insurance coverage, and has provided verbal consent to proceed: Yes    Subjective:   Georgia Liz is a 64 y.o. male who presents today for management of    Chief Complaint   Patient presents with    Follow Up Chronic Condition    Fatigue     Patient reports of daytime somnolence and fatigue. He has had episodes of syncope for the past one year. The patient denies general feeling of lightheadedness, visual aura, headache, tachycardia/palpitations, nausea, excessive thirst. He was seen by neurology and EEG was normal. He currently has a loop recorder in place to evaluate for occult arrhythmia. Patient has history of JAIMEE, using nightly CPAP. He has not followed up with a sleep specialist for years.     Problem List  Patient Active Problem List    Diagnosis Date Noted    Pulmonary nodule, left 02/06/2020    Primary insomnia 01/20/2020    Degeneration of intervertebral disc of lumbar region 10/22/2019    Cigarette nicotine dependence without complication 13/28/6713    Spinal stenosis of lumbar region 06/25/2019    Cardiomyopathy ([facility] [address] 04/23/2019    Other proteinuria 03/07/2019    Type 2 diabetes mellitus with diabetic neuropathy ([address] 02/09/2018    Clubbing of nail 03/23/2016    Chronic obstructive pulmonary disease ([facility] [address] 03/23/2016    JAIMEE (obstructive sleep apnea) 09/17/2015    Midline low back pain without sciatica 09/03/2015    Essential hypertension 09/03/2015    Hypercholesteremia 09/03/2015    Hip pain 09/03/2015       Current Medications  Current Outpatient Medications   Medication Sig    Advair Diskus 250-50 mcg/dose diskus inhaler     Spiriva with HandiHaler 18 mcg inhalation capsule     furosemide (LASIX) 20 mg tablet     hydroCHLOROthiazide (HYDRODIURIL) 25 mg tablet TAKE 1 TABLET BY MOUTH ONCE DAILY    spironolactone (ALDACTONE) 25 mg tablet     amLODIPine (NORVASC) 10 mg tablet Take 1 Tab by mouth daily.  atorvastatin (LIPITOR) 40 mg tablet Take 1 tablet by mouth once daily    cyclobenzaprine (FLEXERIL) 10 mg tablet Take  by mouth three (3) times daily as needed for Muscle Spasm(s).  amitriptyline (ELAVIL) 50 mg tablet Take 1 Tab by mouth nightly.  albuterol (PROVENTIL HFA, VENTOLIN HFA, PROAIR HFA) 90 mcg/actuation inhaler Take 2 Puffs by inhalation every four (4) hours as needed for Wheezing or Shortness of Breath.  cetirizine (ZYRTEC) 10 mg tablet TAKE 1 TABLET BY MOUTH ONCE DAILY    carvedilol (COREG) 6.25 mg tablet Take 1 Tab by mouth two (2) times daily (with meals).  aspirin 81 mg chewable tablet Take 1 Tab by mouth daily.  losartan (COZAAR) 50 mg tablet Take 1 Tab by mouth daily.  gabapentin (NEURONTIN) 300 mg capsule Take 1 Cap by mouth four (4) times daily. (Patient taking differently: Take 300 mg by mouth two (2) times a day.)    carboxymethylcellulose sodium (REFRESH TEARS) 0.5 % drop ophthalmic solution Administer 1 Drop to both eyes two (2) times a day.  cholecalciferol (VITAMIN D3) 1,000 unit tablet Take 1,000 Units by mouth daily.  glipiZIDE (GLUCOTROL) 10 mg tablet Take 10 mg by mouth three (3) times daily. No current facility-administered medications for this visit.         Allergies/Drug Reactions  Allergies   Allergen Reactions    Lisinopril Swelling        Social History  Social History     Tobacco Use    Smoking status: Current Every Day Smoker     Packs/day: 0.50     Years: 35.00     Pack years: 17.50     Types: Cigarettes     Start date: 4/9/1986    Smokeless tobacco: Former User     Quit date: 3/16/2016    Tobacco comment: in process of quitting    Substance Use Topics    Alcohol use: Yes     Alcohol/week: 4.0 standard drinks     Types: 4 Shots of liquor per week    Drug use: No        Review of Systems  Review of Systems   Constitutional: Positive for malaise/fatigue. Negative for chills, fever and weight loss. Respiratory: Negative. Cardiovascular: Negative. Gastrointestinal: Negative for abdominal pain, heartburn, nausea and vomiting. Neurological: Positive for loss of consciousness. Negative for dizziness, tremors, sensory change, speech change, focal weakness, seizures, weakness and headaches. Psychiatric/Behavioral: Negative for depression. Objective:     General: alert, cooperative, no distress   Mental  status: mental status: alert, oriented to person, place, and time, normal mood, behavior, speech, dress, motor activity, and thought processes   Resp: resp: normal effort and no respiratory distress   Neuro: neuro: no gross deficits   Skin: skin: no discoloration or lesions of concern on visible areas     Due to this being a TeleHealth evaluation, many elements of the physical examination are unable to be assessed. Assessment & Plan:       ICD-10-CM ICD-9-CM    1. JAIMEE (obstructive sleep apnea)  G47.33 327.23 REFERRAL TO SLEEP STUDIES   2. Daytime somnolence  R40.0 780.54 REFERRAL TO SLEEP STUDIES   3. Syncope, unspecified syncope type  R55 780.2      Syncope work-up negative so far. Loop recorder in place. Will refer to sleep specialist for evaluation of JAIMEE and for possible narcolepsy. Follow-up and Dispositions    · Return in about 1 month (around 9/13/2020) for ROV F2F. We discussed the expected course, resolution and complications of the diagnosis(es) in detail. Medication risks, benefits, costs, interactions, and alternatives were discussed as indicated. I advised him to contact the office if his condition worsens, changes or fails to improve as anticipated. He expressed understanding with the diagnosis(es) and plan. Pursuant to the emergency declaration under the [facility] waiver authority and the [employer] and [employer] Act, this Virtual  Visit was conducted, with patient's consent, to reduce the patient's risk of exposure to COVID-19 and provide continuity of care for an established patient. Services were provided through a video synchronous discussion virtually to substitute for in-person clinic visit.     Brina Mayen MD

## 2020-08-26 DIAGNOSIS — M48.062 SPINAL STENOSIS OF LUMBAR REGION WITH NEUROGENIC CLAUDICATION: ICD-10-CM

## 2020-08-26 DIAGNOSIS — M54.50 LUMBAR SPINE PAIN: ICD-10-CM

## 2020-08-26 DIAGNOSIS — M54.16 LUMBAR RADICULOPATHY: ICD-10-CM

## 2020-08-26 DIAGNOSIS — M54.2 CERVICAL PAIN: ICD-10-CM

## 2020-08-27 RX ORDER — AMITRIPTYLINE HYDROCHLORIDE 50 MG/1
50 TABLET, FILM COATED ORAL
Qty: 90 TAB | Refills: 1 | Status: SHIPPED | OUTPATIENT
Start: 2020-08-27

## 2020-08-27 NOTE — TELEPHONE ENCOUNTER
Requested Prescriptions     Pending Prescriptions Disp Refills    amitriptyline (ELAVIL) 50 mg tablet 90 Tab 1     Sig: Take 1 Tab by mouth nightly.

## 2020-09-09 ENCOUNTER — OFFICE VISIT (OUTPATIENT)
Dept: FAMILY MEDICINE CLINIC | Age: 60
End: 2020-09-09

## 2020-09-09 ENCOUNTER — HOSPITAL ENCOUNTER (OUTPATIENT)
Dept: LAB | Age: 60
Discharge: HOME | End: 2020-09-09
Payer: MEDICARE

## 2020-09-09 VITALS
TEMPERATURE: 98.8 F | HEART RATE: 113 BPM | WEIGHT: 206 LBS | BODY MASS INDEX: 30.51 KG/M2 | SYSTOLIC BLOOD PRESSURE: 129 MMHG | RESPIRATION RATE: 18 BRPM | DIASTOLIC BLOOD PRESSURE: 84 MMHG | OXYGEN SATURATION: 90 % | HEIGHT: 69 IN

## 2020-09-09 DIAGNOSIS — I42.9 CARDIOMYOPATHY, UNSPECIFIED TYPE (HCC): ICD-10-CM

## 2020-09-09 DIAGNOSIS — R55 SYNCOPE, UNSPECIFIED SYNCOPE TYPE: ICD-10-CM

## 2020-09-09 DIAGNOSIS — G89.29 CHRONIC MIDLINE LOW BACK PAIN WITHOUT SCIATICA: ICD-10-CM

## 2020-09-09 DIAGNOSIS — E78.00 HYPERCHOLESTEREMIA: ICD-10-CM

## 2020-09-09 DIAGNOSIS — M54.50 CHRONIC MIDLINE LOW BACK PAIN WITHOUT SCIATICA: ICD-10-CM

## 2020-09-09 DIAGNOSIS — Z00.00 INITIAL MEDICARE ANNUAL WELLNESS VISIT: ICD-10-CM

## 2020-09-09 DIAGNOSIS — Z23 ENCOUNTER FOR IMMUNIZATION: ICD-10-CM

## 2020-09-09 DIAGNOSIS — R21 RASH: ICD-10-CM

## 2020-09-09 DIAGNOSIS — M51.36 DEGENERATION OF INTERVERTEBRAL DISC OF LUMBAR REGION: Primary | ICD-10-CM

## 2020-09-09 DIAGNOSIS — Z13.39 SCREENING FOR ALCOHOLISM: ICD-10-CM

## 2020-09-09 DIAGNOSIS — I10 ESSENTIAL HYPERTENSION: ICD-10-CM

## 2020-09-09 DIAGNOSIS — G89.29 ACUTE EXACERBATION OF CHRONIC LOW BACK PAIN: ICD-10-CM

## 2020-09-09 DIAGNOSIS — E11.40 TYPE 2 DIABETES MELLITUS WITH DIABETIC NEUROPATHY, WITHOUT LONG-TERM CURRENT USE OF INSULIN (HCC): ICD-10-CM

## 2020-09-09 DIAGNOSIS — M54.50 ACUTE EXACERBATION OF CHRONIC LOW BACK PAIN: ICD-10-CM

## 2020-09-09 LAB
ANION GAP SERPL CALC-SCNC: 4 MMOL/L (ref 3–18)
BUN SERPL-MCNC: 33 MG/DL (ref 7–18)
BUN/CREAT SERPL: 22 (ref 12–20)
CALCIUM SERPL-MCNC: 8.8 MG/DL (ref 8.5–10.1)
CHLORIDE SERPL-SCNC: 103 MMOL/L (ref 100–111)
CO2 SERPL-SCNC: 30 MMOL/L (ref 21–32)
CREAT SERPL-MCNC: 1.53 MG/DL (ref 0.6–1.3)
EST. AVERAGE GLUCOSE BLD GHB EST-MCNC: 220 MG/DL
GLUCOSE SERPL-MCNC: 207 MG/DL (ref 74–99)
HBA1C MFR BLD: 9.3 % (ref 4.2–5.6)
POTASSIUM SERPL-SCNC: 5.5 MMOL/L (ref 3.5–5.5)
SODIUM SERPL-SCNC: 137 MMOL/L (ref 136–145)

## 2020-09-09 PROCEDURE — 36415 COLL VENOUS BLD VENIPUNCTURE: CPT

## 2020-09-09 PROCEDURE — 80048 BASIC METABOLIC PNL TOTAL CA: CPT

## 2020-09-09 PROCEDURE — 83036 HEMOGLOBIN GLYCOSYLATED A1C: CPT

## 2020-09-09 RX ORDER — CYCLOBENZAPRINE HCL 10 MG
10 TABLET ORAL
Qty: 90 TAB | Refills: 0 | Status: SHIPPED | OUTPATIENT
Start: 2020-09-09

## 2020-09-09 RX ORDER — PREDNISONE 10 MG/1
TABLET ORAL
Qty: 21 TAB | Refills: 0 | Status: SHIPPED | OUTPATIENT
Start: 2020-09-09 | End: 2020-10-06 | Stop reason: ALTCHOICE

## 2020-09-09 RX ORDER — CLOBETASOL PROPIONATE 0.5 MG/G
OINTMENT TOPICAL 2 TIMES DAILY
Qty: 60 G | Refills: 0 | Status: SHIPPED | OUTPATIENT
Start: 2020-09-09

## 2020-09-09 NOTE — PROGRESS NOTES
Reji Nelson presents today for back pain, dm   - Is someone accompanying this pt? no  - Is the patient using any durable medical equipment during office visit? no    Coordination of Care:  1. Have you been to the ER, urgent care clinic since your last visit? Hospitalized since your last visit? no    2. Have you seen or consulted any other health care providers outside of the [address] [place] [address] since your last visit? Include any pap smears or colon screening.  Dr. Vandana Oropeza, Dr. Glenda Maldonado

## 2020-09-09 NOTE — PROGRESS NOTES
History of Present Illness  Gretel Dutta is a 64 y.o. male who presents today for management of    Chief Complaint   Patient presents with    Back Pain       Back Pain  Patient presents for presents evaluation of low back problems. Symptoms have been present for 4 days and include pain in right lower back (sharp, shooting in character; 8/10 in severity), denies weakness, denies numbness, denies paresthesias. Initial inciting event: none. Exacerbating factors identifiable by patient are standing, sitting, bending forwards. Treatments so far initiated by patient: Flexeril. Previous lower back problems: yes. Previous workup: MRI. Previous treatments: gabapentin, Flexeril, THEO. Diabetes Mellitus:  He has diabetes mellitus, and  hypertension, hyperlipidemia and cardiomyopathy. Diabetic ROS - medication compliance: compliant all of the time, diabetic diet compliance: compliant most of the time, home glucose monitoring: values are usually normal.   Lab review: orders written for new lab studies as appropriate; see orders. Cardiovascular Review:  Diet and Lifestyle: not attempting to follow a low fat, low cholesterol diet, sedentary  Home BP Monitorin/80s  Pertinent ROS: taking medications as instructed, no medication side effects noted, no TIA's, no chest pain on exertion, no dyspnea on exertion, no swelling of ankles. Patient reports of one episode of syncope this morning. He coughed prior to fainting.     Problem List  Patient Active Problem List    Diagnosis Date Noted    Pulmonary nodule, left 2020    Primary insomnia 2020    Degeneration of intervertebral disc of lumbar region 10/22/2019    Cigarette nicotine dependence without complication     Spinal stenosis of lumbar region 2019    Cardiomyopathy ([facility] 2019    Other proteinuria 2019    Type 2 diabetes mellitus with diabetic neuropathy ([facility] 2018    Clubbing of nail 2016    Chronic obstructive pulmonary disease ([facility] 03/23/2016    JAIMEE (obstructive sleep apnea) 09/17/2015    Midline low back pain without sciatica 09/03/2015    Essential hypertension 09/03/2015    Hypercholesteremia 09/03/2015    Hip pain 09/03/2015       Current Medications  Current Outpatient Medications   Medication Sig    cyclobenzaprine (FLEXERIL) 10 mg tablet Take 1 Tab by mouth three (3) times daily as needed for Muscle Spasm(s).  predniSONE (STERAPRED DS) 10 mg dose pack See administration instruction per 10mg dose pack    amitriptyline (ELAVIL) 50 mg tablet Take 1 Tab by mouth nightly.  Advair Diskus 250-50 mcg/dose diskus inhaler     Spiriva with HandiHaler 18 mcg inhalation capsule     furosemide (LASIX) 20 mg tablet     hydroCHLOROthiazide (HYDRODIURIL) 25 mg tablet TAKE 1 TABLET BY MOUTH ONCE DAILY    spironolactone (ALDACTONE) 25 mg tablet     amLODIPine (NORVASC) 10 mg tablet Take 1 Tab by mouth daily.  atorvastatin (LIPITOR) 40 mg tablet Take 1 tablet by mouth once daily    albuterol (PROVENTIL HFA, VENTOLIN HFA, PROAIR HFA) 90 mcg/actuation inhaler Take 2 Puffs by inhalation every four (4) hours as needed for Wheezing or Shortness of Breath.  cetirizine (ZYRTEC) 10 mg tablet TAKE 1 TABLET BY MOUTH ONCE DAILY    carvedilol (COREG) 6.25 mg tablet Take 1 Tab by mouth two (2) times daily (with meals).  aspirin 81 mg chewable tablet Take 1 Tab by mouth daily.  losartan (COZAAR) 50 mg tablet Take 1 Tab by mouth daily.  gabapentin (NEURONTIN) 300 mg capsule Take 1 Cap by mouth four (4) times daily. (Patient taking differently: Take 300 mg by mouth two (2) times a day.)    carboxymethylcellulose sodium (REFRESH TEARS) 0.5 % drop ophthalmic solution Administer 1 Drop to both eyes two (2) times a day.  cholecalciferol (VITAMIN D3) 1,000 unit tablet Take 1,000 Units by mouth daily.  glipiZIDE (GLUCOTROL) 10 mg tablet Take 10 mg by mouth three (3) times daily.      No current facility-administered medications for this visit. Allergies/Drug Reactions  Allergies   Allergen Reactions    Lisinopril Swelling        Review of Systems  Review of Systems   Constitutional: Negative. Respiratory: Negative. Cardiovascular: Negative. Gastrointestinal: Negative. Genitourinary: Negative. Musculoskeletal: Positive for back pain. Skin: Positive for rash. Neurological: Positive for loss of consciousness. Negative for dizziness, sensory change, speech change, focal weakness and headaches. Psychiatric/Behavioral: Positive for substance abuse. Negative for depression and memory loss. The patient is not nervous/anxious and does not have insomnia. Physical Exam  Vital signs:   Vitals:    09/09/20 1006   BP: 129/84   Pulse: (!) 113   Resp: 18   Temp: 98.8 °F (37.1 °C)   TempSrc: Oral   SpO2: 90%   Weight: 206 lb (93.4 kg)   Height: 5' 9\" (1.753 m)       General: alert, oriented, not in distress  Head: scalp normal, atraumatic  Eyes: pupils are equal and reactive, full and intact EOM's  Neck: supple, no JVD, no lymphadenopathy, non-palpable thyroid  Chest/Lungs: clear breath sounds, no wheezing or crackles  Heart: normal rate, regular rhythm, no murmur  Extremities: no focal deformities, no edema  Skin: hyperpigmented plaques with scaling on the knuckles and elbows    Assessment/Plan:    1. Acute exacerbation of chronic low back pain  - cyclobenzaprine (FLEXERIL) 10 mg tablet; Take 1 Tab by mouth three (3) times daily as needed for Muscle Spasm(s). Dispense: 90 Tab; Refill: 0  - predniSONE (STERAPRED DS) 10 mg dose pack; See administration instruction per 10mg dose pack  Dispense: 21 Tab; Refill: 0  - continue gabapentin  - follow-up with pain management if no improvement in 2 weeks. 2. Type 2 diabetes mellitus with diabetic neuropathy, without long-term current use of insulin ([address]  - control uncertain  - HEMOGLOBIN A1C WITH EAG;  Future  - METABOLIC PANEL, BASIC; Future    3. Syncope, unspecified syncope type  - work-up negative so far. - EEG negative  - Loop recorder in place.  - waiting to see sleep specialist    4. Cardiomyopathy, unspecified type ([facility]  - stable    5. Hypercholesteremia  - controlled    6. Essential hypertension  - controlled    7. Rash on extensor surfaces  - trial of clobetasol 0.05% ointment    Follow-up and Dispositions    · Return in about 3 months (around 12/9/2020) for ROV. I have discussed the diagnosis with the patient and the intended plan as seen in the above orders. The patient has received an after-visit summary and questions were answered concerning future plans. I have discussed medication side effects and warnings with the patient as well. I have reviewed the plan of care with the patient, accepted their input and they are in agreement with the treatment goals. Richard Webber MD  September 9, 2020    This is an Initial Medicare Annual Wellness Exam (AWV) (Performed 12 months after IPPE or effective date of Medicare Part B enrollment, Once in a lifetime)    I have reviewed the patient's medical history in detail and updated the computerized patient record.      History     Patient Active Problem List   Diagnosis Code    Midline low back pain without sciatica M54.5    Essential hypertension I10    Hypercholesteremia E78.00    Hip pain M25.559    JAIMEE (obstructive sleep apnea) G47.33    Clubbing of nail R68.3    Chronic obstructive pulmonary disease (HCC) J44.9    Type 2 diabetes mellitus with diabetic neuropathy (HCC) E11.40    Other proteinuria R80.8    Cardiomy[facility] I42.9    Spinal stenosis of lumbar region M48.061    Degeneration of intervertebral disc of lumbar region M51.36    Cigarette nicotine dependence without complication J92.119    Primary insomnia F51.01    Pulmonary nodule, left R91.1     Past Medical History:   Diagnosis Date    Back pain     Cardiomyopathy ([facility] [address]     LVEF 45% (2019)  Chronic obstructive pulmonary disease (HCC)     Diabetes ([facility]     Hypercholesterolemia     Hypertension     Pulmonary hypertension ([facility]     Sleep apnea     Does not use CPAP    Tobacco abuse       Past Surgical History:   Procedure Laterality Date    COLONOSCOPY N/A 1/23/2017    COLONOSCOPY performed by Cha Desai MD at [address] [place] [address] [address]  2003    umbilacal     HX SHOULDER ARTHROSCOPY  2004    bilateral    AK INSERTION SUBQ CARDIAC RHYTHM MONITOR W/PRGRMG N/A 8/10/2020    LOOP RECORDER INSERT performed by Leeanne Waters MD at [facility] C[facility]     Current Outpatient Medications   Medication Sig Dispense Refill    cyclobenzaprine (FLEXERIL) 10 mg tablet Take 1 Tab by mouth three (3) times daily as needed for Muscle Spasm(s). 90 Tab 0    predniSONE (STERAPRED DS) 10 mg dose pack See administration instruction per 10mg dose pack 21 Tab 0    amitriptyline (ELAVIL) 50 mg tablet Take 1 Tab by mouth nightly. 90 Tab 1    Advair Diskus 250-50 mcg/dose diskus inhaler       Spiriva with HandiHaler 18 mcg inhalation capsule       furosemide (LASIX) 20 mg tablet       hydroCHLOROthiazide (HYDRODIURIL) 25 mg tablet TAKE 1 TABLET BY MOUTH ONCE DAILY      spironolactone (ALDACTONE) 25 mg tablet       amLODIPine (NORVASC) 10 mg tablet Take 1 Tab by mouth daily. 90 Tab 0    atorvastatin (LIPITOR) 40 mg tablet Take 1 tablet by mouth once daily 90 Tab 2    albuterol (PROVENTIL HFA, VENTOLIN HFA, PROAIR HFA) 90 mcg/actuation inhaler Take 2 Puffs by inhalation every four (4) hours as needed for Wheezing or Shortness of Breath. 1 Inhaler 3    cetirizine (ZYRTEC) 10 mg tablet TAKE 1 TABLET BY MOUTH ONCE DAILY 90 Tab 3    carvedilol (COREG) 6.25 mg tablet Take 1 Tab by mouth two (2) times daily (with meals). 180 Tab 3    aspirin 81 mg chewable tablet Take 1 Tab by mouth daily. 90 Tab 1    losartan (COZAAR) 50 mg tablet Take 1 Tab by mouth daily.  30 Tab 3    gabapentin (NEURONTIN) 300 mg capsule Take 1 Cap by mouth four (4) times daily. (Patient taking differently: Take 300 mg by mouth two (2) times a day.) 120 Cap 3    carboxymethylcellulose sodium (REFRESH TEARS) 0.5 % drop ophthalmic solution Administer 1 Drop to both eyes two (2) times a day. 30 mL 3    cholecalciferol (VITAMIN D3) 1,000 unit tablet Take 1,000 Units by mouth daily.  glipiZIDE (GLUCOTROL) 10 mg tablet Take 10 mg by mouth three (3) times daily. Allergies   Allergen Reactions    Lisinopril Swelling       Family History   Problem Relation Age of Onset   Desiree Lukasz Cancer Mother     Hypertension Mother     Heart Disease Mother     Diabetes Mother     No Known Problems Father      Social History     Tobacco Use    Smoking status: Current Every Day Smoker     Packs/day: 0.50     Years: 35.00     Pack years: 17.50     Types: Cigarettes     Start date: 4/9/1986    Smokeless tobacco: Former User     Quit date: 3/16/2016    Tobacco comment: in process of quitting    Substance Use Topics    Alcohol use: Yes     Alcohol/week: 4.0 standard drinks     Types: 4 Shots of liquor per week       Depression Risk Factor Screening:     3 most recent PHQ Screens 9/9/2020   Little interest or pleasure in doing things Not at all   Feeling down, depressed, irritable, or hopeless Not at all   Total Score PHQ 2 0       Alcohol Risk Screen     Alcohol Risk Factor Screening (MALE < 65): Do you average more than 2 drinks per night or 14 drinks a week: No    On any one occasion in the past three months have you have had more than 4 drinks containing alcohol:  Yes         Functional Ability and Level of Safety:   Hearing: Hearing is good. Activities of Daily Living: The home contains: no safety equipment. Patient does total self care    Ambulation: with no difficulty      Fall Risk:  Fall Risk Assessment, last 12 mths 9/9/2020   Able to walk? Yes   Fall in past 12 months?  No     Abuse Screen:  Patient is not abused       Cognitive Screening   Has your family/caregiver stated any concerns about your memory: no     Cognitive Screening: Normal - Verbal Fluency Test    Patient Care Team   Patient Care Team:  Cassandra Brewster MD as PCP - General (Internal Medicine)  Cassandra Brewster MD as PCP - REHABILITATION [facility] Empaneled Provider  Zahraa Lan MD (Nephrology)  Demetrius Mcgregor MD (Pulmonary Disease)  Luisa Durán DO (Physical Medicine and Rehabilitation)    Assessment/Plan   Education and counseling provided:  Are appropriate based on today's review and evaluation    Diagnoses and all orders for this visit:    1. Degeneration of intervertebral disc of lumbar region    2. Chronic midline low back pain without sciatica    3. Type 2 diabetes mellitus with diabetic neuropathy, without long-term current use of insulin (HCC)  -     HEMOGLOBIN A1C WITH EAG; Future  -     METABOLIC PANEL, BASIC; Future    4. Cardiomyopathy, unspecified type ([facility]    5. Hypercholesteremia    6. Essential hypertension    7. Initial Medicare annual wellness visit    8. Screening for alcoholism  -     [place] ANNUAL ALCOHOL SCREEN 15 MIN    9. Acute exacerbation of chronic low back pain  -     cyclobenzaprine (FLEXERIL) 10 mg tablet; Take 1 Tab by mouth three (3) times daily as needed for Muscle Spasm(s). -     predniSONE (STERAPRED DS) 10 mg dose pack; See administration instruction per 10mg dose pack    10. Syncope, unspecified syncope type    11.  Encounter for immunization  -     INFLUENZA VIRUS VAC QUAD,SPLIT,PRESV FREE SYRINGE IM  -     LA IMMUNIZ ADMIN,1 SINGLE/COMB VAC/TOXOID         Health Maintenance Due   Topic Date Due    Eye Exam Retinal or Dilated  03/08/1970    Shingrix Vaccine Age 50> (2 of 2) 03/16/2020    Medicare Yearly Exam  06/25/2020    Foot Exam Q1  06/25/2020    Flu Vaccine (1) 09/01/2020

## 2020-09-10 ENCOUNTER — TELEPHONE (OUTPATIENT)
Dept: FAMILY MEDICINE CLINIC | Age: 60
End: 2020-09-10

## 2020-09-10 DIAGNOSIS — E11.40 TYPE 2 DIABETES MELLITUS WITH DIABETIC NEUROPATHY, WITHOUT LONG-TERM CURRENT USE OF INSULIN (HCC): Primary | ICD-10-CM

## 2020-09-10 PROBLEM — N18.30 STAGE 3 CHRONIC KIDNEY DISEASE (HCC): Status: ACTIVE | Noted: 2020-09-10

## 2020-09-10 RX ORDER — METFORMIN HYDROCHLORIDE 500 MG/1
500 TABLET ORAL 2 TIMES DAILY WITH MEALS
Qty: 180 TAB | Refills: 1 | Status: SHIPPED | OUTPATIENT
Start: 2020-09-10 | End: 2021-02-22

## 2020-09-10 NOTE — TELEPHONE ENCOUNTER
Informed patient of HbA1c of 9.3%. Lifestyle modification. Restart metformin. Continue glipizide. Also informed patient of decreased GFR. Repeat HbA1c and RFP in 3 months.

## 2020-10-05 ENCOUNTER — OFFICE VISIT (OUTPATIENT)
Dept: CARDIOLOGY CLINIC | Age: 60
End: 2020-10-05
Payer: MEDICARE

## 2020-10-05 ENCOUNTER — CLINICAL SUPPORT (OUTPATIENT)
Dept: CARDIOLOGY CLINIC | Age: 60
End: 2020-10-05

## 2020-10-05 DIAGNOSIS — R55 SYNCOPE AND COLLAPSE: Primary | ICD-10-CM

## 2020-10-05 DIAGNOSIS — Z95.9 CARDIAC DEVICE IN SITU: ICD-10-CM

## 2020-10-05 DIAGNOSIS — I42.9 CARDIOMYOPATHY, UNSPECIFIED TYPE (HCC): ICD-10-CM

## 2020-10-05 DIAGNOSIS — I27.20 PULMONARY HTN (HCC): ICD-10-CM

## 2020-10-05 PROCEDURE — G8756 NO BP MEASURE DOC: HCPCS | Performed by: INTERNAL MEDICINE

## 2020-10-05 PROCEDURE — G8428 CUR MEDS NOT DOCUMENT: HCPCS | Performed by: INTERNAL MEDICINE

## 2020-10-05 PROCEDURE — G8432 DEP SCR NOT DOC, RNG: HCPCS | Performed by: INTERNAL MEDICINE

## 2020-10-05 PROCEDURE — G8417 CALC BMI ABV UP PARAM F/U: HCPCS | Performed by: INTERNAL MEDICINE

## 2020-10-05 PROCEDURE — 3017F COLORECTAL CA SCREEN DOC REV: CPT | Performed by: INTERNAL MEDICINE

## 2020-10-05 PROCEDURE — 99214 OFFICE O/P EST MOD 30 MIN: CPT | Performed by: INTERNAL MEDICINE

## 2020-10-05 NOTE — PROGRESS NOTES
[facility]    Mr. Abhijeet Wong is a 64 y.o. male with a history of COPD, diabetes, hypertension, hyperlipidemia, sleep apnea and pulmonary hypertension    Patient is here today for follow-up appointment. Patient went to emergency department last week with some episode of possible syncope. He tells me that he was sitting in the kitchen table and suddenly felt like he lost consciousness. He woke up and he was able to understand what was going on around himself however he was not able to move. He tells me that his blood pressure was low when he went to emergency department. He did not have any seizure-like activity. This was witnessed by the wife according to patient however she is not here in the clinic today. She is waiting outside the building in the car. Denies any nausea, vomiting, abdominal pain, fever, chills, sputum production. No hematuria or other bleeding complaints    Past Medical History:   Diagnosis Date    Back pain     Cardiomyopathy ([address]     LVEF 45% (2019)    Chronic obstructive pulmonary disease (HCC)     Diabetes [facility]     Hypercholesterolemia     Hypertension     Pulmonary hypertension ([facility]     Sleep apnea     Does not use CPAP    Tobacco abuse          Past Surgical History:   Procedure Laterality Date    COLONOSCOPY N/A 1/23/2017    COLONOSCOPY performed by Erasmo Ding MD at [address] [place] [address] [address]  2003    umbilacal     HX SHOULDER ARTHROSCOPY  2004    bilateral    [place] INSERTION SUBQ CARDIAC RHYTHM MONITOR W/[facility] N/A 8/10/2020    LOOP RECORDER INSERT performed by Ze Low MD at [facility] CATH LAB       Current Outpatient Medications   Medication Sig    metFORMIN (GLUCOPHAGE) 500 mg tablet Take 1 Tab by mouth two (2) times daily (with meals).  cyclobenzaprine (FLEXERIL) 10 mg tablet Take 1 Tab by mouth three (3) times daily as needed for Muscle Spasm(s).     predniSONE (STERAPRED DS) 10 mg dose pack See administration instruction per 10mg dose pack    clobetasoL (TEMOVATE) 0.05 % ointment Apply  to affected area two (2) times a day. Use a thin film.  amitriptyline (ELAVIL) 50 mg tablet Take 1 Tab by mouth nightly.  Advair Diskus 250-50 mcg/dose diskus inhaler     Spiriva with HandiHaler 18 mcg inhalation capsule     furosemide (LASIX) 20 mg tablet     hydroCHLOROthiazide (HYDRODIURIL) 25 mg tablet TAKE 1 TABLET BY MOUTH ONCE DAILY    spironolactone (ALDACTONE) 25 mg tablet     amLODIPine (NORVASC) 10 mg tablet Take 1 Tab by mouth daily.  atorvastatin (LIPITOR) 40 mg tablet Take 1 tablet by mouth once daily    albuterol (PROVENTIL HFA, VENTOLIN HFA, PROAIR HFA) 90 mcg/actuation inhaler Take 2 Puffs by inhalation every four (4) hours as needed for Wheezing or Shortness of Breath.  cetirizine (ZYRTEC) 10 mg tablet TAKE 1 TABLET BY MOUTH ONCE DAILY    carvedilol (COREG) 6.25 mg tablet Take 1 Tab by mouth two (2) times daily (with meals).  aspirin 81 mg chewable tablet Take 1 Tab by mouth daily.  losartan (COZAAR) 50 mg tablet Take 1 Tab by mouth daily.  gabapentin (NEURONTIN) 300 mg capsule Take 1 Cap by mouth four (4) times daily. (Patient taking differently: Take 300 mg by mouth two (2) times a day.)    carboxymethylcellulose sodium (REFRESH TEARS) 0.5 % drop ophthalmic solution Administer 1 Drop to both eyes two (2) times a day.  cholecalciferol (VITAMIN D3) 1,000 unit tablet Take 1,000 Units by mouth daily.  glipiZIDE (GLUCOTROL) 10 mg tablet Take 10 mg by mouth three (3) times daily. No current facility-administered medications for this visit.         Allergies and Sensitivities:  Allergies   Allergen Reactions    Lisinopril Swelling       Family History:  Family History   Problem Relation Age of Onset   Marea.Ales Cancer Mother     Hypertension Mother     Heart Disease Mother     Diabetes Mother     No Known Problems Father        Social History:  Social History     Tobacco Use    Smoking status: Current Every Day Smoker     Packs/day: 0.50     Years: 35.00     Pack years: 17.50     Types: Cigarettes     Start date: 4/9/1986    Smokeless tobacco: Former User     Quit date: 3/16/2016    Tobacco comment: in process of quitting    Substance Use Topics    Alcohol use: Yes     Alcohol/week: 4.0 standard drinks     Types: 4 Shots of liquor per week    Drug use: No     He  reports that he has been smoking cigarettes. He started smoking about 34 years ago. He has a 17.50 pack-year smoking history. He quit smokeless tobacco use about 4 years ago. He  reports current alcohol use of about 4.0 standard drinks of alcohol per week. Review of Systems:  Cardiac symptoms as noted above in HPI. All others negative. Denies fatigue, malaise, skin rash, joint pain, blurring vision, photophobia, neck pain, hemoptysis, chronic cough, nausea, vomiting, hematuria, burning micturition, BRBPR, chronic headaches. Physical Exam:  BP Readings from Last 3 Encounters:   09/09/20 129/84   08/10/20 119/77   05/28/20 118/78         Pulse Readings from Last 3 Encounters:   09/09/20 (!) 113   08/10/20 86   05/28/20 95          Wt Readings from Last 3 Encounters:   09/09/20 206 lb (93.4 kg)   08/10/20 205 lb 3.2 oz (93.1 kg)   05/28/20 208 lb 9.6 oz (94.6 kg)       Constitutional: Oriented to person, place, and time. HENT: Head: Normocephalic and atraumatic. Neck: No JVD present. Carotid bruit is not appreciated. Cardiovascular: Regular rhythm. No murmur, gallop or rubs appreciated  Lung: Breath sounds normal. No respiratory distress. No ronchi or rales appreciated  Abdominal: No tenderness. No rebound and no guarding. Musculoskeletal: There is no lower extremity edema.  No cynosis    Review of Data  LABS:   Lab Results   Component Value Date/Time    Sodium 137 09/09/2020 10:47 AM    Potassium 5.5 09/09/2020 10:47 AM    Chloride 103 09/09/2020 10:47 AM    CO2 30 09/09/2020 10:47 AM    Glucose 207 (H) 09/09/2020 10:47 AM    BUN 33 (H) 09/09/2020 10:47 AM    Creatinine 1.53 (H) 09/09/2020 10:47 AM     Lipids Latest Ref Rng & Units 1/20/2020 1/22/2019 9/11/2018 5/8/2018 12/22/2017   Chol, Total <200 MG/ 194 194 195 187   HDL 40 - 60 MG/DL 37(L) 46 37(L) 51 58   LDL 0 - 100 MG/. 6(H) 109. 2(H) 82.8 98.4 90.6   Trig <150 MG/(H) 194(H) 371(H) 228(H) 192(H)   Chol/HDL Ratio 0 - 5.0   4.9 4.2 5.2(H) 3.8 3.2   Some recent data might be hidden     Lab Results   Component Value Date/Time    ALT (SGPT) 18 08/05/2020 08:24 AM     Lab Results   Component Value Date/Time    Hemoglobin A1c 9.3 (H) 09/09/2020 10:47 AM    Hemoglobin A1c (POC) 6.7 06/25/2019 10:24 AM       EKG    ECHO (01/20)  Left Ventricle Normal cavity size, wall thickness and diastolic function. Mild-to-moderate systolic dysfunction. The estimated ejection fraction is 40 - 45%. Visually measured ejection fraction. Global hypokinesis observed. Wall Scoring The left ventricular wall motion is globally hypokinetic. Left Atrium Normal cavity size. Left Atrium volume index is 28.44 mL/m2. Right Ventricle Normal global systolic function. Severely dilated right ventricle. Abnormal septal motion. Diastolic flattening of interventricular septum consistent with right ventricle volume overload. Pacing wire present . Right Atrium Moderately dilated right atrium. Pacemaker wire present in the right atrium. Aortic Valve Trileaflet valve structure, no stenosis and no regurgitation. Mitral Valve No stenosis. Mitral valve non-specific thickening. Trace regurgitation. Tricuspid Valve No stenosis. Dilated annulus. Moderate regurgitation. Pulmonary arterial systolic pressure is 99.2 mmHg. Moderate to severe pulmonary hypertension. Pulmonic Valve Normal valve structure and no stenosis. Trace regurgitation. STRESS TEST (01/20)  · Baseline ECG: Normal sinus rhythm.   · Gated SPECT: Left ventricular function post-stress was abnormal. Calculated ejection fraction is 38%. · Myocardial perfusion imaging defect 1: There is a defect that is small to moderate in size present in the inferior location(s) that is predominantly fixed. The possibility of artifact and infarction cannot be excluded. · There was no convincing evidence of significant reversible defect to suggest on going major ischemia. · Myocardial perfusion imaging supports an intermediate risk stress test.      Event monitor (5/20)  Sinus rhythm with average heart rate of 110 bpm with radiation. No A. fib or pauses noted. PVC burden 1%. One episode of 5 beat asymptomatic NSVT    CATHETERIZATION (02/20)  Left Main   The vessel is angiographically normal.   Left Anterior Descending   Fluoroscopic calcification noted. Mild 20-30% luminal irregularities. Diagonal branches without any significant obstructive disease. LAD wraps around the apex and supplied distal inferior wall   Ramus Intermedius   Smallmedium caliber vessel without any obstructive disease   Left Circumflex   The vessel is angiographically normal. OM branch: Normal   Right Coronary Artery   Proximal-mid borderline eccentric 60-70% discrete stenosis IFR of proximal-mid RCA lesion was 0.96 suggesting physiologically not significant stenosis. Medical management is recommended     Left Ventricle The left ventricular size is normal. The left ventricular systolic function is normal. LV end diastolic pressure is normal. LV EDP is: 10. The estimated EF = 40 - 45%. Aortic Valve There is no aortic valve stenosis.    Wall Motion          Right Heart     Right Heart Cath Right heart cath Hemodynamics:     Right atrial: 10mmHg  Right ventricle: 17/2/8 mmHg  Pulmonary artery: 48/19/31 mmHg  Pulmonary capillary wedge pressure: mean 10 mmHG  Cardiac output: 4.95 L/min by Chey  Cardiac index: 2.41 L/min by Chey    Oxygen saturation (%):   RA:58  PA:59  FA / LV/ RADIAL:87  No evidence of intracardiac shunts by saturation measurements     IMPRESSION & PLAN:  Mr. Maria Luisa Dupont is 64 y. o.with a history of cardiomyopathy, hypertension, hyperlipidemia, diabetes, sleep apnea, pulmonary hypertension    Cardiomyopathy:  LVEF 45-50% in March 2019  Last echo with EF 4045% in January 2020  Non-Ischemic in nature, cardiac cath in February 2020, no significant obstructive disease. NYHA class II symptoms. Currently on Coreg and losartan. He has no evidence of fluid overload on exam.  Have asked patient to take Lasix only as needed    Borderline CAD:  RCA lesion iFR, 0.96, physiologically not significant. No angina symptoms  Continue risk factor modification. Syncope: This was thought to be situational, posttussive syncope after excessive coughing spells. He was seen by neurology team.  His CT head and EEG has been unremarkable in 2020  Event monitor in May 2020, low risk as mentioned above  Loop recorder implanted in 08/20  On going evens. Unable to figure out yet. Does not sound true syncope. It could be because of hypotension being on multiple medications. Today we interrogated his loop recorder and there was no evidence of arrhythmia or tachycardia or bradycardia arrhythmia that correlated with his symptoms. There was sinus rhythm  According to our chart he is on multiple diuretic medication. He is not sure what he is taking. He could be on Lasix, hydrochlorothiazide and spironolactone. He is also on amlodipine, Coreg and losartan. I have asked him to go home and call us back with pill bottles. I will try to reduced medications. My plan is to discontinue all diuretics and use it only as needed. He will call us back and we will make that decision      Diabetes:  Goal hemoglobin A1c less than 7.5 is recommended from cardiovascular standpoint. Defer to PCP    Hyperlipidemia:  Last . Goal LDL 70 discussed with patient. Currently on atorvastatin 40.   If this is not at goal in next 6-month, would recommend increasing dose    Hypertension:  Continue current medication    COPD/pulmonary hypertension:  Defer to Dr. Salinas Lukasz    Tobacco abuse:  Still smoke 8/10 cig a day. He is working towards quit smoking    This plan was discussed with patient who is in agreement. Thank you for allowing me to participate in patient care. Please feel free to call me if you have any question or concern. Shawnee Roman MD  Please note: This document has been produced using voice recognition software. Unrecognized errors in transcription may be present.

## 2020-10-06 ENCOUNTER — TELEPHONE (OUTPATIENT)
Dept: CARDIOLOGY CLINIC | Age: 60
End: 2020-10-06

## 2020-10-06 RX ORDER — FUROSEMIDE 20 MG/1
TABLET ORAL DAILY
COMMUNITY
End: 2021-03-09 | Stop reason: SDUPTHER

## 2020-10-06 RX ORDER — PRAVASTATIN SODIUM 40 MG/1
40 TABLET ORAL
COMMUNITY

## 2020-10-06 RX ORDER — FUROSEMIDE 40 MG/1
TABLET ORAL DAILY
COMMUNITY
End: 2021-03-09

## 2020-10-15 ENCOUNTER — TELEPHONE (OUTPATIENT)
Dept: CARDIOLOGY CLINIC | Age: 60
End: 2020-10-15

## 2020-10-15 NOTE — TELEPHONE ENCOUNTER
Spouse called to ask if Dr. Leighann Lock would review meds and decide which medication to d/c to prevent reported hypotensive episodes. Spouse has not kept a BP log and has no idea what patient's normal BP is. Spouse advised to keep BP log with readings in the am, pm and after any \"seizure\" episodes so that Dr. Leighann Lock has enough information to make an informed adjustment. This has been fully explained to the patient's spouse, who indicates understanding.

## 2020-11-09 RX ORDER — HYDROCHLOROTHIAZIDE 25 MG/1
TABLET ORAL
Qty: 90 TAB | Refills: 0 | Status: SHIPPED | OUTPATIENT
Start: 2020-11-09 | End: 2021-01-05

## 2020-11-11 ENCOUNTER — OFFICE VISIT (OUTPATIENT)
Dept: CARDIOLOGY CLINIC | Age: 60
End: 2020-11-11
Payer: MEDICARE

## 2020-11-11 DIAGNOSIS — Z95.9 CARDIAC DEVICE IN SITU: ICD-10-CM

## 2020-11-11 DIAGNOSIS — R55 SYNCOPE AND COLLAPSE: Primary | ICD-10-CM

## 2020-11-11 DIAGNOSIS — R55 SYNCOPE, UNSPECIFIED SYNCOPE TYPE: ICD-10-CM

## 2020-11-11 PROCEDURE — 93298 REM INTERROG DEV EVAL SCRMS: CPT | Performed by: INTERNAL MEDICINE

## 2021-01-05 RX ORDER — HYDROCHLOROTHIAZIDE 25 MG/1
TABLET ORAL
Qty: 90 TAB | Refills: 1 | Status: SHIPPED | OUTPATIENT
Start: 2021-01-05 | End: 2021-04-22 | Stop reason: SDUPTHER

## 2021-03-04 ENCOUNTER — TELEPHONE (OUTPATIENT)
Dept: CARDIOLOGY CLINIC | Age: 61
End: 2021-03-04

## 2021-03-04 NOTE — TELEPHONE ENCOUNTER
Called and left a voicemail for the patient to call the office to schedule an appointment with Dr. Nelson Morataya. Also to schedule an in office device check and to reschedule his care[facility] appointment to 90 days after his in office device check.       Jolene Mclaughlin, ARACELY, CET, CPT    MA for [facility]

## 2021-03-05 NOTE — TELEPHONE ENCOUNTER
Called the patient to schedule a overdue 3 month follow up appointment with Dr. Meek Aldridge. Patient states that he would like to get the Loop Recorder taken out. So at this time I did not schedule a device check.      Appointment is scheduled for April 1, 2021 at 11:00 AM.

## 2021-03-08 ENCOUNTER — TELEPHONE (OUTPATIENT)
Dept: FAMILY MEDICINE CLINIC | Age: 61
End: 2021-03-08

## 2021-03-08 NOTE — TELEPHONE ENCOUNTER
Patient is concerned about legs and feet being swollen for too long. He says the dosage on his water pills have been doubled. His legs and feet went down a little but they are still swollen.   Please advise

## 2021-03-08 NOTE — TELEPHONE ENCOUNTER
Not much I can do for now. Advise leg elevation, limit salt, continue diuretics. Keep appointment with me next week or see another provider sooner.

## 2021-03-09 ENCOUNTER — OFFICE VISIT (OUTPATIENT)
Dept: FAMILY MEDICINE CLINIC | Age: 61
End: 2021-03-09
Payer: MEDICARE

## 2021-03-09 VITALS
HEART RATE: 95 BPM | TEMPERATURE: 94.8 F | HEIGHT: 69 IN | BODY MASS INDEX: 31.13 KG/M2 | OXYGEN SATURATION: 91 % | DIASTOLIC BLOOD PRESSURE: 68 MMHG | SYSTOLIC BLOOD PRESSURE: 110 MMHG | WEIGHT: 210.2 LBS | RESPIRATION RATE: 16 BRPM

## 2021-03-09 DIAGNOSIS — M79.89 LEG SWELLING: Primary | ICD-10-CM

## 2021-03-09 PROCEDURE — G8510 SCR DEP NEG, NO PLAN REQD: HCPCS | Performed by: STUDENT IN AN ORGANIZED HEALTH CARE EDUCATION/TRAINING PROGRAM

## 2021-03-09 PROCEDURE — G8428 CUR MEDS NOT DOCUMENT: HCPCS | Performed by: STUDENT IN AN ORGANIZED HEALTH CARE EDUCATION/TRAINING PROGRAM

## 2021-03-09 PROCEDURE — G8752 SYS BP LESS 140: HCPCS | Performed by: STUDENT IN AN ORGANIZED HEALTH CARE EDUCATION/TRAINING PROGRAM

## 2021-03-09 PROCEDURE — 99214 OFFICE O/P EST MOD 30 MIN: CPT | Performed by: STUDENT IN AN ORGANIZED HEALTH CARE EDUCATION/TRAINING PROGRAM

## 2021-03-09 PROCEDURE — 3017F COLORECTAL CA SCREEN DOC REV: CPT | Performed by: STUDENT IN AN ORGANIZED HEALTH CARE EDUCATION/TRAINING PROGRAM

## 2021-03-09 PROCEDURE — G0463 HOSPITAL OUTPT CLINIC VISIT: HCPCS | Performed by: STUDENT IN AN ORGANIZED HEALTH CARE EDUCATION/TRAINING PROGRAM

## 2021-03-09 PROCEDURE — G8417 CALC BMI ABV UP PARAM F/U: HCPCS | Performed by: STUDENT IN AN ORGANIZED HEALTH CARE EDUCATION/TRAINING PROGRAM

## 2021-03-09 PROCEDURE — G8754 DIAS BP LESS 90: HCPCS | Performed by: STUDENT IN AN ORGANIZED HEALTH CARE EDUCATION/TRAINING PROGRAM

## 2021-03-09 RX ORDER — FUROSEMIDE 20 MG/1
20 TABLET ORAL DAILY
Qty: 30 TAB | Refills: 0 | Status: SHIPPED | OUTPATIENT
Start: 2021-03-09 | End: 2021-03-15 | Stop reason: SDUPTHER

## 2021-03-09 NOTE — PROGRESS NOTES
Cassandra Cole is a 63 y.o.  male and presents with    Chief Complaint   Patient presents with    Leg Swelling     bilateral mostly left, per pt skin feels tight like it's hurting the bone            Subjective:    Pt was recently seen in the ED d/t b/l leg swelling. Worse on the L than the R. Per records of the ED note, and chart pt previous was on Lasix, at the ED he was told to take 2 fluid pills per day for 3 days. Pt has been taking 2 HCTZ pills per day. Pt states he stopped smoking about 1 month ago. Denies adding any salt to his food. Pt states that prior to presenting to the ED on 3/1/2021 he had leg swelling for about 1 week. There might had been some non-compliance of the medicine that might had exacerbated the leg swelling.     Pt brought all his medicine with him today and he has no Lasix    Patient Active Problem List   Diagnosis Code    Midline low back pain without sciatica M54.5    Essential hypertension I10    Hypercholesteremia E78.00    Hip pain M25.559    JAIMEE (obstructive sleep apnea) G47.33    Clubbing of nail R68.3    Chronic obstructive pulmonary disease (HCC) J44.9    Type 2 diabetes mellitus with diabetic neuropathy ([facility]) E11.40    Other proteinuria R80.8    Cardiomyopathy ([facility]) I42.9    Spinal stenosis of lumbar region M48.061    Degeneration of intervertebral disc of lumbar region M51.36    Cigarette nicotine dependence without complication X70.814    Primary insomnia F51.01    Pulmonary nodule, left R91.1    Stage 3 chronic kidney disease N18.30      Past Medical History:   Diagnosis Date    Back pain     Cardiomyopathy (HCC)     LVEF 45% (2019)    Chronic obstructive pulmonary disease (HCC)     [facility]     History of loop recorder 08/2020    Hypercholesterolemia     Hypertension     Pulmonary hypertension (HCC)     Sleep apnea     Does not use CPAP    Tobacco abuse       Past Surgical History:   Procedure Laterality Date    COLONOSCOPY N/A 1/23/2017    COLONOSCOPY performed by Joshua Gill MD at [address] [place] [address] [address]  2003    umbilacal     HX SHOULDER ARTHROSCOPY  2004    bilateral    [place] INSERTION SUBQ CARDIAC RHYTHM MONITOR W/[facility] N/A 8/10/2020    LOOP RECORDER INSERT performed by Ana Humphrey MD at [address] [place] [address] LAB      Family History   Problem Relation Age of Onset   [de-identified] Cancer Mother     Hypertension Mother     Heart Disease Mother     Diabetes Mother     No Known Problems Father      Social History     Socioeconomic History    Marital status:      Spouse name: Not on file    Number of children: Not on file    Years of education: Not on file    Highest education level: Not on file   Occupational History    Not on file   Social Needs    Financial resource strain: Not on file    Food insecurity     Worry: Not on file     Inability: Not on file    Transportation needs     Medical: Not on file     Non-medical: Not on file   Tobacco Use    Smoking status: Current Every Day Smoker     Packs/day: 0.50     Years: 35.00     Pack years: 17.50     Types: Cigarettes     Start date: 4/9/1986    Smokeless tobacco: Former User     Quit date: 3/16/2016    Tobacco comment: in process of quitting    Substance and Sexual Activity    Alcohol use:  Yes     Alcohol/week: 4.0 standard drinks     Types: 4 Shots of liquor per week    Drug use: No    Sexual activity: Yes     Partners: Female   Lifestyle    Physical activity     Days per week: Not on file     Minutes per session: Not on file    Stress: Not on file   Relationships    Social connections     Talks on phone: Not on file     Gets together: Not on file     Attends [employer] service: Not on file     Active member of club or organization: Not on file     Attends meetings of clubs or organizations: Not on file     Relationship status: Not on file    Intimate partner violence     Fear of current or ex partner: Not on file     Emotionally abused: Not on file     Physically abused: Not on file     Forced sexual activity: Not on file   Other Topics Concern    Not on file   Social History Narrative    Not on file        Current Outpatient Medications   Medication Sig Dispense Refill    furosemide (Lasix) 20 mg tablet Take 1 Tab by mouth daily. 30 Tab 0    metFORMIN (GLUCOPHAGE) 500 mg tablet TAKE 1 TABLET BY MOUTH TWICE DAILY WITH MEALS 180 Tab 1    hydroCHLOROthiazide (HYDRODIURIL) 25 mg tablet Take 1 tablet by mouth once daily 90 Tab 1    carvediloL (COREG) 6.25 mg tablet TAKE 1 TABLET BY MOUTH TWICE DAILY WITH MEALS 180 Tab 2    cetirizine (Allergy Relief, cetirizine,) 10 mg tablet Take 1 tablet by mouth once daily 90 Tab 2    pravastatin (PRAVACHOL) 40 mg tablet Take 40 mg by mouth nightly.  cyclobenzaprine (FLEXERIL) 10 mg tablet Take 1 Tab by mouth three (3) times daily as needed for Muscle Spasm(s). 90 Tab 0    clobetasoL (TEMOVATE) 0.05 % ointment Apply  to affected area two (2) times a day. Use a thin film. 60 g 0    amitriptyline (ELAVIL) 50 mg tablet Take 1 Tab by mouth nightly. 90 Tab 1    Advair Diskus 250-50 mcg/dose diskus inhaler       Spiriva with HandiHaler 18 mcg inhalation capsule       spironolactone (ALDACTONE) 25 mg tablet       amLODIPine (NORVASC) 10 mg tablet Take 1 Tab by mouth daily. 90 Tab 0    atorvastatin (LIPITOR) 40 mg tablet Take 1 tablet by mouth once daily 90 Tab 2    albuterol (PROVENTIL HFA, VENTOLIN HFA, PROAIR HFA) 90 mcg/actuation inhaler Take 2 Puffs by inhalation every four (4) hours as needed for Wheezing or Shortness of Breath. 1 Inhaler 3    aspirin 81 mg chewable tablet Take 1 Tab by mouth daily. 90 Tab 1    losartan (COZAAR) 50 mg tablet Take 1 Tab by mouth daily. (Patient taking differently: Take 100 mg by mouth daily.) 30 Tab 3    gabapentin (NEURONTIN) 300 mg capsule Take 1 Cap by mouth four (4) times daily.  (Patient taking differently: Take 300 mg by mouth two (2) times a day.) 120 Cap 3    cholecalciferol (VITAMIN D3) 1,000 unit tablet Take 1,000 Units by mouth daily.  glipiZIDE (GLUCOTROL) 10 mg tablet Take 10 mg by mouth three (3) times daily. ROS   Review of Systems   Constitutional: Negative for chills, fever and malaise/fatigue. HENT: Negative for congestion, ear discharge and ear pain. Eyes: Negative for blurred vision, pain and discharge. Respiratory: Negative for cough and shortness of breath. Cardiovascular: Positive for leg swelling. Negative for chest pain and palpitations. Gastrointestinal: Negative for abdominal pain, nausea and vomiting. Genitourinary: Negative for dysuria, frequency and urgency. Skin: Negative for itching and rash. Neurological: Negative for dizziness, seizures, loss of consciousness and headaches. Psychiatric/Behavioral: Negative for substance abuse. Objective:  Vitals:    03/09/21 1103   BP: 110/68   Pulse: 95   Resp: 16   Temp: (!) 94.8 °F (34.9 °C)   TempSrc: Temporal   SpO2: 91%   Weight: 210 lb 3.2 oz (95.3 kg)   Height: 5' 9\" (1.753 m)   PainSc:   8   PainLoc: Leg       Physical Exam  Constitutional:       General: He is not in acute distress. Appearance: Normal appearance. He is not ill-appearing. HENT:      Nose: Nose normal. No congestion or rhinorrhea. Eyes:      General:         Right eye: No discharge. Left eye: No discharge. Conjunctiva/sclera: Conjunctivae normal.   Neck:      Musculoskeletal: Normal range of motion and neck supple. Pulmonary:      Effort: Pulmonary effort is normal.   Musculoskeletal:      Right lower leg: Edema present. Left lower leg: Edema (L>R) present. Neurological:      Mental Status: He is alert and oriented to person, place, and time. Psychiatric:         Mood and Affect: Mood normal.         Behavior: Behavior normal.         Thought Content:  Thought content normal.         Judgment: Judgment normal.           LABS   BASIC METABOLIC PANELResulted: 3/1/2021 11:35 AM  [facility]  Component Name Value Ref Range   Potassium 4.4 3.5 - 5.5 mmol/L   Sodium 135 133 - 145 mmol/L   Chloride 96 (L) 98 - 110 mmol/L   Glucose 181 (H) 70 - 99 mg/dL   Calcium 9.4 8.4 - 10.5 mg/dL   BUN 17 6 - 22 mg/dL   Creatinine 1.2 0.5 - 1.2 mg/dL   CO2 33 (H) 20 - 32 mmol/L   eGFR African American >60.0 >60.0    eGFR Non  59.9 (L) >60.0    Anion Gap 6.5   Comment: Anion Gap calculation based on electrolyte reference ranges. 3 - 15 mmol/L       TESTS      Assessment/Plan:    1. Leg swelling  Discussed that he needs to raise his legs while sitting. Ordered compression socks for him. He is to take Lasix once a day. Normal Cr, and GFR. Keep appt w/ Dr. Tha Wolf on 3/15/21. HE verbalized understanding.   - furosemide (Lasix) 20 mg tablet; Take 1 Tab by mouth daily. Dispense: 30 Tab; Refill: 0  - AMB SUPPLY ORDER           I have discussed the diagnosis with the patient and the intended plan as seen in the above orders. The patient has received an after-visit summary and questions were answered concerning future plans. I have discussed medication side effects and warnings with the patient as well. I have reviewed the plan of care with the patient, accepted their input and they are in agreement with the treatment goals.          Danuta Mitchell MD

## 2021-03-09 NOTE — PROGRESS NOTES
Amber Teran presents today for   Chief Complaint   Patient presents with    Leg Swelling     bilateral mostly left, per pt skin feels tight like it's hurting the bone        Is someone accompanying this pt? no    Is the patient using any DME equipment during OV? Portable oxygen tank    Depression Screening:  3 most recent PHQ Screens 3/9/2021   Little interest or pleasure in doing things Not at all   Feeling down, depressed, irritable, or hopeless Not at all   Total Score PHQ 2 0       Learning Assessment:  Learning Assessment 7/12/2018   PRIMARY LEARNER Patient   HIGHEST LEVEL OF EDUCATION - PRIMARY LEARNER  GRADUATED HIGH SCHOOL OR GED   BARRIERS PRIMARY LEARNER NONE   CO-LEARNER CAREGIVER No   PRIMARY LANGUAGE ENGLISH   LEARNER PREFERENCE PRIMARY LISTENING     READING     DEMONSTRATION   ANSWERED BY patient   RELATIONSHIP SELF       Abuse Screening:  Abuse Screening Questionnaire 7/12/2018   Do you ever feel afraid of your partner? N   Are you in a relationship with someone who physically or mentally threatens you? N   Is it safe for you to go home? Y       Fall Risk  Fall Risk Assessment, last 12 mths 9/9/2020   Able to walk? Yes   Fall in past 12 months? No       Health Maintenance reviewed and discussed and ordered per Provider. Health Maintenance Due   Topic Date Due    Eye Exam Retinal or Dilated  Never done    COVID-19 Vaccine (1 of 2) Never done    Shingrix Vaccine Age 50> (2 of 2) 03/16/2020    Foot Exam Q1  06/25/2020    A1C test (Diabetic or Prediabetic)  12/09/2020    MICROALBUMIN Q1  01/20/2021    Lipid Screen  01/20/2021   . Coordination of Care:  1. Have you been to the ER, urgent care clinic since your last visit? Hospitalized since your last visit? no    2. Have you seen or consulted any other health care providers outside of the [address] [place] [address] since your last visit? Include any pap smears or colon screening.  no      Last  Checked no  Last UDS Checked no  Last Pain contract signed: no

## 2021-03-09 NOTE — PATIENT INSTRUCTIONS
Leg and Ankle Edema: Care Instructions Your Care Instructions Swelling in the legs, ankles, and feet is called edema. It is common after you sit or stand for a while. Long plane flights or car rides often cause swelling in the legs and feet. You may also have swelling if you have to stand for long periods of time at your job. Problems with the veins in the legs (varicose veins) and changes in hormones can also cause swelling. Sometimes the swelling in the ankles and feet is caused by a more serious problem, such as heart failure, infection, blood clots, or liver or kidney disease. Follow-up care is a key part of your treatment and safety. Be sure to make and go to all appointments, and call your doctor if you are having problems. It's also a good idea to know your test results and keep a list of the medicines you take. How can you care for yourself at home? · If your doctor gave you medicine, take it as prescribed. Call your doctor if you think you are having a problem with your medicine. · Whenever you are resting, raise your legs up. Try to keep the swollen area higher than the level of your heart. · Take breaks from standing or sitting in one position. ? Walk around to increase the blood flow in your lower legs. ? Move your feet and ankles often while you stand, or tighten and relax your leg muscles. · Wear support stockings. Put them on in the morning, before swelling gets worse. · Eat a balanced diet. Lose weight if you need to. · Limit the amount of salt (sodium) in your diet. Salt holds fluid in the body and may increase swelling. When should you call for help? Call 911 anytime you think you may need emergency care. For example, call if: 
  · You have symptoms of a blood clot in your lung (called a pulmonary embolism). These may include: 
? Sudden chest pain. ? Trouble breathing. ? Coughing up blood.   
Call your doctor now or seek immediate medical care if: 
  · You have signs of a blood clot, such as: 
? Pain in your calf, back of the knee, thigh, or groin. ? Redness and swelling in your leg or groin.  
  · You have symptoms of infection, such as: 
? Increased pain, swelling, warmth, or redness. ? Red streaks or pus. ? A fever. Watch closely for changes in your health, and be sure to contact your doctor if: 
  · Your swelling is getting worse.  
  · You have new or worsening pain in your legs.  
  · You do not get better as expected. Where can you learn more? Go to http://www.gray.com/ Enter L345 in the search box to learn more about \"Leg and Ankle Edema: Care Instructions. \" Current as of: June 26, 2019               Content Version: 12.6 © 6407-7699 [employer]. Care instructions adapted under license by [employer] (which disclaims liability or warranty for this information). If you have questions about a medical condition or this instruction, always ask your healthcare professional. [place] [address] any warranty or liability for your use of this information.

## 2021-03-10 ENCOUNTER — TELEPHONE (OUTPATIENT)
Dept: FAMILY MEDICINE CLINIC | Age: 61
End: 2021-03-10

## 2021-03-10 NOTE — TELEPHONE ENCOUNTER
Patient is requesting an referral to see a neurologist due to passing out off and on at times, Patient said he has spoken to Dr. Luli White awhile back and  forgot to talk to Dr. Inge Myers on his appt. 3/10/2021. Patient would like a call back.

## 2021-03-11 NOTE — TELEPHONE ENCOUNTER
Pt. Is checking the status of neurology referral due to his seizures and he fell twice in his yard and he had a seizure this morning.  Advised Dr. Ramandeep Chua is out of office and he is asking if another doctor can put in the referral. Please assist.

## 2021-03-12 DIAGNOSIS — B35.3 TINEA PEDIS OF BOTH FEET: Primary | ICD-10-CM

## 2021-03-12 RX ORDER — KETOCONAZOLE 20 MG/G
CREAM TOPICAL DAILY
Qty: 15 G | Refills: 0 | Status: CANCELLED | OUTPATIENT
Start: 2021-03-12

## 2021-03-13 RX ORDER — BUTENAFINE HYDROCHLORIDE 10 MG/G
CREAM TOPICAL DAILY
Qty: 30 G | Refills: 0 | Status: SHIPPED | OUTPATIENT
Start: 2021-03-13

## 2021-03-15 ENCOUNTER — VIRTUAL VISIT (OUTPATIENT)
Dept: FAMILY MEDICINE CLINIC | Age: 61
End: 2021-03-15
Payer: MEDICARE

## 2021-03-15 DIAGNOSIS — M79.89 LEG SWELLING: ICD-10-CM

## 2021-03-15 DIAGNOSIS — I10 ESSENTIAL HYPERTENSION: ICD-10-CM

## 2021-03-15 DIAGNOSIS — G25.2 COARSE TREMORS: ICD-10-CM

## 2021-03-15 DIAGNOSIS — R60.0 LEG EDEMA, LEFT: ICD-10-CM

## 2021-03-15 DIAGNOSIS — N18.31 STAGE 3A CHRONIC KIDNEY DISEASE (HCC): ICD-10-CM

## 2021-03-15 DIAGNOSIS — I42.9 CARDIOMYOPATHY, UNSPECIFIED TYPE (HCC): ICD-10-CM

## 2021-03-15 DIAGNOSIS — E78.00 HYPERCHOLESTEREMIA: ICD-10-CM

## 2021-03-15 DIAGNOSIS — E11.40 TYPE 2 DIABETES MELLITUS WITH DIABETIC NEUROPATHY, WITHOUT LONG-TERM CURRENT USE OF INSULIN (HCC): Primary | ICD-10-CM

## 2021-03-15 DIAGNOSIS — J43.9 PULMONARY EMPHYSEMA, UNSPECIFIED EMPHYSEMA TYPE (HCC): ICD-10-CM

## 2021-03-15 PROCEDURE — 3017F COLORECTAL CA SCREEN DOC REV: CPT | Performed by: INTERNAL MEDICINE

## 2021-03-15 PROCEDURE — G8417 CALC BMI ABV UP PARAM F/U: HCPCS | Performed by: INTERNAL MEDICINE

## 2021-03-15 PROCEDURE — 2022F DILAT RTA XM EVC RTNOPTHY: CPT | Performed by: INTERNAL MEDICINE

## 2021-03-15 PROCEDURE — G8510 SCR DEP NEG, NO PLAN REQD: HCPCS | Performed by: INTERNAL MEDICINE

## 2021-03-15 PROCEDURE — 3046F HEMOGLOBIN A1C LEVEL >9.0%: CPT | Performed by: INTERNAL MEDICINE

## 2021-03-15 PROCEDURE — G8756 NO BP MEASURE DOC: HCPCS | Performed by: INTERNAL MEDICINE

## 2021-03-15 PROCEDURE — G8427 DOCREV CUR MEDS BY ELIG CLIN: HCPCS | Performed by: INTERNAL MEDICINE

## 2021-03-15 PROCEDURE — G0463 HOSPITAL OUTPT CLINIC VISIT: HCPCS | Performed by: INTERNAL MEDICINE

## 2021-03-15 PROCEDURE — 99214 OFFICE O/P EST MOD 30 MIN: CPT | Performed by: INTERNAL MEDICINE

## 2021-03-15 RX ORDER — FUROSEMIDE 40 MG/1
40 TABLET ORAL DAILY
Qty: 90 TAB | Refills: 0 | Status: SHIPPED | OUTPATIENT
Start: 2021-03-15

## 2021-03-15 NOTE — PROGRESS NOTES
Jason Cruz is a 63 y.o. male who was seen by synchronous (real-time) audio-video technology using doxy. me on 3/15/2021. Location of the patient: Home    Location of the provider: [facility]    Consent:  He and/or health care decision maker is aware that that he may receive a bill for this telehealth service, depending on his insurance coverage, and has provided verbal consent to proceed: Yes    Subjective:   Jason Cruz is a 63 y.o. male who presents today for management of    Chief Complaint   Patient presents with    Peripheral Edema       Patient presents for follow of leg edema, left worse than the right. Current therapy: furosemide 20mg daily for 2 weeks. Patient reports of episodes where he would have whole-body tremors. Episodes usually last several seconds. The tremors are so violent that he would fall from his chair. He is completely aware of the situation and denies postictal symptoms. He used to have episodes of syncope. Work up has been negative so far, including EEG, loop monitor (implanted 8/2020). Syncope is thought to be situation, due to excessive coughing. His furosemide was stopped for a while due to possibly causing hypotension. Patient has history of COPD. He is currently on home oxygen. He has stable dyspnea and cough. Diabetes Mellitus:  He has diabetes mellitus, and  hypertension and hyperlipidemia. Diabetic ROS - medication compliance: compliant all of the time, diabetic diet compliance: compliant most of the time, home glucose monitoring: values are usually normal, further diabetic ROS: no polyuria or polydipsia, no chest pain, dyspnea or TIA's, no numbness, tingling or pain in extremities. Lab review: orders written for new lab studies as appropriate; see orders.      Problem List  Patient Active Problem List    Diagnosis Date Noted    Stage 3 chronic kidney disease 09/10/2020    Pulmonary nodule, left 02/06/2020    Primary insomnia 01/20/2020    Degeneration of intervertebral disc of lumbar region 10/22/2019    Cigarette nicotine dependence without complication 17/26/5569    Spinal stenosis of lumbar region 06/25/2019    Cardiomyopathy ([facility] 04/23/2019    Other proteinuria 03/07/2019    Type 2 diabetes mellitus with diabetic neuropathy ([facility] 02/09/2018    Clubbing of nail 03/23/2016    Chronic obstructive pulmonary disease ([facility] 03/23/2016    JAIMEE (obstructive sleep apnea) 09/17/2015    Midline low back pain without sciatica 09/03/2015    Essential hypertension 09/03/2015    Hypercholesteremia 09/03/2015    Hip pain 09/03/2015       Current Medications  Current Outpatient Medications   Medication Sig    furosemide (Lasix) 40 mg tablet Take 1 Tab by mouth daily.  butenafine (MENTAX) 1 % topical cream Apply  to affected area daily.  metFORMIN (GLUCOPHAGE) 500 mg tablet TAKE 1 TABLET BY MOUTH TWICE DAILY WITH MEALS    hydroCHLOROthiazide (HYDRODIURIL) 25 mg tablet Take 1 tablet by mouth once daily    carvediloL (COREG) 6.25 mg tablet TAKE 1 TABLET BY MOUTH TWICE DAILY WITH MEALS    cetirizine (Allergy Relief, cetirizine,) 10 mg tablet Take 1 tablet by mouth once daily    pravastatin (PRAVACHOL) 40 mg tablet Take 40 mg by mouth nightly.  cyclobenzaprine (FLEXERIL) 10 mg tablet Take 1 Tab by mouth three (3) times daily as needed for Muscle Spasm(s).  clobetasoL (TEMOVATE) 0.05 % ointment Apply  to affected area two (2) times a day. Use a thin film.  amitriptyline (ELAVIL) 50 mg tablet Take 1 Tab by mouth nightly.  Advair Diskus 250-50 mcg/dose diskus inhaler     Spiriva with HandiHaler 18 mcg inhalation capsule     amLODIPine (NORVASC) 10 mg tablet Take 1 Tab by mouth daily.     atorvastatin (LIPITOR) 40 mg tablet Take 1 tablet by mouth once daily    albuterol (PROVENTIL HFA, VENTOLIN HFA, PROAIR HFA) 90 mcg/actuation inhaler Take 2 Puffs by inhalation every four (4) hours as needed for Wheezing or Shortness of Breath.  aspirin 81 mg chewable tablet Take 1 Tab by mouth daily.  losartan (COZAAR) 50 mg tablet Take 1 Tab by mouth daily. (Patient taking differently: Take 100 mg by mouth daily.)    gabapentin (NEURONTIN) 300 mg capsule Take 1 Cap by mouth four (4) times daily. (Patient taking differently: Take 300 mg by mouth two (2) times a day.)    cholecalciferol (VITAMIN D3) 1,000 unit tablet Take 1,000 Units by mouth daily.  glipiZIDE (GLUCOTROL) 10 mg tablet Take 10 mg by mouth three (3) times daily. No current facility-administered medications for this visit. Allergies/Drug Reactions  Allergies   Allergen Reactions    Lisinopril Swelling        Social History  Social History     Tobacco Use    Smoking status: Current Every Day Smoker     Packs/day: 0.50     Years: 35.00     Pack years: 17.50     Types: Cigarettes     Start date: 4/9/1986    Smokeless tobacco: Former User     Quit date: 3/16/2016    Tobacco comment: in process of quitting    Substance Use Topics    Alcohol use: Yes     Alcohol/week: 4.0 standard drinks     Types: 4 Shots of liquor per week    Drug use: No        Review of Systems  Review of Systems   Constitutional: Negative for chills, fever, malaise/fatigue and weight loss. Respiratory: Positive for shortness of breath. Cardiovascular: Positive for leg swelling. Negative for chest pain and palpitations. Gastrointestinal: Negative. Neurological: Positive for tremors. Negative for dizziness and headaches. Psychiatric/Behavioral: Negative for depression. The patient is not nervous/anxious.             Objective:     General: alert, cooperative, no distress   Mental  status: mental status: alert, oriented to person, place, and time, normal mood, behavior, speech, dress, motor activity, and thought processes   Resp: resp: normal effort and no respiratory distress   Neuro: neuro: no gross deficits   Skin: skin: no discoloration or lesions of concern on visible areas     Due to this being a TeleHealth evaluation, many elements of the physical examination are unable to be assessed. Assessment & Plan:   1. Coarse tremors  - ? Etiology  - advised to follow-up with neurology for evaluation    2. Leg edema, left  - leg elevation  - DUPLEX LOWER EXT VENOUS LEFT; Future    3. Type 2 diabetes mellitus with diabetic neuropathy, without long-term current use of insulin ([facility]  - controled  - HEMOGLOBIN A1C WITH EAG; Future  - MICROALBUMIN, UR, RAND W/ MICROALB/CREAT RATIO; Future    4. Pulmonary emphysema, unspecified emphysema type ([address]  - on home oxygen   - pulmo follow-up    5. Cardiomyopathy, unspecified type ([facility]  - most recent EF 40-45%    6. Stage 3a chronic kidney disease  - stable    7. Essential hypertension  - controlled    8. Hypercholesteremia  - LIPID PANEL; Future    9. Leg swelling  - leg elevation, salt restriction  - furosemide (Lasix) 40 mg tablet; Take 1 Tab by mouth daily. Dispense: 90 Tab; Refill: 0      Follow-up and Dispositions    · Return in about 2 weeks (around 3/29/2021) for in-person, EOV. We discussed the expected course, resolution and complications of the diagnosis(es) in detail. Medication risks, benefits, costs, interactions, and alternatives were discussed as indicated. I advised him to contact the office if his condition worsens, changes or fails to improve as anticipated. He expressed understanding with the diagnosis(es) and plan. Pursuant to the emergency declaration under the [facility], [facility] waiver authority and the [employer] Act, this Virtual  Visit was conducted, with patient's consent, to reduce the patient's risk of exposure to COVID-19 and provide continuity of care for an established patient.      Services were provided through a video synchronous discussion virtually to substitute for in-person clinic visit.     Tono Delgado MD

## 2021-03-22 ENCOUNTER — HOSPITAL ENCOUNTER (OUTPATIENT)
Dept: VASCULAR SURGERY | Age: 61
Discharge: HOME | End: 2021-03-22
Attending: INTERNAL MEDICINE
Payer: MEDICARE

## 2021-03-22 PROCEDURE — 93971 EXTREMITY STUDY: CPT

## 2021-03-25 ENCOUNTER — TELEPHONE (OUTPATIENT)
Dept: FAMILY MEDICINE CLINIC | Age: 61
End: 2021-03-25

## 2021-04-01 ENCOUNTER — OFFICE VISIT (OUTPATIENT)
Dept: CARDIOLOGY CLINIC | Age: 61
End: 2021-04-01
Payer: MEDICARE

## 2021-04-01 VITALS
WEIGHT: 206.6 LBS | DIASTOLIC BLOOD PRESSURE: 73 MMHG | HEIGHT: 69 IN | HEART RATE: 95 BPM | OXYGEN SATURATION: 96 % | RESPIRATION RATE: 18 BRPM | SYSTOLIC BLOOD PRESSURE: 108 MMHG | TEMPERATURE: 97.6 F | BODY MASS INDEX: 30.6 KG/M2

## 2021-04-01 DIAGNOSIS — R55 SYNCOPE AND COLLAPSE: Primary | ICD-10-CM

## 2021-04-01 DIAGNOSIS — I42.9 CARDIOMYOPATHY, UNSPECIFIED TYPE (HCC): ICD-10-CM

## 2021-04-01 DIAGNOSIS — I27.20 PULMONARY HTN (HCC): ICD-10-CM

## 2021-04-01 DIAGNOSIS — Z95.9 CARDIAC DEVICE IN SITU: ICD-10-CM

## 2021-04-01 PROCEDURE — G8752 SYS BP LESS 140: HCPCS | Performed by: INTERNAL MEDICINE

## 2021-04-01 PROCEDURE — G8417 CALC BMI ABV UP PARAM F/U: HCPCS | Performed by: INTERNAL MEDICINE

## 2021-04-01 PROCEDURE — 99214 OFFICE O/P EST MOD 30 MIN: CPT | Performed by: INTERNAL MEDICINE

## 2021-04-01 PROCEDURE — G8754 DIAS BP LESS 90: HCPCS | Performed by: INTERNAL MEDICINE

## 2021-04-01 PROCEDURE — 3017F COLORECTAL CA SCREEN DOC REV: CPT | Performed by: INTERNAL MEDICINE

## 2021-04-01 PROCEDURE — G8428 CUR MEDS NOT DOCUMENT: HCPCS | Performed by: INTERNAL MEDICINE

## 2021-04-01 PROCEDURE — G8432 DEP SCR NOT DOC, RNG: HCPCS | Performed by: INTERNAL MEDICINE

## 2021-04-01 RX ORDER — AMLODIPINE BESYLATE 5 MG/1
5 TABLET ORAL DAILY
Qty: 90 TAB | Refills: 3 | Status: SHIPPED | OUTPATIENT
Start: 2021-04-01

## 2021-04-01 NOTE — PROGRESS NOTES
[facility]    Mr. Poonam Beal is a 63 y.o. male with a history of COPD, diabetes, hypertension, hyperlipidemia, sleep apnea and pulmonary hypertension    Patient is here today for follow-up appointment. Recently was admitted to the hospital with syncopal episode. Extensive work-up was performed  He was found to have a cocaine in his system. It was thought because of hypoxia and probably cocaine induced  Continues to be on home oxygen  Tells me that his loop recorder is not working  Takes medication for edema  Denies any nausea, vomiting, abdominal pain, fever, chills, sputum production. No hematuria or other bleeding complaints    Past Medical History:   Diagnosis Date    Back pain     Cardiomyopathy ([address]     LVEF 45% (2019)    [facility]     Diabetes [facility]     History of loop recorder 08/2020    Hypercholesterolemia     Hypertension     Pulmonary hypertension ([facility]     Sleep apnea     Does not use CPAP    Tobacco abuse          Past Surgical History:   Procedure Laterality Date    COLONOSCOPY N/A 1/23/2017    COLONOSCOPY performed by Michele Donahue MD at [address] [place] [address]  2003    umbilacal     HX SHOULDER ARTHROSCOPY  2004    bilateral    AR INSERTION SUBQ CARDIAC RHYTHM MONITOR W/PRGRMG N/A 8/10/2020    LOOP RECORDER INSERT performed by Hector Sandhu MD at [facility] CATH LAB       Current Outpatient Medications   Medication Sig    furosemide (Lasix) 40 mg tablet Take 1 Tab by mouth daily.  butenafine (MENTAX) 1 % topical cream Apply  to affected area daily.     metFORMIN (GLUCOPHAGE) 500 mg tablet TAKE 1 TABLET BY MOUTH TWICE DAILY WITH MEALS    hydroCHLOROthiazide (HYDRODIURIL) 25 mg tablet Take 1 tablet by mouth once daily    carvediloL (COREG) 6.25 mg tablet TAKE 1 TABLET BY MOUTH TWICE DAILY WITH MEALS    cetirizine (Allergy Relief, cetirizine,) 10 mg tablet Take 1 tablet by mouth once daily    pravastatin (PRAVACHOL) 40 mg tablet Take 40 mg by mouth nightly.  cyclobenzaprine (FLEXERIL) 10 mg tablet Take 1 Tab by mouth three (3) times daily as needed for Muscle Spasm(s).  clobetasoL (TEMOVATE) 0.05 % ointment Apply  to affected area two (2) times a day. Use a thin film.  amitriptyline (ELAVIL) 50 mg tablet Take 1 Tab by mouth nightly.  Advair Diskus 250-50 mcg/dose diskus inhaler     Spiriva with HandiHaler 18 mcg inhalation capsule     amLODIPine (NORVASC) 10 mg tablet Take 1 Tab by mouth daily.  atorvastatin (LIPITOR) 40 mg tablet Take 1 tablet by mouth once daily    albuterol (PROVENTIL HFA, VENTOLIN HFA, PROAIR HFA) 90 mcg/actuation inhaler Take 2 Puffs by inhalation every four (4) hours as needed for Wheezing or Shortness of Breath.  aspirin 81 mg chewable tablet Take 1 Tab by mouth daily.  losartan (COZAAR) 50 mg tablet Take 1 Tab by mouth daily. (Patient taking differently: Take 100 mg by mouth daily.)    gabapentin (NEURONTIN) 300 mg capsule Take 1 Cap by mouth four (4) times daily. (Patient taking differently: Take 300 mg by mouth two (2) times a day.)    cholecalciferol (VITAMIN D3) 1,000 unit tablet Take 1,000 Units by mouth daily.  glipiZIDE (GLUCOTROL) 10 mg tablet Take 10 mg by mouth three (3) times daily. No current facility-administered medications for this visit.         Allergies and Sensitivities:  Allergies   Allergen Reactions    Lisinopril Swelling       Family History:  Family History   Problem Relation Age of Onset   Ramona Cuevas Cancer Mother     Hypertension Mother     Heart Disease Mother     Diabetes Mother     No Known Problems Father        Social History:  Social History     Tobacco Use    Smoking status: Current Every Day Smoker     Packs/day: 0.50     Years: 35.00     Pack years: 17.50     Types: Cigarettes     Start date: 4/9/1986    Smokeless tobacco: Former User     Quit date: 3/16/2016    Tobacco comment: in process of quitting    Substance Use Topics    Alcohol use: Yes     Alcohol/week: 4.0 standard drinks     Types: 4 Shots of liquor per week    Drug use: No     He  reports that he has been smoking cigarettes. He started smoking about 35 years ago. He has a 17.50 pack-year smoking history. He quit smokeless tobacco use about 5 years ago. He  reports current alcohol use of about 4.0 standard drinks of alcohol per week. Review of Systems:  Cardiac symptoms as noted above in HPI. All others negative. Denies fatigue, malaise, skin rash, joint pain, blurring vision, photophobia, neck pain, hemoptysis, chronic cough, nausea, vomiting, hematuria, burning micturition, BRBPR, chronic headaches. Physical Exam:  BP Readings from Last 3 Encounters:   04/01/21 108/73   03/09/21 110/68   09/09/20 129/84         Pulse Readings from Last 3 Encounters:   04/01/21 95   03/09/21 95   09/09/20 (!) 113          Wt Readings from Last 3 Encounters:   04/01/21 206 lb 9.6 oz (93.7 kg)   03/09/21 210 lb 3.2 oz (95.3 kg)   09/09/20 206 lb (93.4 kg)       Constitutional: Oriented to person, place, and time. HENT: Head: Normocephalic and atraumatic. Neck: No JVD present. Carotid bruit is not appreciated. Cardiovascular: Regular rhythm. No murmur, gallop or rubs appreciated  Lung: Breath sounds normal. No respiratory distress. No ronchi or rales appreciated  Abdominal: No tenderness. No rebound and no guarding. Musculoskeletal: There is a/1+ lower extremity edema.  No cynosis    Review of Data  LABS:   Lab Results   Component Value Date/Time    Sodium 137 09/09/2020 10:47 AM    Potassium 5.5 09/09/2020 10:47 AM    Chloride 103 09/09/2020 10:47 AM    CO2 30 09/09/2020 10:47 AM    Glucose 207 (H) 09/09/2020 10:47 AM    BUN 33 (H) 09/09/2020 10:47 AM    Creatinine 1.53 (H) 09/09/2020 10:47 AM     Lipids Latest Ref Rng & Units 1/20/2020 1/22/2019 9/11/2018 5/8/2018 12/22/2017   Chol, Total <200 MG/ 194 194 195 187 HDL 40 - 60 MG/DL 37(L) 46 37(L) 51 58   LDL 0 - 100 MG/. 6(H) 109. 2(H) 82.8 98.4 90.6   Trig <150 MG/(H) 194(H) 371(H) 228(H) 192(H)   Chol/HDL Ratio 0 - 5.0   4.9 4.2 5.2(H) 3.8 3.2   Some recent data might be hidden     Lab Results   Component Value Date/Time    ALT (SGPT) 18 08/05/2020 08:24 AM     Lab Results   Component Value Date/Time    Hemoglobin A1c 9.3 (H) 09/09/2020 10:47 AM    Hemoglobin A1c (POC) 6.7 06/25/2019 10:24 AM       EKG    ECHO (01/20)  Left Ventricle Normal cavity size, wall thickness and diastolic function. Mild-to-moderate systolic dysfunction. The estimated ejection fraction is 40 - 45%. Visually measured ejection fraction. Global hypokinesis observed. Wall Scoring The left ventricular wall motion is globally hypokinetic. ECHO (02/2021)    * Normal left heart chamber sizes and left ventricular wall thickness.    * Left ventricular systolic function is reduced with an ejection fraction of 44 % by Lee's biplane.    * The left ventricular diastolic function is indeterminate due to septal flattening. .    * Global left ventricular hypokinesis.    * Flattening of the septum in diastole and systole consistent with right ventricular volume and pressure overload.    * Interventricular septal bounce noted.    * Severely enlarged right heart chamber sizes.    * Right ventricular systolic function is reduced with TAPSE measuring 1.31 cm.    * There is moderate tricuspid valve regurgitation.    * Dilated inferior vena cava with <50% collapse upon inspiration. Right atrial pressure, 15 mmHg.    * Estimated pulmonary arterial systolic pressure is 68 mmHg. STRESS TEST (01/20)  · Baseline ECG: Normal sinus rhythm. · Gated SPECT: Left ventricular function post-stress was abnormal. Calculated ejection fraction is 38%.   · Myocardial perfusion imaging defect 1: There is a defect that is small to moderate in size present in the inferior location(s) that is predominantly fixed. The possibility of artifact and infarction cannot be excluded. · There was no convincing evidence of significant reversible defect to suggest on going major ischemia. · Myocardial perfusion imaging supports an intermediate risk stress test.    Event monitor (5/20)  Sinus rhythm with average heart rate of 110 bpm with radiation. No A. fib or pauses noted. PVC burden 1%. One episode of 5 beat asymptomatic NSVT    CATHETERIZATION (02/20)  Left Main   The vessel is angiographically normal.   Left Anterior Descending   Fluoroscopic calcification noted. Mild 20-30% luminal irregularities. Diagonal branches without any significant obstructive disease. LAD wraps around the apex and supplied distal inferior wall   Ramus Intermedius   Smallmedium caliber vessel without any obstructive disease   Left Circumflex   The vessel is angiographically normal. OM branch: Normal   Right Coronary Artery   Proximal-mid borderline eccentric 60-70% discrete stenosis IFR of proximal-mid RCA lesion was 0.96 suggesting physiologically not significant stenosis. Medical management is recommended     Left Ventricle The left ventricular size is normal. The left ventricular systolic function is normal. LV end diastolic pressure is normal. LV EDP is: 10. The estimated EF = 40 - 45%. Aortic Valve There is no aortic valve stenosis. Wall Motion          Right Heart     Right Heart Cath Right heart cath Hemodynamics:     Right atrial: 10mmHg  Right ventricle: 47/2/8 mmHg  Pulmonary artery: 48/19/31 mmHg  Pulmonary capillary wedge pressure: mean 10 mmHG  Cardiac output: 4.95 L/min by Chey  Cardiac index: 2.41 L/min by Chey    Oxygen saturation (%):   RA:58  PA:59  FA / LV/ RADIAL:87  No evidence of intracardiac shunts by saturation measurements     IMPRESSION & PLAN:  Mr. Jessica Alfonso is 63 y. o.with a history of cardiomyopathy, hypertension, hyperlipidemia, diabetes, sleep apnea, pulmonary hypertension    Cardiomyopathy:  LVEF 45-50% in March 2019  Last echo with EF 4045% in January 2020  Non-Ischemic in nature, cardiac cath in February 2020, no significant obstructive disease. LVEF 45% by echo in 03/2021  NYHA class II symptoms. Currently on Coreg and losartan. Taking Lasix and hydrochlorothiazide. Have asked patient to take Lasix only as needed    Borderline CAD:  RCA lesion iFR, 0.96, physiologically not significant. No angina symptoms  Continue risk factor modification. Syncope: This was thought to be situational, posttussive syncope after excessive coughing spells. He was seen by neurology team.  His CT head and EEG has been unremarkable in 2020  EEG, MRI in 03/2021, unremarkable  Event monitor in May 2020, low risk as mentioned above  Loop recorder implanted in 08/20. Will interrogate loop recorder  Admitted in the hospital in 03/2021 with syncope. Extensive work-up was unremarkable. It was thought because of hypoxia and pulmonary hypertension and cocaine positive status  On going evens. Unable to figure out yet. Does not sound true syncope. It could be because of hypotension being on multiple medications. Reduce amlodipine from 10 mg to 5 mg dose. He does not know exact medication list.  I have asked him to come back in the clinic in 2 weeks with all the medications  Does not drive  Diabetes:  Goal hemoglobin A1c less than 7.5 is recommended from cardiovascular standpoint. Defer to PCP    Hyperlipidemia:  Last . Goal LDL 70 discussed with patient. Currently on atorvastatin 40. If this is not at goal in next 6-month, would recommend increasing dose    Hypertension:  Continue current medication    COPD/pulmonary hypertension:  Defer to Dr. Juan A Oshea. He is on home oxygen    Tobacco abuse:  Still smoke 8/10 cig a day. He is working towards quit smoking    This plan was discussed with patient who is in agreement. Thank you for allowing me to participate in patient care.  Please feel free to call me if you have any question or concern. Serenity Montgomery MD  Please note: This document has been produced using voice recognition software. Unrecognized errors in transcription may be present.

## 2021-04-01 NOTE — PROGRESS NOTES
Yeny Ansari presents today for   Chief Complaint   Patient presents with   [facility] Follow Up     Syncope and Collapse       Yeny Ansari preferred language for health care discussion is english/other. Personal Protective Equipment:   Personal Protective Equipment was used including: mask-surgical and hands-gloves. Patient was placed on no precaution(s). Patient was masked. Is someone accompanying this pt? No    Is the patient using any DME equipment during OV? Yes; Oxygen    Depression Screening:  3 most recent PHQ Screens 4/1/2021   Little interest or pleasure in doing things Not at all   Feeling down, depressed, irritable, or hopeless Not at all   Total Score PHQ 2 0       Learning Assessment:  Learning Assessment 7/12/2018   PRIMARY LEARNER Patient   HIGHEST LEVEL OF EDUCATION - PRIMARY LEARNER  GRADUATED HIGH SCHOOL OR GED   BARRIERS PRIMARY LEARNER NONE   CO-LEARNER CAREGIVER No   PRIMARY LANGUAGE ENGLISH   LEARNER PREFERENCE PRIMARY LISTENING     READING     DEMONSTRATION   ANSWERED BY patient   RELATIONSHIP SELF       Abuse Screening:  Abuse Screening Questionnaire 4/1/2021   Do you ever feel afraid of your partner? N   Are you in a relationship with someone who physically or mentally threatens you? N   Is it safe for you to go home? Y       Fall Risk  Fall Risk Assessment, last 12 mths 4/1/2021   Able to walk? Yes   Fall in past 12 months? 1   Do you feel unsteady? 1   Are you worried about falling 1   Is the gait abnormal? 1   Number of falls in past 12 months 2   Fall with injury? 1       Pt currently taking Anticoagulant therapy? No    Coordination of Care:  1. Have you been to the ER, urgent care clinic since your last visit? Hospitalized since your last visit? Yes; [facility] 3/22/2021 - Syncope and Collapse    2. Have you seen or consulted any other health care providers outside of the [address] [place] [address] [place] since your last visit?  Include any pap smears or colon screening.  No

## 2021-04-01 NOTE — PATIENT INSTRUCTIONS
New Medication/Medication Changes Norvasc decreased to 5 mg daily **please allow 24-48 hrs for medication to be escribed to pharmacy** If you need any refills on medications please contact your pharmacy so that the request can be escribed to the provider for review. Other BP check in 2 weeks(bring all meds) Loop interrogation

## 2021-04-01 NOTE — LETTER
4/1/2021 Patient: Virginia Schwartz YOB: 1960 Date of Visit: 4/1/2021 Nicole Bell MD 
[address] [place] [id number] [address] [employer] Dear Nicole Bell MD, Thank you for referring Mr. Parviz Villeda to [address] [place] [address] for evaluation. My notes for this consultation are attached. If you have questions, please do not hesitate to call me. I look forward to following your patient along with you. Sincerely, Solitario Wilson MD

## 2021-04-12 ENCOUNTER — CLINICAL SUPPORT (OUTPATIENT)
Dept: CARDIOLOGY CLINIC | Age: 61
End: 2021-04-12

## 2021-04-12 VITALS — HEART RATE: 102 BPM | DIASTOLIC BLOOD PRESSURE: 77 MMHG | SYSTOLIC BLOOD PRESSURE: 118 MMHG

## 2021-04-12 DIAGNOSIS — I10 ESSENTIAL HYPERTENSION: Primary | ICD-10-CM

## 2021-04-12 NOTE — PROGRESS NOTES
Saad Krishna is a 63 y.o. male that is here for a blood pressure check. His current medications are listed below.              His BP /77   PT WILL CONTINUE CURRENT MEDS

## 2021-04-14 ENCOUNTER — OFFICE VISIT (OUTPATIENT)
Dept: NEUROLOGY | Age: 61
End: 2021-04-14
Payer: MEDICARE

## 2021-04-14 VITALS
RESPIRATION RATE: 18 BRPM | HEART RATE: 101 BPM | HEIGHT: 69 IN | BODY MASS INDEX: 29.77 KG/M2 | WEIGHT: 201 LBS | SYSTOLIC BLOOD PRESSURE: 100 MMHG | DIASTOLIC BLOOD PRESSURE: 66 MMHG

## 2021-04-14 DIAGNOSIS — R55 SYNCOPE, UNSPECIFIED SYNCOPE TYPE: ICD-10-CM

## 2021-04-14 DIAGNOSIS — G47.33 OSA (OBSTRUCTIVE SLEEP APNEA): ICD-10-CM

## 2021-04-14 DIAGNOSIS — G47.52 RBD (REM BEHAVIORAL DISORDER): Primary | ICD-10-CM

## 2021-04-14 PROCEDURE — G8427 DOCREV CUR MEDS BY ELIG CLIN: HCPCS | Performed by: STUDENT IN AN ORGANIZED HEALTH CARE EDUCATION/TRAINING PROGRAM

## 2021-04-14 PROCEDURE — G0463 HOSPITAL OUTPT CLINIC VISIT: HCPCS | Performed by: STUDENT IN AN ORGANIZED HEALTH CARE EDUCATION/TRAINING PROGRAM

## 2021-04-14 PROCEDURE — G8432 DEP SCR NOT DOC, RNG: HCPCS | Performed by: STUDENT IN AN ORGANIZED HEALTH CARE EDUCATION/TRAINING PROGRAM

## 2021-04-14 PROCEDURE — 3017F COLORECTAL CA SCREEN DOC REV: CPT | Performed by: STUDENT IN AN ORGANIZED HEALTH CARE EDUCATION/TRAINING PROGRAM

## 2021-04-14 PROCEDURE — G8754 DIAS BP LESS 90: HCPCS | Performed by: STUDENT IN AN ORGANIZED HEALTH CARE EDUCATION/TRAINING PROGRAM

## 2021-04-14 PROCEDURE — 99214 OFFICE O/P EST MOD 30 MIN: CPT | Performed by: STUDENT IN AN ORGANIZED HEALTH CARE EDUCATION/TRAINING PROGRAM

## 2021-04-14 PROCEDURE — G8417 CALC BMI ABV UP PARAM F/U: HCPCS | Performed by: STUDENT IN AN ORGANIZED HEALTH CARE EDUCATION/TRAINING PROGRAM

## 2021-04-14 PROCEDURE — G8752 SYS BP LESS 140: HCPCS | Performed by: STUDENT IN AN ORGANIZED HEALTH CARE EDUCATION/TRAINING PROGRAM

## 2021-04-14 NOTE — PROGRESS NOTES
Aniyah Fallon is a 63 y.o. male . presents for Tremors (follow up)      A 63years old male patient here for follow-up evaluation of passing out spells. He came today with his wife. He was last in the clinic in June 2020. EEG to the normal.  Also get MRI of the brain on March 26, 2020 after presenting for a similar episode to [facility] emergency room. MRI is unremarkable. He continues to have the passing out spells. Of variable frequency. The same description: Most of the time after a cough, initially successful shake over the upper extremities, then becomes unresponsive for a few seconds. No postictal confusion. No tongue bite or incontinence. No Rao's paralysis. Wife mentioned few episodes of passing out spells without coughing. In one episode, it was getting out of his car when his legs give out and fell down with loss of consciousness. He is following with pulmonary for his COPD. Echocardiography previously showed reduced ejection fraction. The last one was done during his admission to [facility] in March 2021 which showed an ejection fraction of 44 was dilated right ventricle. Patient has a loop recorder. So far no detected or reported atrial fibrillation or other irregular heart rhythms. He has a sleep apnea and using CPAP. He also has chronic respiratory failure and supplemental oxygen at home. Wife mentions that he sometimes moves a lot in his sleep, talks, sometimes sits up on his bed. He might also open a drawer open a drawer and appears like a looking for something. He mentioned that he has bad dreams during the episodes. Does not have any tremor of the extremities other than before the passing out spells. Wife mentioned that he might have brief episodes of hallucinations during the passing out spells.        From previous encounter:  A 64years old male patient with medical history of COPD, diabetes, hypertension, hyperlipidemia, sleep apnea and pulmonary hypertension here for evaluation of multiple passing out spells of about 1 year duration. Frequency is variable. He claims that his passing out spells are mostly after intense cough. His wife mentions that after repeated coughing, he starts to shake while awake, then he passes out for a few seconds (about 5 seconds). At that time he is unresponsive. Does not have any postictal confusion. No tongue bite or foaming from his nose. No incontinence to bowel or bladder. He had one episode of fall but no head trauma. No postictal weakness of his extremities. Last episode was yesterday. Patient denied feeling dizzy/lightheaded or spinning sensation before the passing out spell. Denied any chest pain or palpitation. No sweating. Currently claims that his coughing is better. He has a history of COPD and obstructive sleep apnea. At one point, during the pulmonary function test, he has passed out. Following with pulmonary. Had a CT scan of the head from May 2020 which was unremarkable      Review of Systems   Constitutional: Negative for chills, fever and weight loss. HENT: Negative for hearing loss and tinnitus. Eyes: Positive for blurred vision. Negative for double vision. Respiratory: Positive for cough, sputum production and shortness of breath. Negative for hemoptysis. Cardiovascular: Positive for chest pain and leg swelling. Gastrointestinal: Positive for nausea (sometimes). Negative for vomiting. Genitourinary: Positive for frequency. Negative for dysuria and urgency. Musculoskeletal: Positive for back pain, falls (passing out spells) and neck pain. Skin: Negative for itching and rash. Neurological: Positive for dizziness (sometimes), speech change (occasionally) and weakness. Negative for tremors and headaches. Psychiatric/Behavioral: Positive for memory loss. Negative for depression. The patient has insomnia. The patient is not nervous/anxious.         Past Medical History:   Diagnosis Date    Back pain     Cardiomyopathy ([facility] [address]     LVEF 45% (2019)    Chronic obstructive pulmonary disease ([facility] [place] [address]     Diabetes ([facility]     History of loop recorder 08/2020    Hypercholesterolemia     Hypertension     Pulmonary hypertension (HCC)     Sleep apnea     Does not use CPAP    Tobacco abuse        Past Surgical History:   Procedure Laterality Date    COLONOSCOPY N/A 1/23/2017    COLONOSCOPY performed by Quoc Fung MD at [place] [address]  2003    umbilacal     HX SHOULDER ARTHROSCOPY  2004    bilateral    NH INSERTION [address] N/A 8/10/2020    LOOP RECORDER INSERT performed by Sharan Lorenzana MD at [facility]        Family History   Problem Relation Age of Onset   Collette Satchel Cancer Mother     Hypertension Mother     Heart Disease Mother     Diabetes Mother     No Known Problems Father         Social History     Socioeconomic History    Marital status:      Spouse name: Not on file    Number of children: Not on file    Years of education: Not on file    Highest education level: Not on file   Occupational History    Not on file   Social Needs    Financial resource strain: Not on file    Food insecurity     Worry: Not on file     Inability: Not on file    Transportation needs     Medical: Not on file     Non-medical: Not on file   Tobacco Use    Smoking status: Current Every Day Smoker     Packs/day: 0.50     Years: 35.00     Pack years: 17.50     Types: Cigarettes     Start date: 4/9/1986    Smokeless tobacco: Former User     Quit date: 3/16/2016    Tobacco comment: in process of quitting    Substance and Sexual Activity    Alcohol use:  Yes     Alcohol/week: 4.0 standard drinks     Types: 4 Shots of liquor per week    Drug use: No    Sexual activity: Yes     Partners: Female   Lifestyle    Physical activity     Days per week: Not on file     Minutes per session: Not on file    Stress: Not on file   Relationships    Social connections Talks on phone: Not on file     Gets together: Not on file     Attends [employer] service: Not on file     Active member of club or organization: Not on file     Attends meetings of clubs or organizations: Not on file     Relationship status: Not on file    Intimate partner violence     Fear of current or ex partner: Not on file     Emotionally abused: Not on file     Physically abused: Not on file     Forced sexual activity: Not on file   Other Topics Concern    Not on file   Social History Narrative    Not on file        Allergies   Allergen Reactions    Lisinopril Swelling         Current Outpatient Medications   Medication Sig Dispense Refill    amLODIPine (NORVASC) 5 mg tablet Take 1 Tab by mouth daily. 90 Tab 3    furosemide (Lasix) 40 mg tablet Take 1 Tab by mouth daily. 90 Tab 0    butenafine (MENTAX) 1 % topical cream Apply  to affected area daily. 30 g 0    metFORMIN (GLUCOPHAGE) 500 mg tablet TAKE 1 TABLET BY MOUTH TWICE DAILY WITH MEALS 180 Tab 1    hydroCHLOROthiazide (HYDRODIURIL) 25 mg tablet Take 1 tablet by mouth once daily 90 Tab 1    carvediloL (COREG) 6.25 mg tablet TAKE 1 TABLET BY MOUTH TWICE DAILY WITH MEALS 180 Tab 2    cetirizine (Allergy Relief, cetirizine,) 10 mg tablet Take 1 tablet by mouth once daily 90 Tab 2    pravastatin (PRAVACHOL) 40 mg tablet Take 40 mg by mouth nightly.  cyclobenzaprine (FLEXERIL) 10 mg tablet Take 1 Tab by mouth three (3) times daily as needed for Muscle Spasm(s). 90 Tab 0    clobetasoL (TEMOVATE) 0.05 % ointment Apply  to affected area two (2) times a day. Use a thin film. 60 g 0    amitriptyline (ELAVIL) 50 mg tablet Take 1 Tab by mouth nightly.  90 Tab 1    Advair Diskus 250-50 mcg/dose diskus inhaler       Spiriva with HandiHaler 18 mcg inhalation capsule       atorvastatin (LIPITOR) 40 mg tablet Take 1 tablet by mouth once daily 90 Tab 2    albuterol (PROVENTIL HFA, VENTOLIN HFA, PROAIR HFA) 90 mcg/actuation inhaler Take 2 Puffs by inhalation every four (4) hours as needed for Wheezing or Shortness of Breath. 1 Inhaler 3    aspirin 81 mg chewable tablet Take 1 Tab by mouth daily. 90 Tab 1    losartan (COZAAR) 50 mg tablet Take 1 Tab by mouth daily. (Patient taking differently: Take 100 mg by mouth daily.) 30 Tab 3    gabapentin (NEURONTIN) 300 mg capsule Take 1 Cap by mouth four (4) times daily. (Patient taking differently: Take 300 mg by mouth two (2) times a day.) 120 Cap 3    cholecalciferol (VITAMIN D3) 1,000 unit tablet Take 1,000 Units by mouth daily.  glipiZIDE (GLUCOTROL) 10 mg tablet Take 10 mg by mouth three (3) times daily. Physical Exam  Constitutional:       Appearance: Normal appearance. HENT:      Head: Normocephalic and atraumatic. Mouth/Throat:      Mouth: Mucous membranes are moist.      Pharynx: Oropharynx is clear. No oropharyngeal exudate. Eyes:      Extraocular Movements: Extraocular movements intact. Neck:      Musculoskeletal: Normal range of motion and neck supple. Pulmonary:      Effort: Pulmonary effort is normal. No respiratory distress. Musculoskeletal: Normal range of motion. Right lower leg: No edema. Left lower leg: No edema. Neurological:      Mental Status: He is alert. Comments: Mental status: Awake, alert, oriented x3, follows simple and complex commands, no neglect, no extinction to DSS or VSS. Speech and languge: fluent, coherent, and comprehension intact  CN: VFF, EOMI, PERRLA, face sensation intact , no facial asymmetry noted, palate elevation symmetric bilat, SS+SCM 5/5 bilat, tongue midline  Motor: no pronator drift, tone normal throughout, strength 5/5 throughout  Sensory: intact to light touch and PP  throughout  Coordination: FNF, HS accurate w/o dysmetria  DTR: 2+ throughout  Gait: normal; negative Romberg            No visits with results within 3 Month(s) from this visit.    Latest known visit with results is:   Hospital Outpatient Visit on 09/09/2020   Component Date Value Ref Range Status    Hemoglobin A1c 09/09/2020 9.3* 4.2 - 5.6 % Final    Comment: (NOTE)  HbA1C Interpretive Ranges  <5.7              Normal  5.7 - 6.4         Consider Prediabetes  >6.5              Consider Diabetes      Est. average glucose 09/09/2020 220  mg/dL Final    Comment: (NOTE)  The eAG should be interpreted with patient characteristics in mind   since ethnicity, interindividual differences, red cell lifespan,   variation in rates of glycation, etc. may affect the validity of the   calculation.  Sodium 09/09/2020 137  136 - 145 mmol/L Final    Potassium 09/09/2020 5.5  3.5 - 5.5 mmol/L Final    Chloride 09/09/2020 103  100 - 111 mmol/L Final    CO2 09/09/2020 30  21 - 32 mmol/L Final    Anion gap 09/09/2020 4  3.0 - 18 mmol/L Final    Glucose 09/09/2020 207* 74 - 99 mg/dL Final    BUN 09/09/2020 33* 7.0 - 18 MG/DL Final    Creatinine 09/09/2020 1.53* 0.6 - 1.3 MG/DL Final    BUN/Creatinine ratio 09/09/2020 22* 12 - 20   Final    GFR est AA 09/09/2020 57* >60 ml/min/1.73m2 Final    GFR est non-AA 09/09/2020 47* >60 ml/min/1.73m2 Final    Comment: (NOTE)  Estimated GFR is calculated using the Modification of Diet in Renal   Disease (MDRD) Study equation, reported for both  Americans   (GFRAA) and non- Americans (GFRNA), and normalized to 1.73m2   body surface area. The physician must decide which value applies to   the patient. The MDRD study equation should only be used in   individuals age 25 or older. It has not been validated for the   following: pregnant women, patients with serious comorbid conditions,   or on certain medications, or persons with extremes of body size,   muscle mass, or nutritional status.  Calcium 09/09/2020 8.8  8.5 - 10.1 MG/DL Final             ICD-10-CM ICD-9-CM    1. RBD (REM behavioral disorder)  G47.52 327.42 SLEEP MEDICINE REFERRAL   2.  JAIMEE (obstructive sleep apnea)  G47.33 327.23 SLEEP MEDICINE REFERRAL 3. Syncope, unspecified syncope type  R55 780.2        A 63years old male patient here for follow-up evaluation of multiple passing out spells. Mostly with straining especially when he is coughing heavy. Few episodes of passing out spells without straining. Episodes are for short duration and preceded by upper extremity tremulous movement. Most likely cough syncope from his COPD. Contributing factors include CHF and also drug abuse. He has problems during sleep where he thrashes around, has but dreams, and might act out his own dreams. Possible REM sleep behavior disorder. Wife as mentioned episodes of hallucinations during passing out spells. The picture is atypical for narcolepsy with cataplexy. We will send him for a sleep study. He has 2 previous EEGs which are normal.  MRI of the brain recently is unremarkable. Following with cardiology and pulmonary medicine. We will see him in 6 months time.

## 2021-04-22 ENCOUNTER — HOSPITAL ENCOUNTER (OUTPATIENT)
Dept: LAB | Age: 61
Discharge: HOME | End: 2021-04-22
Payer: MEDICARE

## 2021-04-22 ENCOUNTER — OFFICE VISIT (OUTPATIENT)
Dept: FAMILY MEDICINE CLINIC | Age: 61
End: 2021-04-22
Payer: MEDICARE

## 2021-04-22 VITALS
WEIGHT: 215.6 LBS | RESPIRATION RATE: 20 BRPM | DIASTOLIC BLOOD PRESSURE: 80 MMHG | BODY MASS INDEX: 31.93 KG/M2 | OXYGEN SATURATION: 93 % | TEMPERATURE: 98.7 F | SYSTOLIC BLOOD PRESSURE: 119 MMHG | HEART RATE: 100 BPM | HEIGHT: 69 IN

## 2021-04-22 DIAGNOSIS — K59.00 CONSTIPATION, UNSPECIFIED CONSTIPATION TYPE: ICD-10-CM

## 2021-04-22 DIAGNOSIS — I10 ESSENTIAL HYPERTENSION: ICD-10-CM

## 2021-04-22 DIAGNOSIS — E11.9 COMPREHENSIVE DIABETIC FOOT EXAMINATION, TYPE 2 DM, ENCOUNTER FOR (HCC): ICD-10-CM

## 2021-04-22 DIAGNOSIS — E11.40 TYPE 2 DIABETES MELLITUS WITH DIABETIC NEUROPATHY, WITHOUT LONG-TERM CURRENT USE OF INSULIN (HCC): ICD-10-CM

## 2021-04-22 DIAGNOSIS — R55 SYNCOPE AND COLLAPSE: Primary | ICD-10-CM

## 2021-04-22 DIAGNOSIS — E78.00 HYPERCHOLESTEREMIA: ICD-10-CM

## 2021-04-22 LAB
CHOLEST SERPL-MCNC: 113 MG/DL
CREAT UR-MCNC: 19 MG/DL (ref 30–125)
EST. AVERAGE GLUCOSE BLD GHB EST-MCNC: 209 MG/DL
HBA1C MFR BLD: 8.9 % (ref 4.2–5.6)
HDLC SERPL-MCNC: 37 MG/DL (ref 40–60)
HDLC SERPL: 3.1 {RATIO} (ref 0–5)
LDLC SERPL CALC-MCNC: 54.6 MG/DL (ref 0–100)
LIPID PROFILE,FLP: ABNORMAL
MICROALBUMIN UR-MCNC: 50.6 MG/DL (ref 0–3)
MICROALBUMIN/CREAT UR-RTO: 2663 MG/G (ref 0–30)
TRIGL SERPL-MCNC: 107 MG/DL (ref ?–150)
VLDLC SERPL CALC-MCNC: 21.4 MG/DL

## 2021-04-22 PROCEDURE — 80061 LIPID PANEL: CPT

## 2021-04-22 PROCEDURE — 3046F HEMOGLOBIN A1C LEVEL >9.0%: CPT | Performed by: NURSE PRACTITIONER

## 2021-04-22 PROCEDURE — G8752 SYS BP LESS 140: HCPCS | Performed by: NURSE PRACTITIONER

## 2021-04-22 PROCEDURE — G8754 DIAS BP LESS 90: HCPCS | Performed by: NURSE PRACTITIONER

## 2021-04-22 PROCEDURE — 36415 COLL VENOUS BLD VENIPUNCTURE: CPT

## 2021-04-22 PROCEDURE — G8417 CALC BMI ABV UP PARAM F/U: HCPCS | Performed by: NURSE PRACTITIONER

## 2021-04-22 PROCEDURE — 99214 OFFICE O/P EST MOD 30 MIN: CPT | Performed by: NURSE PRACTITIONER

## 2021-04-22 PROCEDURE — 2022F DILAT RTA XM EVC RTNOPTHY: CPT | Performed by: NURSE PRACTITIONER

## 2021-04-22 PROCEDURE — 3017F COLORECTAL CA SCREEN DOC REV: CPT | Performed by: NURSE PRACTITIONER

## 2021-04-22 PROCEDURE — 82043 UR ALBUMIN QUANTITATIVE: CPT

## 2021-04-22 PROCEDURE — G8427 DOCREV CUR MEDS BY ELIG CLIN: HCPCS | Performed by: NURSE PRACTITIONER

## 2021-04-22 PROCEDURE — 83036 HEMOGLOBIN GLYCOSYLATED A1C: CPT

## 2021-04-22 PROCEDURE — G8432 DEP SCR NOT DOC, RNG: HCPCS | Performed by: NURSE PRACTITIONER

## 2021-04-22 RX ORDER — HYDROCHLOROTHIAZIDE 25 MG/1
TABLET ORAL
Qty: 90 TAB | Refills: 1 | Status: SHIPPED | OUTPATIENT
Start: 2021-04-22

## 2021-04-22 RX ORDER — POLYETHYLENE GLYCOL 3350 17 G/17G
17 POWDER, FOR SOLUTION ORAL DAILY
Qty: 14 PACKET | Refills: 1 | Status: SHIPPED | OUTPATIENT
Start: 2021-04-22

## 2021-04-22 NOTE — PATIENT INSTRUCTIONS
Low Back Pain: Exercises Introduction Here are some examples of exercises for you to try. The exercises may be suggested for a condition or for rehabilitation. Start each exercise slowly. Ease off the exercises if you start to have pain. You will be told when to start these exercises and which ones will work best for you. How to do the exercises Press-up 1. Lie on your stomach, supporting your body with your forearms. 2. Press your elbows down into the floor to raise your upper back. As you do this, relax your stomach muscles and allow your back to arch without using your back muscles. As your press up, do not let your hips or pelvis come off the floor. 3. Hold for 15 to 30 seconds, then relax. 4. Repeat 2 to 4 times. Alternate arm and leg (bird dog) exercise Do this exercise slowly. Try to keep your body straight at all times, and do not let one hip drop lower than the other. 1. Start on the floor, on your hands and knees. 2. Tighten your belly muscles. 3. Raise one leg off the floor, and hold it straight out behind you. Be careful not to let your hip drop down, because that will twist your trunk. 4. Hold for about 6 seconds, then lower your leg and switch to the other leg. 5. Repeat 8 to 12 times on each leg. 6. Over time, work up to holding for 10 to 30 seconds each time. 7. If you feel stable and secure with your leg raised, try raising the opposite arm straight out in front of you at the same time. Knee-to-chest exercise 1. Lie on your back with your knees bent and your feet flat on the floor. 2. Bring one knee to your chest, keeping the other foot flat on the floor (or keeping the other leg straight, whichever feels better on your lower back). 3. Keep your lower back pressed to the floor. Hold for at least 15 to 30 seconds. 4. Relax, and lower the knee to the starting position. 5. Repeat with the other leg. Repeat 2 to 4 times with each leg.  
6. To get more stretch, put your other leg flat on the floor while pulling your knee to your chest.   
Curl-ups 1. Lie on the floor on your back with your knees bent at a 90-degree angle. Your feet should be flat on the floor, about 12 inches from your buttocks. 2. Cross your arms over your chest. If this bothers your neck, try putting your hands behind your neck (not your head), with your elbows spread apart. 3. Slowly tighten your belly muscles and raise your shoulder blades off the floor. 4. Keep your head in line with your body, and do not press your chin to your chest. 
5. Hold this position for 1 or 2 seconds, then slowly lower yourself back down to the floor. 6. Repeat 8 to 12 times. Pelvic tilt exercise 1. Lie on your back with your knees bent. 2. \"Brace\" your stomach. This means to tighten your muscles by pulling in and imagining your belly button moving toward your spine. You should feel like your back is pressing to the floor and your hips and pelvis are rocking back. 3. Hold for about 6 seconds while you breathe smoothly. 4. Repeat 8 to 12 times. Heel dig bridging 1. Lie on your back with both knees bent and your ankles bent so that only your heels are digging into the floor. Your knees should be bent about 90 degrees. 2. Then push your heels into the floor, squeeze your buttocks, and lift your hips off the floor until your shoulders, hips, and knees are all in a straight line. 3. Hold for about 6 seconds as you continue to breathe normally, and then slowly lower your hips back down to the floor and rest for up to 10 seconds. 4. Do 8 to 12 repetitions. Hamstring stretch in doorway 1. Lie on your back in a doorway, with one leg through the open door. 2. Slide your leg up the wall to straighten your knee. You should feel a gentle stretch down the back of your leg. 3. Hold the stretch for at least 15 to 30 seconds. Do not arch your back, point your toes, or bend either knee.  Keep one heel touching the floor and the other heel touching the wall. 4. Repeat with your other leg. 5. Do 2 to 4 times for each leg. Hip flexor stretch 1. Kneel on the floor with one knee bent and one leg behind you. Place your forward knee over your foot. Keep your other knee touching the floor. 2. Slowly push your hips forward until you feel a stretch in the upper thigh of your rear leg. 3. Hold the stretch for at least 15 to 30 seconds. Repeat with your other leg. 4. Do 2 to 4 times on each side. Wall sit 1. Stand with your back 10 to 12 inches away from a wall. 2. Lean into the wall until your back is flat against it. 3. Slowly slide down until your knees are slightly bent, pressing your lower back into the wall. 4. Hold for about 6 seconds, then slide back up the wall. 5. Repeat 8 to 12 times. Follow-up care is a key part of your treatment and safety. Be sure to make and go to all appointments, and call your doctor if you are having problems. It's also a good idea to know your test results and keep a list of the medicines you take. Where can you learn more? Go to http://laurita-venu.info/ Enter A478 in the search box to learn more about \"Low Back Pain: Exercises. \" Current as of: November 16, 2020               Content Version: 12.8 © 2006-2021 Healthwise, Incorporated. Care instructions adapted under license by [employer] (which disclaims liability or warranty for this information). If you have questions about a medical condition or this instruction, always ask your healthcare professional. [place] [address] any warranty or liability for your use of this information. Acute Low Back Pain: Exercises Introduction Here are some examples of typical rehabilitation exercises for your condition. Start each exercise slowly. Ease off the exercise if you start to have pain.  
Your doctor or physical therapist will tell you when you can start these exercises and which ones will work best for you. When you are not being active, find a comfortable position for rest. Some people are comfortable on the floor or a medium-firm bed with a small pillow under their head and another under their knees. Some people prefer to lie on their side with a pillow between their knees. Don't stay in one position for too long. Take short walks (10 to 20 minutes) every 2 to 3 hours. Avoid slopes, hills, and stairs until you feel better. Walk only distances you can manage without pain, especially leg pain. How to do the exercises Back stretches 1. Get down on your hands and knees on the floor. 2. Relax your head and allow it to droop. Round your back up toward the ceiling until you feel a nice stretch in your upper, middle, and lower back. Hold this stretch for as long as it feels comfortable, or about 15 to 30 seconds. 3. Return to the starting position with a flat back while you are on your hands and knees. 4. Let your back sway by pressing your stomach toward the floor. Lift your buttocks toward the ceiling. 5. Hold this position for 15 to 30 seconds. 6. Repeat 2 to 4 times. Follow-up care is a key part of your treatment and safety. Be sure to make and go to all appointments, and call your doctor if you are having problems. It's also a good idea to know your test results and keep a list of the medicines you take. Where can you learn more? Go to http://laurita-venu.info/ Enter F568 in the search box to learn more about \"Acute Low Back Pain: Exercises. \" Current as of: November 16, 2020               Content Version: 12.8 © 2006-2021 Healthwise, Incorporated. Care instructions adapted under license by [employer] (which disclaims liability or warranty for this information).  If you have questions about a medical condition or this instruction, always ask your healthcare professional. [address] any warranty or liability for your use of this information. Sciatica: Exercises Introduction Here are some examples of typical rehabilitation exercises for your condition. Start each exercise slowly. Ease off the exercise if you start to have pain. Your doctor or physical therapist will tell you when you can start these exercises and which ones will work best for you. When you are not being active, find a comfortable position for rest. Some people are comfortable on the floor or a medium-firm bed with a small pillow under their head and another under their knees. Some people prefer to lie on their side with a pillow between their knees. Don't stay in one position for too long. Take short walks (10 to 20 minutes) every 2 to 3 hours. Avoid slopes, hills, and stairs until you feel better. Walk only distances you can manage without pain, especially leg pain. How to do the exercises Back stretches 1. Get down on your hands and knees on the floor. 2. Relax your head and allow it to droop. Round your back up toward the ceiling until you feel a nice stretch in your upper, middle, and lower back. Hold this stretch for as long as it feels comfortable, or about 15 to 30 seconds. 3. Return to the starting position with a flat back while you are on your hands and knees. 4. Let your back sway by pressing your stomach toward the floor. Lift your buttocks toward the ceiling. 5. Hold this position for 15 to 30 seconds. 6. Repeat 2 to 4 times. Follow-up care is a key part of your treatment and safety. Be sure to make and go to all appointments, and call your doctor if you are having problems. It's also a good idea to know your test results and keep a list of the medicines you take. Where can you learn more? Go to http://www.gray.com/ Enter J012 in the search box to learn more about \"Sciatica: Exercises. \" Current as of: November 16, 2020               Content Version: 12.8 © 5325-4200 Healthwise, [employer]. Care instructions adapted under license by [employer] (which disclaims liability or warranty for this information). If you have questions about a medical condition or this instruction, always ask your healthcare professional. [address] any warranty or liability for your use of this information. Learning About Relief for Back Pain What is back strain? Back strain is an injury that happens when you overstretch, or pull, a muscle in your back. You may hurt your back in an accident or when you exercise or lift something. Most back pain gets better with rest and time. You can take care of yourself at home to help your back heal. 
What can you do first to relieve back pain? When you first feel back pain, try these steps: 
· Walk. Take a short walk (10 to 20 minutes) on a level surface (no slopes, hills, or stairs) every 2 to 3 hours. Walk only distances you can manage without pain, especially leg pain. · Relax. Find a comfortable position for rest. Some people are comfortable on the floor or a medium-firm bed with a small pillow under their head and another under their knees. Some people prefer to lie on their side with a pillow between their knees. Don't stay in one position for too long. · Try heat or ice. Try using a heating pad on a low or medium setting, or take a warm shower, for 15 to 20 minutes every 2 to 3 hours. Or you can buy single-use heat wraps that last up to 8 hours. You can also try an ice pack for 10 to 15 minutes every 2 to 3 hours. You can use an ice pack or a bag of frozen vegetables wrapped in a thin towel. There is not strong evidence that either heat or ice will help, but you can try them to see if they help. You may also want to try switching between heat and cold. · Take pain medicine exactly as directed. ? If the doctor gave you a prescription medicine for pain, take it as prescribed.  
? If you are not taking a prescription pain medicine, ask your doctor if you can take an over-the-counter medicine. What else can you do? · Stretch and exercise. Exercises that increase flexibility may relieve your pain and make it easier for your muscles to keep your spine in a good, neutral position. And don't forget to keep walking. · Do self-massage. You can use self-massage to unwind after work or school or to energize yourself in the morning. You can easily massage your feet, hands, or neck. Self-massage works best if you are in comfortable clothes and are sitting or lying in a comfortable position. Use oil or lotion to massage bare skin. · Reduce stress. Back pain can lead to a vicious [place]: Distress about the pain tenses the muscles in your back, which in turn causes more pain. Learn how to relax your mind and your muscles to lower your stress. Where can you learn more? Go to http://www.gray.com/ Enter F295 in the search box to learn more about \"Learning About Relief for Back Pain. \" Current as of: November 16, 2020               Content Version: 12.8 © 2006-2021 [employer]. Care instructions adapted under license by [employer] (which disclaims liability or warranty for this information). If you have questions about a medical condition or this instruction, always ask your healthcare professional. [address] any warranty or liability for your use of this information. Learning About Relief for Back Pain What is back strain? Back strain is an injury that happens when you overstretch, or pull, a muscle in your back. You may hurt your back in an accident or when you exercise or lift something. Most back pain gets better with rest and time. You can take care of yourself at home to help your back heal. 
What can you do first to relieve back pain? When you first feel back pain, try these steps: 
· Walk.  Take a short walk (10 to 20 minutes) on a level surface (no slopes, hills, or stairs) every 2 to 3 hours. Walk only distances you can manage without pain, especially leg pain. · Relax. Find a comfortable position for rest. Some people are comfortable on the floor or a medium-firm bed with a small pillow under their head and another under their knees. Some people prefer to lie on their side with a pillow between their knees. Don't stay in one position for too long. · Try heat or ice. Try using a heating pad on a low or medium setting, or take a warm shower, for 15 to 20 minutes every 2 to 3 hours. Or you can buy single-use heat wraps that last up to 8 hours. You can also try an ice pack for 10 to 15 minutes every 2 to 3 hours. You can use an ice pack or a bag of frozen vegetables wrapped in a thin towel. There is not strong evidence that either heat or ice will help, but you can try them to see if they help. You may also want to try switching between heat and cold. · Take pain medicine exactly as directed. ? If the doctor gave you a prescription medicine for pain, take it as prescribed. ? If you are not taking a prescription pain medicine, ask your doctor if you can take an over-the-counter medicine. What else can you do? · Stretch and exercise. Exercises that increase flexibility may relieve your pain and make it easier for your muscles to keep your spine in a good, neutral position. And don't forget to keep walking. · Do self-massage. You can use self-massage to unwind after work or school or to energize yourself in the morning. You can easily massage your feet, hands, or neck. Self-massage works best if you are in comfortable clothes and are sitting or lying in a comfortable position. Use oil or lotion to massage bare skin. · Reduce stress. Back pain can lead to a vicious [place]: Distress about the pain tenses the muscles in your back, which in turn causes more pain. Learn how to relax your mind and your muscles to lower your stress. Where can you learn more?  
Go to http://www.gray.com/ Enter X375 in the search box to learn more about \"Learning About Relief for Back Pain. \" Current as of: November 16, 2020               Content Version: 12.8 © 2006-2021 [employer]. Care instructions adapted under license by [employer] (which disclaims liability or warranty for this information). If you have questions about a medical condition or this instruction, always ask your healthcare professional. [address] any warranty or liability for your use of this information. Learning About Relief for Back Pain What is back strain? Back strain is an injury that happens when you overstretch, or pull, a muscle in your back. You may hurt your back in an accident or when you exercise or lift something. Most back pain gets better with rest and time. You can take care of yourself at home to help your back heal. 
What can you do first to relieve back pain? When you first feel back pain, try these steps: 
· Walk. Take a short walk (10 to 20 minutes) on a level surface (no slopes, hills, or stairs) every 2 to 3 hours. Walk only distances you can manage without pain, especially leg pain. · Relax. Find a comfortable position for rest. Some people are comfortable on the floor or a medium-firm bed with a small pillow under their head and another under their knees. Some people prefer to lie on their side with a pillow between their knees. Don't stay in one position for too long. · Try heat or ice. Try using a heating pad on a low or medium setting, or take a warm shower, for 15 to 20 minutes every 2 to 3 hours. Or you can buy single-use heat wraps that last up to 8 hours. You can also try an ice pack for 10 to 15 minutes every 2 to 3 hours. You can use an ice pack or a bag of frozen vegetables wrapped in a thin towel.  There is not strong evidence that either heat or ice will help, but you can try them to see if they help. You may also want to try switching between heat and cold. · Take pain medicine exactly as directed. ? If the doctor gave you a prescription medicine for pain, take it as prescribed. ? If you are not taking a prescription pain medicine, ask your doctor if you can take an over-the-counter medicine. What else can you do? · Stretch and exercise. Exercises that increase flexibility may relieve your pain and make it easier for your muscles to keep your spine in a good, neutral position. And don't forget to keep walking. · Do self-massage. You can use self-massage to unwind after work or school or to energize yourself in the morning. You can easily massage your feet, hands, or neck. Self-massage works best if you are in comfortable clothes and are sitting or lying in a comfortable position. Use oil or lotion to massage bare skin. · Reduce stress. Back pain can lead to a vicious [place]: Distress about the pain tenses the muscles in your back, which in turn causes more pain. Learn how to relax your mind and your muscles to lower your stress. Where can you learn more? Go to http://www.gray.com/ Enter P044 in the search box to learn more about \"Learning About Relief for Back Pain. \" Current as of: November 16, 2020               Content Version: 12.8 © 2006-2021 [employer]. Care instructions adapted under license by [employer] (which disclaims liability or warranty for this information). If you have questions about a medical condition or this instruction, always ask your healthcare professional. [place] [address] any warranty or liability for your use of this information. Learning About Relief for Back Pain What is back strain? Back strain is an injury that happens when you overstretch, or pull, a muscle in your back. You may hurt your back in an accident or when you exercise or lift something.  Most back pain gets better with rest and time. You can take care of yourself at home to help your back heal. 
What can you do first to relieve back pain? When you first feel back pain, try these steps: 
· Walk. Take a short walk (10 to 20 minutes) on a level surface (no slopes, hills, or stairs) every 2 to 3 hours. Walk only distances you can manage without pain, especially leg pain. · Relax. Find a comfortable position for rest. Some people are comfortable on the floor or a medium-firm bed with a small pillow under their head and another under their knees. Some people prefer to lie on their side with a pillow between their knees. Don't stay in one position for too long. · Try heat or ice. Try using a heating pad on a low or medium setting, or take a warm shower, for 15 to 20 minutes every 2 to 3 hours. Or you can buy single-use heat wraps that last up to 8 hours. You can also try an ice pack for 10 to 15 minutes every 2 to 3 hours. You can use an ice pack or a bag of frozen vegetables wrapped in a thin towel. There is not strong evidence that either heat or ice will help, but you can try them to see if they help. You may also want to try switching between heat and cold. · Take pain medicine exactly as directed. ? If the doctor gave you a prescription medicine for pain, take it as prescribed. ? If you are not taking a prescription pain medicine, ask your doctor if you can take an over-the-counter medicine. What else can you do? · Stretch and exercise. Exercises that increase flexibility may relieve your pain and make it easier for your muscles to keep your spine in a good, neutral position. And don't forget to keep walking. · Do self-massage. You can use self-massage to unwind after work or school or to energize yourself in the morning. You can easily massage your feet, hands, or neck. Self-massage works best if you are in comfortable clothes and are sitting or lying in a comfortable position. Use oil or lotion to massage bare skin.  
· Reduce stress. Back pain can lead to a vicious [place]: Distress about the pain tenses the muscles in your back, which in turn causes more pain. Learn how to relax your mind and your muscles to lower your stress. Where can you learn more? Go to http://www.gray.com/ Enter F679 in the search box to learn more about \"Learning About Relief for Back Pain. \" Current as of: November 16, 2020               Content Version: 12.8 © 2006-2021 Healthwise, [employer]. Care instructions adapted under license by [employer] (which disclaims liability or warranty for this information). If you have questions about a medical condition or this instruction, always ask your healthcare professional. [address] any warranty or liability for your use of this information.

## 2021-04-22 NOTE — PROGRESS NOTES
HISTORY OF PRESENT ILLNESS  Amber Schultz is a 63 y.o. male seen for hospital follow-up syncope and collapse  HPI  Dr. aMck Coburn patient. Per encounters, patient has been in the hospital recently, ED visit March 1, 2021 due to chronic hypoxemic respiratory failure and admitted March 22-27, 2021, syncope and collapse. Per Dr. Edmundo Chau note, patient had used cocaine recently. Patient indicated it was a one-time thing today and he is not using cocaine again. Patient says he smokes marijuana regularly. Appears patient has been seen by neurology and by Dr. Nancy Garcia with cardiology recently. Per patient he is wearing one of the heart holster and Dr. Nancy Garcia wants him to wear it for a year. He goes back next week because the battery may need to replaced. Patient has a history of copd, he is on 4L of oxygen and sees Dr. Logan Gloria. He says he is well today and he looks well. He met his first great grandchild from [place]. Patient c/o of bilateral low back pain, pain goes down his right leg sometimes but not today. He is not incontinent of urine or stool and denies saddle numbness. Patient reports in the past when he is done his back exercises he learned in physical therapy his back feels better. Advised patient that he could use heat and cold alternating and recommended he start his back exercises again. Patient requested \"strong pain meds. \"  I declined. Patient uses Flexeril as needed. I asked patient if he would like a referral to pain management, he declined. Patient says he has been to pain management before. Patient's vital is good today. Patient will get blood work done Dr. Marvin Melendrez ordered 3/15/2021, lipid panel, A1c and urine microalbumin. He says he doesn't eat like he used to, no issues urinating and has had some constipation. His wife worries about his appetite. We dicussed changes in appetite as we grow older and taste buds.   Patient denies fever, chills, chest pain, shortness of breath, abdomen pain or dark tarry stool. Review of Systems   Constitutional: Negative for chills, fever, malaise/fatigue and weight loss. HENT: Negative for congestion, ear pain, sinus pain and sore throat. Eyes: Negative. Respiratory: Negative for cough and shortness of breath. Cardiovascular: Positive for leg swelling. Negative for chest pain and palpitations. Gastrointestinal: Positive for constipation. Negative for abdominal pain, blood in stool, diarrhea, melena, nausea and vomiting. Genitourinary: Negative. Musculoskeletal: Positive for back pain. Negative for falls, joint pain, myalgias and neck pain. Neurological: Negative. Endo/Heme/Allergies: Negative. Psychiatric/Behavioral: Negative for depression. The patient is not nervous/anxious. Visit Vitals  /80 (BP 1 Location: Right upper arm, BP Patient Position: Sitting, BP Cuff Size: Large adult)   Pulse 100   Temp 98.7 °F (37.1 °C) (Temporal)   Resp 20   Ht 5' 9\" (1.753 m)   Wt 215 lb 9.6 oz (97.8 kg)   SpO2 93%   BMI 31.84 kg/m²     Physical Exam  Vitals signs and nursing note reviewed. Constitutional:       Appearance: Normal appearance. He is well-developed and well-groomed. He is obese. He is not ill-appearing. HENT:      Head: Normocephalic and atraumatic. Right Ear: External ear normal.      Left Ear: External ear normal.      Nose: Nose normal. No congestion. Mouth/Throat:      Mouth: Mucous membranes are moist.      Pharynx: Oropharynx is clear. No oropharyngeal exudate. Eyes:      General:         Right eye: No discharge. Left eye: No discharge. Conjunctiva/sclera: Conjunctivae normal.      Pupils: Pupils are equal, round, and reactive to light. Neck:      Musculoskeletal: Normal range of motion. No muscular tenderness. Cardiovascular:      Rate and Rhythm: Normal rate and regular rhythm. Pulses: Normal pulses. Heart sounds: Normal heart sounds. No murmur. No friction rub.  No gallop. Pulmonary:      Effort: Pulmonary effort is normal.      Breath sounds: Normal breath sounds. Abdominal:      General: Bowel sounds are normal.      Palpations: Abdomen is soft. Tenderness: There is no abdominal tenderness. Musculoskeletal: Normal range of motion. Right lower leg: Edema present. Left lower leg: Edema present. Skin:     General: Skin is warm and dry. Coloration: Skin is not jaundiced or pale. Neurological:      Mental Status: He is alert and oriented to person, place, and time. Psychiatric:         Mood and Affect: Mood normal.         Behavior: Behavior normal.         Thought Content:  Thought content normal.         Judgment: Judgment normal.          Diabetic foot exam performed by Governor SANDY Holly     Measurement  Response Nurse Comment Physician Comment   Monofilament  R - normal sensation with micro filament  L - reduced sensation with micro filament     Pulse DP R - present  L - present     Pulse TP R - present  L - present     Structural deformity R - None  L - None     Skin Integrity / Deformity R - Mild - dry  L - Mild - dry, cracking heel        Reviewed by:         Past Medical History:   Diagnosis Date    Back pain     Cardiomyopathy ([address]     LVEF 45% (2019)    Chronic obstructive pulmonary disease ([address]     Diabetes ([address]     History of loop recorder 08/2020    Hypercholesterolemia     Hypertension     Pulmonary hypertension ([address]     Sleep apnea     Does not use CPAP    Tobacco abuse      Patient Active Problem List   Diagnosis Code    Midline low back pain without sciatica M54.5    Essential hypertension I10    Hypercholesteremia E78.00    Hip pain M25.559    JAIMEE (obstructive sleep apnea) G47.33    Clubbing of nail R68.3    Chronic obstructive pulmonary disease (HCC) J44.9    Type 2 diabetes mellitus with diabetic neuropathy (HCC) E11.40    Other proteinuria R80.8    Cardiomyopathy ([address] I42.9    Spinal stenosis of lumbar region M48.061    Degeneration of intervertebral disc of lumbar region M51.36    Cigarette nicotine dependence without complication O24.182    Primary insomnia F51.01    Pulmonary nodule, left R91.1    Stage 3 chronic kidney disease (HCC) N18.30     Current Outpatient Medications on File Prior to Visit   Medication Sig Dispense Refill    amLODIPine (NORVASC) 5 mg tablet Take 1 Tab by mouth daily. 90 Tab 3    furosemide (Lasix) 40 mg tablet Take 1 Tab by mouth daily. 90 Tab 0    butenafine (MENTAX) 1 % topical cream Apply  to affected area daily. 30 g 0    metFORMIN (GLUCOPHAGE) 500 mg tablet TAKE 1 TABLET BY MOUTH TWICE DAILY WITH MEALS 180 Tab 1    [DISCONTINUED] hydroCHLOROthiazide (HYDRODIURIL) 25 mg tablet Take 1 tablet by mouth once daily 90 Tab 1    carvediloL (COREG) 6.25 mg tablet TAKE 1 TABLET BY MOUTH TWICE DAILY WITH MEALS 180 Tab 2    cetirizine (Allergy Relief, cetirizine,) 10 mg tablet Take 1 tablet by mouth once daily 90 Tab 2    pravastatin (PRAVACHOL) 40 mg tablet Take 40 mg by mouth nightly.  cyclobenzaprine (FLEXERIL) 10 mg tablet Take 1 Tab by mouth three (3) times daily as needed for Muscle Spasm(s). 90 Tab 0    clobetasoL (TEMOVATE) 0.05 % ointment Apply  to affected area two (2) times a day. Use a thin film. 60 g 0    amitriptyline (ELAVIL) 50 mg tablet Take 1 Tab by mouth nightly. 90 Tab 1    Advair Diskus 250-50 mcg/dose diskus inhaler       Spiriva with HandiHaler 18 mcg inhalation capsule       atorvastatin (LIPITOR) 40 mg tablet Take 1 tablet by mouth once daily 90 Tab 2    albuterol (PROVENTIL HFA, VENTOLIN HFA, PROAIR HFA) 90 mcg/actuation inhaler Take 2 Puffs by inhalation every four (4) hours as needed for Wheezing or Shortness of Breath. 1 Inhaler 3    aspirin 81 mg chewable tablet Take 1 Tab by mouth daily. 90 Tab 1    losartan (COZAAR) 50 mg tablet Take 1 Tab by mouth daily.  (Patient taking differently: Take 100 mg by mouth daily.) 30 Tab 3    gabapentin (NEURONTIN) 300 mg capsule Take 1 Cap by mouth four (4) times daily. (Patient taking differently: Take 300 mg by mouth two (2) times a day.) 120 Cap 3    cholecalciferol (VITAMIN D3) 1,000 unit tablet Take 1,000 Units by mouth daily.  glipiZIDE (GLUCOTROL) 10 mg tablet Take 10 mg by mouth three (3) times daily. No current facility-administered medications on file prior to visit. ASSESSMENT and PLAN    ICD-10-CM ICD-9-CM    1. Syncope and collapse  R55 780.2    2. Constipation, unspecified constipation type  K59.00 564.00 polyethylene glycol (MIRALAX) 17 gram packet   3. Essential hypertension  I10 401.9 hydroCHLOROthiazide (HYDRODIURIL) 25 mg tablet   4. Type 2 diabetes mellitus with diabetic neuropathy, without long-term current use of insulin ([facility])  E11.40 250.60  DIABETES FOOT EXAM     357.2    5. Comprehensive diabetic foot examination, type 2 DM, encounter for ([facility]  E11.9 250.00  DIABETES FOOT EXAM      REFERRAL TO PODIATRY     Follow-up and Dispositions    · Return in about 2 weeks (around 5/13/2021). 50% of 30 minutes spent on counseling and managing of care. Gave patient educational materials on back exercises. He says he has been to PT before and feels better when he does his back exercises.

## 2021-04-22 NOTE — PROGRESS NOTES
Room #  12    Chief Complaint:    Hospital follow up  HPI:    Olimpia Gamino is a 63 y.o. male who presents today for c/o Hospital follow up, on 4 L of oxygen    1. Have you been to the ER, urgent care clinic since your last visit? Hospitalized since your last visit? NO When:    2. Have you seen or consulted any other health care providers outside of the [address] [place] [address] since your last visit? Include any pap smears or colon screening. NO  When :  Reason:    Last  Checked na  Last UDS Checked na  Last Pain contract signed: na    Consent:  He and/or health care decision maker is aware that that he may receive a bill for this telephone service, depending on his insurance coverage, and has provided verbal consent to proceed: Yes      Health Maintenance reviewed Yes    Health Maintenance Due   Topic Date Due    Eye Exam Retinal or Dilated  Never done    COVID-19 Vaccine (1) Never done    Shingrix Vaccine Age 50> (2 of 2) 03/16/2020    Foot Exam Q1  06/25/2020    A1C test (Diabetic or Prediabetic)  12/09/2020    MICROALBUMIN Q1  01/20/2021    Lipid Screen  01/20/2021     Depression Screening:  3 most recent PHQ Screens 4/22/2021   Little interest or pleasure in doing things Not at all   Feeling down, depressed, irritable, or hopeless Not at all   Total Score PHQ 2 0     Learning Assessment:  Learning Assessment 7/12/2018   PRIMARY LEARNER Patient   HIGHEST LEVEL OF EDUCATION - PRIMARY LEARNER  GRADUATED HIGH SCHOOL OR GED   BARRIERS PRIMARY LEARNER NONE   CO-LEARNER CAREGIVER No   PRIMARY LANGUAGE ENGLISH   LEARNER PREFERENCE PRIMARY LISTENING     READING     DEMONSTRATION   ANSWERED BY patient   RELATIONSHIP SELF     Abuse Screening:  Abuse Screening Questionnaire 4/1/2021   Do you ever feel afraid of your partner? N   Are you in a relationship with someone who physically or mentally threatens you? N   Is it safe for you to go home?  Y     Fall Risk  Fall Risk Assessment, last 12 mths 4/1/2021 Able to walk? Yes   Fall in past 12 months? 1   Do you feel unsteady? 1   Are you worried about falling 1   Is the gait abnormal? 1   Number of falls in past 12 months 2   Fall with injury?  1     Has portable oxygen, on 4 L of oxygen

## 2021-04-26 ENCOUNTER — CLINICAL SUPPORT (OUTPATIENT)
Dept: CARDIOLOGY CLINIC | Age: 61
End: 2021-04-26

## 2021-04-26 VITALS — DIASTOLIC BLOOD PRESSURE: 80 MMHG | HEART RATE: 94 BPM | SYSTOLIC BLOOD PRESSURE: 116 MMHG

## 2021-04-26 DIAGNOSIS — Z01.30 BLOOD PRESSURE CHECK: Primary | ICD-10-CM

## 2021-04-26 NOTE — PROGRESS NOTES
Margarito Mason is a 63 y.o. male that is here for a blood pressure check. His current medications are listed below. Current Outpatient Medications   Medication Sig    polyethylene glycol (MIRALAX) 17 gram packet Take 1 Packet by mouth daily.  hydroCHLOROthiazide (HYDRODIURIL) 25 mg tablet Take 1 tablet by mouth once daily    amLODIPine (NORVASC) 5 mg tablet Take 1 Tab by mouth daily.  furosemide (Lasix) 40 mg tablet Take 1 Tab by mouth daily.  butenafine (MENTAX) 1 % topical cream Apply  to affected area daily.  metFORMIN (GLUCOPHAGE) 500 mg tablet TAKE 1 TABLET BY MOUTH TWICE DAILY WITH MEALS    carvediloL (COREG) 6.25 mg tablet TAKE 1 TABLET BY MOUTH TWICE DAILY WITH MEALS    cetirizine (Allergy Relief, cetirizine,) 10 mg tablet Take 1 tablet by mouth once daily    pravastatin (PRAVACHOL) 40 mg tablet Take 40 mg by mouth nightly.  cyclobenzaprine (FLEXERIL) 10 mg tablet Take 1 Tab by mouth three (3) times daily as needed for Muscle Spasm(s).  clobetasoL (TEMOVATE) 0.05 % ointment Apply  to affected area two (2) times a day. Use a thin film.  amitriptyline (ELAVIL) 50 mg tablet Take 1 Tab by mouth nightly.  Advair Diskus 250-50 mcg/dose diskus inhaler     Spiriva with HandiHaler 18 mcg inhalation capsule     atorvastatin (LIPITOR) 40 mg tablet Take 1 tablet by mouth once daily    albuterol (PROVENTIL HFA, VENTOLIN HFA, PROAIR HFA) 90 mcg/actuation inhaler Take 2 Puffs by inhalation every four (4) hours as needed for Wheezing or Shortness of Breath.  aspirin 81 mg chewable tablet Take 1 Tab by mouth daily.  losartan (COZAAR) 50 mg tablet Take 1 Tab by mouth daily. (Patient taking differently: Take 100 mg by mouth daily.)    gabapentin (NEURONTIN) 300 mg capsule Take 1 Cap by mouth four (4) times daily. (Patient taking differently: Take 300 mg by mouth two (2) times a day.)    cholecalciferol (VITAMIN D3) 1,000 unit tablet Take 1,000 Units by mouth daily.     glipiZIDE (GLUCOTROL) 10 mg tablet Take 10 mg by mouth three (3) times daily. No current facility-administered medications for this visit.                 His   Visit Vitals  /80 (BP 1 Location: Left upper arm, BP Patient Position: Sitting, BP Cuff Size: Large adult)   Pulse 94

## 2021-05-05 NOTE — PROGRESS NOTES
One tachy event, lasting 6 sec at a rate 194 . The pause was undersensing .  Dr Abisai Alejandre patient

## 2021-05-05 NOTE — PROGRESS NOTES
I have personally seen and evaluated the device findings. Interrogation reviewed and I agree with assessment.     Fredi Peacock

## 2021-06-11 ENCOUNTER — TELEPHONE (OUTPATIENT)
Dept: CARDIOLOGY CLINIC | Age: 61
End: 2021-06-11

## 2021-06-11 NOTE — TELEPHONE ENCOUNTER
Left message on both contacts for patient. Patient needs to check his loop recorder from home if still able to.

## 2021-08-31 ENCOUNTER — TELEPHONE (OUTPATIENT)
Dept: CARDIOLOGY CLINIC | Age: 61
End: 2021-08-31

## 2021-08-31 NOTE — TELEPHONE ENCOUNTER
Patients phone number is disconnected. Reached to spouses cell phone an left a message. Patient needs an appt with Dr. Bashir Girard due to missing his July appt as well as needing to do a loop recorder transmission since one hasn't been done since April of 2021.

## 2025-07-01 NOTE — TELEPHONE ENCOUNTER
----- Message from Gómez Jeffrey MD sent at 3/23/2021 12:04 PM EDT -----  Duplex negative for DVT. Please advise pt. R:    5:48 PM [facility] informed Intake that pt was accepted by Dr. Joseph Ballard. [phone]    5:55 PM Admit board updated.    6:04 PM Report info exchanged.

## (undated) DEVICE — (D)NDL SPNE 22GX15CM -- DISC BY MFR W/NO SUB

## (undated) DEVICE — (D)BNDG ADHESIVE FABRIC 3/4X3 -- DISC BY MFR USE ITEM 357960

## (undated) DEVICE — KENDALL 500 SERIES DIAPHORETIC FOAM MONITORING ELECTRODE - TEAR DROP SHAPE ( 30/PK): Brand: KENDALL

## (undated) DEVICE — GUIDEWIRE VASC L190CM DIA0.014IN ELASTINITE NIT HYDRPHLC

## (undated) DEVICE — NDL SPNE MANAN 22GX6IN --

## (undated) DEVICE — TRAY MYEL SFTY +

## (undated) DEVICE — COPILOT BLEEDBACK CONTROL VALVE: Brand: COPILOT

## (undated) DEVICE — PERCUTANEOUS ENTRY THINWALL NEEDLE  ONE-PART: Brand: COOK

## (undated) DEVICE — MEDI-VAC NON-CONDUCTIVE SUCTION TUBING: Brand: CARDINAL HEALTH

## (undated) DEVICE — INTRODUCER SHTH 6FR CANN L11CM W/ MINI GWIRE UNIQUE SLIX

## (undated) DEVICE — RADIFOCUS OPTITORQUE ANGIOGRAPHIC CATHETER: Brand: OPTITORQUE

## (undated) DEVICE — CONTAINER PREFIL FRMLN 15ML --

## (undated) DEVICE — SYR 10ML LUER LOK 1/5ML GRAD --

## (undated) DEVICE — MEDIA CONTRAST 10ML 200MG/ML 41%

## (undated) DEVICE — SYR 50ML SLIP TIP NSAF LF STRL --

## (undated) DEVICE — SET ANGIO L6.5IN L BOR 3 W STPCOCK SPIK TBNG

## (undated) DEVICE — CATHETER GUID L100CM DIA5FR 0.056IN S STL PTFE NYL COR JR4

## (undated) DEVICE — GUIDEWIRE VASC L185CM DIA0.014IN HYDRPHLC PTFE STR TIP

## (undated) DEVICE — GUIDEWIRE VASC L150CM DIA0.025IN TIP L7CM J RAD 3MM PTFE

## (undated) DEVICE — SURGICAL PROCEDURE KIT LT HRT CUST

## (undated) DEVICE — DEVICE INFL 60ML 12ATM CONVENIENT LOK REL HNDL HI PRSS FLX

## (undated) DEVICE — CATHETER PULM ART L110CM DIA6FR THRMDIL DBL LUMN FLO SWN GZ

## (undated) DEVICE — KIT COLON W/ 1.1OZ LUB AND 2 END

## (undated) DEVICE — PRESSURE MONITORING SET: Brand: TRUWAVE

## (undated) DEVICE — FLEX ADVANTAGE 1500CC: Brand: FLEX ADVANTAGE

## (undated) DEVICE — BASIN EMESIS 500CC ROSE 250/CS 60/PLT: Brand: MEDEGEN MEDICAL PRODUCTS, LLC

## (undated) DEVICE — BAND RADIAL COMPR ARTERY 27CM -- LNG BX/10

## (undated) DEVICE — SET EPI 18GA L3.5IN TUOHY NDL W/ 20GA CLS TIP NYL CATH

## (undated) DEVICE — Device: Brand: MEDEX

## (undated) DEVICE — SUTURE MCRYL SZ 4-0 L18IN ABSRB UD L19MM PS-2 3/8 CIR PRIM Y496G

## (undated) DEVICE — FORCEPS BX L240CM JAW DIA2.8MM L CAP W/ NDL MIC MESH TOOTH